# Patient Record
Sex: FEMALE | Race: WHITE | NOT HISPANIC OR LATINO | URBAN - METROPOLITAN AREA
[De-identification: names, ages, dates, MRNs, and addresses within clinical notes are randomized per-mention and may not be internally consistent; named-entity substitution may affect disease eponyms.]

---

## 2018-06-27 VITALS
RESPIRATION RATE: 16 BRPM | HEART RATE: 74 BPM | DIASTOLIC BLOOD PRESSURE: 68 MMHG | SYSTOLIC BLOOD PRESSURE: 102 MMHG | TEMPERATURE: 98 F | OXYGEN SATURATION: 96 % | WEIGHT: 100.09 LBS

## 2018-06-27 LAB
ALBUMIN SERPL ELPH-MCNC: 2.2 G/DL — LOW (ref 3.4–5)
ALBUMIN SERPL ELPH-MCNC: 2.8 G/DL — LOW (ref 3.4–5)
ALP SERPL-CCNC: 38 U/L — LOW (ref 40–120)
ALP SERPL-CCNC: 45 U/L — SIGNIFICANT CHANGE UP (ref 40–120)
ALT FLD-CCNC: 41 U/L — SIGNIFICANT CHANGE UP (ref 12–42)
ALT FLD-CCNC: 46 U/L — HIGH (ref 12–42)
ANION GAP SERPL CALC-SCNC: 5 MMOL/L — LOW (ref 9–16)
ANION GAP SERPL CALC-SCNC: 7 MMOL/L — LOW (ref 9–16)
APPEARANCE UR: CLEAR — SIGNIFICANT CHANGE UP
AST SERPL-CCNC: 102 U/L — HIGH (ref 15–37)
AST SERPL-CCNC: 104 U/L — HIGH (ref 15–37)
BASOPHILS NFR BLD AUTO: 0.2 % — SIGNIFICANT CHANGE UP (ref 0–2)
BILIRUB DIRECT SERPL-MCNC: 0.3 MG/DL — HIGH
BILIRUB SERPL-MCNC: 1 MG/DL — SIGNIFICANT CHANGE UP (ref 0.2–1.2)
BILIRUB SERPL-MCNC: 1.4 MG/DL — HIGH (ref 0.2–1.2)
BILIRUB UR-MCNC: NEGATIVE — SIGNIFICANT CHANGE UP
BUN SERPL-MCNC: 37 MG/DL — HIGH (ref 7–23)
BUN SERPL-MCNC: 46 MG/DL — HIGH (ref 7–23)
CALCIUM SERPL-MCNC: 7.8 MG/DL — LOW (ref 8.5–10.5)
CALCIUM SERPL-MCNC: 9.3 MG/DL — SIGNIFICANT CHANGE UP (ref 8.5–10.5)
CHLORIDE SERPL-SCNC: 101 MMOL/L — SIGNIFICANT CHANGE UP (ref 96–108)
CHLORIDE SERPL-SCNC: 95 MMOL/L — LOW (ref 96–108)
CK SERPL-CCNC: 1936 U/L — HIGH (ref 26–192)
CK SERPL-CCNC: 2427 U/L — HIGH (ref 26–192)
CO2 SERPL-SCNC: 33 MMOL/L — HIGH (ref 22–31)
CO2 SERPL-SCNC: 39 MMOL/L — HIGH (ref 22–31)
COLOR SPEC: YELLOW — SIGNIFICANT CHANGE UP
CREAT SERPL-MCNC: 1.26 MG/DL — SIGNIFICANT CHANGE UP (ref 0.5–1.3)
CREAT SERPL-MCNC: 1.27 MG/DL — SIGNIFICANT CHANGE UP (ref 0.5–1.3)
DIFF PNL FLD: ABNORMAL
EOSINOPHIL NFR BLD AUTO: 0 % — SIGNIFICANT CHANGE UP (ref 0–6)
ETHANOL SERPL-MCNC: <3 MG/DL — SIGNIFICANT CHANGE UP
GLUCOSE SERPL-MCNC: 142 MG/DL — HIGH (ref 70–99)
GLUCOSE SERPL-MCNC: 156 MG/DL — HIGH (ref 70–99)
GLUCOSE UR QL: NEGATIVE — SIGNIFICANT CHANGE UP
HCT VFR BLD CALC: 39.1 % — SIGNIFICANT CHANGE UP (ref 34.5–45)
HGB BLD-MCNC: 13.7 G/DL — SIGNIFICANT CHANGE UP (ref 11.5–15.5)
IMM GRANULOCYTES NFR BLD AUTO: 0.6 % — SIGNIFICANT CHANGE UP (ref 0–1.5)
KETONES UR-MCNC: NEGATIVE — SIGNIFICANT CHANGE UP
LEUKOCYTE ESTERASE UR-ACNC: NEGATIVE — SIGNIFICANT CHANGE UP
LYMPHOCYTES # BLD AUTO: 10.7 % — LOW (ref 13–44)
MAGNESIUM SERPL-MCNC: 2.4 MG/DL — SIGNIFICANT CHANGE UP (ref 1.6–2.6)
MCHC RBC-ENTMCNC: 31.9 PG — SIGNIFICANT CHANGE UP (ref 27–34)
MCHC RBC-ENTMCNC: 35 G/DL — SIGNIFICANT CHANGE UP (ref 32–36)
MCV RBC AUTO: 91.1 FL — SIGNIFICANT CHANGE UP (ref 80–100)
MONOCYTES NFR BLD AUTO: 7.7 % — SIGNIFICANT CHANGE UP (ref 2–14)
NEUTROPHILS NFR BLD AUTO: 80.8 % — HIGH (ref 43–77)
NITRITE UR-MCNC: NEGATIVE — SIGNIFICANT CHANGE UP
PCP SPEC-MCNC: SIGNIFICANT CHANGE UP
PH UR: 5.5 — SIGNIFICANT CHANGE UP (ref 5–8)
PLATELET # BLD AUTO: 192 K/UL — SIGNIFICANT CHANGE UP (ref 150–400)
POTASSIUM SERPL-MCNC: 2.9 MMOL/L — CRITICAL LOW (ref 3.5–5.3)
POTASSIUM SERPL-MCNC: 2.9 MMOL/L — CRITICAL LOW (ref 3.5–5.3)
POTASSIUM SERPL-SCNC: 2.9 MMOL/L — CRITICAL LOW (ref 3.5–5.3)
POTASSIUM SERPL-SCNC: 2.9 MMOL/L — CRITICAL LOW (ref 3.5–5.3)
PROT SERPL-MCNC: 6 G/DL — LOW (ref 6.4–8.2)
PROT SERPL-MCNC: 7.4 G/DL — SIGNIFICANT CHANGE UP (ref 6.4–8.2)
PROT UR-MCNC: NEGATIVE MG/DL — SIGNIFICANT CHANGE UP
RBC # BLD: 4.29 M/UL — SIGNIFICANT CHANGE UP (ref 3.8–5.2)
RBC # FLD: 13 % — SIGNIFICANT CHANGE UP (ref 10.3–16.9)
SODIUM SERPL-SCNC: 139 MMOL/L — SIGNIFICANT CHANGE UP (ref 132–145)
SODIUM SERPL-SCNC: 141 MMOL/L — SIGNIFICANT CHANGE UP (ref 132–145)
SP GR SPEC: 1.01 — SIGNIFICANT CHANGE UP (ref 1–1.03)
TROPONIN I SERPL-MCNC: 0.09 NG/ML — HIGH (ref 0.02–0.06)
TROPONIN I SERPL-MCNC: 0.09 NG/ML — HIGH (ref 0.02–0.06)
UROBILINOGEN FLD QL: 0.2 E.U./DL — SIGNIFICANT CHANGE UP
WBC # BLD: 12.6 K/UL — HIGH (ref 3.8–10.5)
WBC # FLD AUTO: 12.6 K/UL — HIGH (ref 3.8–10.5)

## 2018-06-27 PROCEDURE — 70450 CT HEAD/BRAIN W/O DYE: CPT | Mod: 26

## 2018-06-27 PROCEDURE — 72125 CT NECK SPINE W/O DYE: CPT | Mod: 26

## 2018-06-27 PROCEDURE — 71045 X-RAY EXAM CHEST 1 VIEW: CPT | Mod: 26

## 2018-06-27 PROCEDURE — 99285 EMERGENCY DEPT VISIT HI MDM: CPT | Mod: 25

## 2018-06-27 PROCEDURE — 76700 US EXAM ABDOM COMPLETE: CPT | Mod: 26

## 2018-06-27 PROCEDURE — 93010 ELECTROCARDIOGRAM REPORT: CPT

## 2018-06-27 RX ORDER — SODIUM CHLORIDE 9 MG/ML
1000 INJECTION INTRAMUSCULAR; INTRAVENOUS; SUBCUTANEOUS
Qty: 0 | Refills: 0 | Status: DISCONTINUED | OUTPATIENT
Start: 2018-06-27 | End: 2018-06-27

## 2018-06-27 RX ORDER — POTASSIUM CHLORIDE 20 MEQ
40 PACKET (EA) ORAL ONCE
Qty: 0 | Refills: 0 | Status: COMPLETED | OUTPATIENT
Start: 2018-06-27 | End: 2018-06-27

## 2018-06-27 RX ORDER — POTASSIUM CHLORIDE 20 MEQ
10 PACKET (EA) ORAL ONCE
Qty: 0 | Refills: 0 | Status: COMPLETED | OUTPATIENT
Start: 2018-06-27 | End: 2018-06-27

## 2018-06-27 RX ORDER — SODIUM CHLORIDE 9 MG/ML
1000 INJECTION INTRAMUSCULAR; INTRAVENOUS; SUBCUTANEOUS
Qty: 0 | Refills: 0 | Status: DISCONTINUED | OUTPATIENT
Start: 2018-06-27 | End: 2018-06-28

## 2018-06-27 RX ADMIN — Medication 100 MILLIEQUIVALENT(S): at 13:29

## 2018-06-27 RX ADMIN — SODIUM CHLORIDE 1000 MILLILITER(S): 9 INJECTION INTRAMUSCULAR; INTRAVENOUS; SUBCUTANEOUS at 19:50

## 2018-06-27 RX ADMIN — SODIUM CHLORIDE 150 MILLILITER(S): 9 INJECTION INTRAMUSCULAR; INTRAVENOUS; SUBCUTANEOUS at 13:09

## 2018-06-27 RX ADMIN — Medication 40 MILLIEQUIVALENT(S): at 13:09

## 2018-06-27 RX ADMIN — SODIUM CHLORIDE 100 MILLILITER(S): 9 INJECTION INTRAMUSCULAR; INTRAVENOUS; SUBCUTANEOUS at 20:42

## 2018-06-27 RX ADMIN — SODIUM CHLORIDE 150 MILLILITER(S): 9 INJECTION INTRAMUSCULAR; INTRAVENOUS; SUBCUTANEOUS at 13:29

## 2018-06-27 RX ADMIN — Medication 100 MILLIEQUIVALENT(S): at 20:41

## 2018-06-27 RX ADMIN — Medication 100 MILLIEQUIVALENT(S): at 22:21

## 2018-06-27 RX ADMIN — Medication 40 MILLIEQUIVALENT(S): at 20:41

## 2018-06-27 NOTE — ED PROVIDER NOTE - ATTENDING CONTRIBUTION TO CARE
Patient presenting with fall/found down/frequent fall. More clouded than usual per HHA. VSS. Scabs to forehead. No focal c/o. Will send gomez for falls with urine and CPK. Anticipate admit. Per HHA she is off baseline and dn seem safe for d/c.

## 2018-06-27 NOTE — ED PROVIDER NOTE - PMH
Anxiety    ASHD (arteriosclerotic heart disease)    Hypertension    Syncope and collapse    UTI (urinary tract infection)

## 2018-06-27 NOTE — ED ADULT NURSE NOTE - CHIEF COMPLAINT QUOTE
here for AMS, as per HHA pt was found on the floor and is " not herself" weakness noted to left side of body- last known normal unknown here for AMS, as per HHA (Tatum)  pt was found on the floor and is " not herself" weakness noted to left side of body- last known imelda unknown

## 2018-06-27 NOTE — ED ADULT TRIAGE NOTE - CHIEF COMPLAINT QUOTE
here for AMS, as per HHA pt was found on the floor and is " not herself" weakness noted to left side of body- last known normal unknown

## 2018-06-27 NOTE — ED PROVIDER NOTE - OBJECTIVE STATEMENT
89 y/o Female with a PMHx of ASHD, and HTN presents to the ED with hha after bein found on the couch, as per hha she usually answers the door to when the haa arrives. but has been able to talk to to the rafa since monday. 89 y/o Female with a PMHx of ASHD, and HTN presents to the ED with her HHA after being found on floor. As per HHA, she usually answers the door when the HHA arrives but she not has been able to walk to the door since Monday (2 days). The HHA was able to let her self in with a key and found the pt on the floor next to the couch. She was alert and oriented to person and place. Pt was conversant and denied any complaints. She initially was amnestic to falling but later states she recalls falling off the couch. Denies headache, fever, chills, visual changes, weakness, numbness, tingling, CP, SOB, palpitations, abdominal pain, N/V/D/C, pain to extremities, urinary incontinence, sick contacts, and recent travel. Follows with Dr. Akbar Hicks, Cardiology [phone]

## 2018-06-27 NOTE — ED PROVIDER NOTE - PROGRESS NOTE DETAILS
Pt discussed with Dr. Gandhi and YISEL via [facility] transfer center. Pt discussed with Dr. Gandhi and YISEL via [facility] transfer center.  He requests repeat cmp, trop and mag Repeat labs resulted.  Potassium unchanged.  Dr. Gandhi called via [facility] transfer center.  Pt cannot go to regional medicine location with potassium below 3.  Will repeat cmp and trop once 2nd and 3rd potassium infusions completed. K 3.4. discussed with transfer center. patient accepted under Reji CHANG

## 2018-06-27 NOTE — ED PROVIDER NOTE - RAPID ASSESSMENT
89 yo female BIBEMS + HHA for fall.  Found on the floor.  AAO x 2.  Progressive decline as per HHA.  And has been more sleepy.  No VS dernagements on arrival. Orders placed: labs, imaging, urine.

## 2018-06-28 ENCOUNTER — INPATIENT (INPATIENT)
Facility: HOSPITAL | Age: 83
LOS: 2 days | Discharge: NURSING, REHAB OR LONG-TERM CARE | DRG: 557 | End: 2018-07-01
Attending: INTERNAL MEDICINE | Admitting: INTERNAL MEDICINE
Payer: MEDICARE

## 2018-06-28 DIAGNOSIS — R63.8 OTHER SYMPTOMS AND SIGNS CONCERNING FOOD AND FLUID INTAKE: ICD-10-CM

## 2018-06-28 DIAGNOSIS — E46 UNSPECIFIED PROTEIN-CALORIE MALNUTRITION: ICD-10-CM
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
## 2018-06-28 DIAGNOSIS — R94.31 ABNORMAL ELECTROCARDIOGRAM [ECG] [EKG]: ICD-10-CM

## 2018-06-28 DIAGNOSIS — R26.81 UNSTEADINESS ON FEET: ICD-10-CM

## 2018-06-28 DIAGNOSIS — I10 ESSENTIAL (PRIMARY) HYPERTENSION: ICD-10-CM

## 2018-06-28 DIAGNOSIS — Z29.9 ENCOUNTER FOR PROPHYLACTIC MEASURES, UNSPECIFIED: ICD-10-CM

## 2018-06-28 DIAGNOSIS — R74.8 ABNORMAL LEVELS OF OTHER SERUM ENZYMES: ICD-10-CM

## 2018-06-28 DIAGNOSIS — R74.0 NONSPECIFIC ELEVATION OF LEVELS OF TRANSAMINASE AND LACTIC ACID DEHYDROGENASE [LDH]: ICD-10-CM

## 2018-06-28 DIAGNOSIS — Z96.643 PRESENCE OF ARTIFICIAL HIP JOINT, BILATERAL: Chronic | ICD-10-CM

## 2018-06-28 DIAGNOSIS — M62.82 RHABDOMYOLYSIS: ICD-10-CM

## 2018-06-28 DIAGNOSIS — E87.6 HYPOKALEMIA: ICD-10-CM

## 2018-06-28 DIAGNOSIS — E87.3 ALKALOSIS: ICD-10-CM

## 2018-06-28 LAB
ALBUMIN SERPL ELPH-MCNC: 2.3 G/DL — LOW (ref 3.4–5)
ALBUMIN SERPL ELPH-MCNC: 2.8 G/DL — LOW (ref 3.3–5)
ALP SERPL-CCNC: 32 U/L — LOW (ref 40–120)
ALP SERPL-CCNC: 40 U/L — SIGNIFICANT CHANGE UP (ref 40–120)
ALT FLD-CCNC: 33 U/L — SIGNIFICANT CHANGE UP (ref 10–45)
ALT FLD-CCNC: 45 U/L — HIGH (ref 12–42)
ANION GAP SERPL CALC-SCNC: 5 MMOL/L — LOW (ref 9–16)
ANION GAP SERPL CALC-SCNC: 7 MMOL/L — SIGNIFICANT CHANGE UP (ref 5–17)
AST SERPL-CCNC: 110 U/L — HIGH (ref 15–37)
AST SERPL-CCNC: 66 U/L — HIGH (ref 10–40)
BILIRUB SERPL-MCNC: 1 MG/DL — SIGNIFICANT CHANGE UP (ref 0.2–1.2)
BILIRUB SERPL-MCNC: 1.2 MG/DL — SIGNIFICANT CHANGE UP (ref 0.2–1.2)
BUN SERPL-MCNC: 27 MG/DL — HIGH (ref 7–23)
BUN SERPL-MCNC: 33 MG/DL — HIGH (ref 7–23)
CALCIUM SERPL-MCNC: 8.4 MG/DL — SIGNIFICANT CHANGE UP (ref 8.4–10.5)
CALCIUM SERPL-MCNC: 8.6 MG/DL — SIGNIFICANT CHANGE UP (ref 8.5–10.5)
CHLORIDE SERPL-SCNC: 100 MMOL/L — SIGNIFICANT CHANGE UP (ref 96–108)
CHLORIDE SERPL-SCNC: 105 MMOL/L — SIGNIFICANT CHANGE UP (ref 96–108)
CO2 SERPL-SCNC: 31 MMOL/L — SIGNIFICANT CHANGE UP (ref 22–31)
CO2 SERPL-SCNC: 33 MMOL/L — HIGH (ref 22–31)
CREAT SERPL-MCNC: 0.95 MG/DL — SIGNIFICANT CHANGE UP (ref 0.5–1.3)
CREAT SERPL-MCNC: 1.11 MG/DL — SIGNIFICANT CHANGE UP (ref 0.5–1.3)
CULTURE RESULTS: SIGNIFICANT CHANGE UP
GLUCOSE SERPL-MCNC: 101 MG/DL — HIGH (ref 70–99)
GLUCOSE SERPL-MCNC: 98 MG/DL — SIGNIFICANT CHANGE UP (ref 70–99)
HCT VFR BLD CALC: 36.3 % — SIGNIFICANT CHANGE UP (ref 34.5–45)
HGB BLD-MCNC: 11.9 G/DL — SIGNIFICANT CHANGE UP (ref 11.5–15.5)
MCHC RBC-ENTMCNC: 30.5 PG — SIGNIFICANT CHANGE UP (ref 27–34)
MCHC RBC-ENTMCNC: 32.8 G/DL — SIGNIFICANT CHANGE UP (ref 32–36)
MCV RBC AUTO: 93.1 FL — SIGNIFICANT CHANGE UP (ref 80–100)
PLATELET # BLD AUTO: 176 K/UL — SIGNIFICANT CHANGE UP (ref 150–400)
POTASSIUM SERPL-MCNC: 3.4 MMOL/L — LOW (ref 3.5–5.3)
POTASSIUM SERPL-MCNC: 4.4 MMOL/L — SIGNIFICANT CHANGE UP (ref 3.5–5.3)
POTASSIUM SERPL-SCNC: 3.4 MMOL/L — LOW (ref 3.5–5.3)
POTASSIUM SERPL-SCNC: 4.4 MMOL/L — SIGNIFICANT CHANGE UP (ref 3.5–5.3)
PROT SERPL-MCNC: 5.8 G/DL — LOW (ref 6–8.3)
PROT SERPL-MCNC: 6.3 G/DL — LOW (ref 6.4–8.2)
RBC # BLD: 3.9 M/UL — SIGNIFICANT CHANGE UP (ref 3.8–5.2)
RBC # FLD: 13.9 % — SIGNIFICANT CHANGE UP (ref 10.3–16.9)
SODIUM SERPL-SCNC: 138 MMOL/L — SIGNIFICANT CHANGE UP (ref 132–145)
SODIUM SERPL-SCNC: 143 MMOL/L — SIGNIFICANT CHANGE UP (ref 135–145)
SPECIMEN SOURCE: SIGNIFICANT CHANGE UP
TROPONIN I SERPL-MCNC: 0.09 NG/ML — HIGH (ref 0.02–0.06)
TROPONIN T SERPL-MCNC: 0.02 NG/ML — HIGH (ref 0–0.01)
WBC # BLD: 10 K/UL — SIGNIFICANT CHANGE UP (ref 3.8–10.5)
WBC # FLD AUTO: 10 K/UL — SIGNIFICANT CHANGE UP (ref 3.8–10.5)

## 2018-06-28 PROCEDURE — 99223 1ST HOSP IP/OBS HIGH 75: CPT | Mod: GC

## 2018-06-28 PROCEDURE — 12345: CPT | Mod: NC,GC

## 2018-06-28 RX ORDER — POTASSIUM CHLORIDE 20 MEQ
40 PACKET (EA) ORAL EVERY 4 HOURS
Qty: 0 | Refills: 0 | Status: DISCONTINUED | OUTPATIENT
Start: 2018-06-28 | End: 2018-06-28

## 2018-06-28 RX ORDER — FUROSEMIDE 40 MG
20 TABLET ORAL DAILY
Qty: 0 | Refills: 0 | Status: DISCONTINUED | OUTPATIENT
Start: 2018-06-28 | End: 2018-06-28

## 2018-06-28 RX ORDER — KETOCONAZOLE 20 MG/G
1 AEROSOL, FOAM TOPICAL
Qty: 0 | Refills: 0 | Status: DISCONTINUED | OUTPATIENT
Start: 2018-06-28 | End: 2018-07-01

## 2018-06-28 RX ORDER — HEPARIN SODIUM 5000 [USP'U]/ML
5000 INJECTION INTRAVENOUS; SUBCUTANEOUS EVERY 12 HOURS
Qty: 0 | Refills: 0 | Status: DISCONTINUED | OUTPATIENT
Start: 2018-06-28 | End: 2018-07-01

## 2018-06-28 RX ORDER — RALOXIFENE HYDROCHLORIDE 60 MG/1
60 TABLET, COATED ORAL DAILY
Qty: 0 | Refills: 0 | Status: DISCONTINUED | OUTPATIENT
Start: 2018-06-28 | End: 2018-07-01

## 2018-06-28 RX ADMIN — Medication 40 MILLIEQUIVALENT(S): at 10:24

## 2018-06-28 RX ADMIN — HEPARIN SODIUM 5000 UNIT(S): 5000 INJECTION INTRAVENOUS; SUBCUTANEOUS at 17:34

## 2018-06-28 RX ADMIN — Medication 40 MILLIEQUIVALENT(S): at 06:51

## 2018-06-28 RX ADMIN — KETOCONAZOLE 1 APPLICATION(S): 20 AEROSOL, FOAM TOPICAL at 23:56

## 2018-06-28 RX ADMIN — HEPARIN SODIUM 5000 UNIT(S): 5000 INJECTION INTRAVENOUS; SUBCUTANEOUS at 06:51

## 2018-06-28 RX ADMIN — KETOCONAZOLE 1 APPLICATION(S): 20 AEROSOL, FOAM TOPICAL at 10:24

## 2018-06-28 RX ADMIN — RALOXIFENE HYDROCHLORIDE 60 MILLIGRAM(S): 60 TABLET, COATED ORAL at 18:37

## 2018-06-28 RX ADMIN — SODIUM CHLORIDE 100 MILLILITER(S): 9 INJECTION INTRAMUSCULAR; INTRAVENOUS; SUBCUTANEOUS at 03:28

## 2018-06-28 RX ADMIN — Medication 20 MILLIGRAM(S): at 06:51

## 2018-06-28 NOTE — ED ADULT NURSE REASSESSMENT NOTE - NS ED NURSE REASSESS COMMENT FT1
patient turned on right side for comfort; unable to find mepliex dressing for pressure ulcer to apply fo rprotection; will turn from side to side to relieve pressure as per patient needs and comfort

## 2018-06-28 NOTE — DIETITIAN INITIAL EVALUATION ADULT. - PROBLEM SELECTOR PLAN 8
She appears dry and has difficulty making food for herself.  Her albumin is 2.2.  Replete electrolytes and feed regular diet.

## 2018-06-28 NOTE — H&P ADULT - PROBLEM SELECTOR PLAN 7
Uptrending troponin s(CK peaked) with EKG showing NSR with prolonged QTc 508, no active chest pain or history of chest pain today.  Trend to peak and repeat EKG in am. Uptrending troponin s(CK peaked) with EKG showing NSR with prolonged QTc 508, no active chest pain or history of chest pain today.  Trend to peak and repeat EKG in am.    #Tinea pedis  -Ketoconazole BID to toes    #Venous stasis   -Consider vascular consult vs. wound care as it needs to be better dressed and cleaned. DOes not appear infected. She appears dry and has difficulty making food for herself.  Her albumin is 2.2.  Replete electrolytes and feed regular diet. Hx of HTN but not hypertensive here.  Check orthostatics  Hold antihypertensives; clarify home medications Hx of HTN but not hypertensive here.  Check orthostatics  Hold antihypertensives; clarify home medications; clarify why pt is on lasix     HPL: clarify statin dose, hold for now in setting of rhabdo

## 2018-06-28 NOTE — PHYSICAL THERAPY INITIAL EVALUATION ADULT - PLANNED THERAPY INTERVENTIONS, PT EVAL
balance training/postural re-education/strengthening/transfer training/gait training/bed mobility training

## 2018-06-28 NOTE — PHYSICAL THERAPY INITIAL EVALUATION ADULT - GAIT DEVIATIONS NOTED, PT EVAL
decreased step length/decreased weight-shifting ability/decreased nerissa/increased time in double stance

## 2018-06-28 NOTE — H&P ADULT - NSHPLABSRESULTS_GEN_ALL_CORE
138  |  100  |  33<H>  ----------------------------<  98  3.4<L>   |  33<H>  |  1.11    Ca    8.6      2018 00:12  Mg     2.4         TPro  6.3<L>  /  Alb  2.3<L>  /  TBili  1.2  /  DBili  x   /  AST  110<H>  /  ALT  45<H>  /  AlkPhos  40                              13.7   12.6  )-----------( 192      ( 2018 11:30 )             39.1       CARDIAC MARKERS ( 2018 00:12 )  0.091 ng/mL / x     / x     / x     / x      CARDIAC MARKERS ( 2018 19:30 )  0.090 ng/mL / x     / 1936 U/L / x     / x      CARDIAC MARKERS ( 2018 13:06 )  0.086 ng/mL / x     / 2427 U/L / x     / x            LIVER FUNCTIONS - ( 2018 00:12 )  Alb: 2.3 g/dL / Pro: 6.3 g/dL / ALK PHOS: 40 U/L / ALT: 45 U/L / AST: 110 U/L / GGT: x                 Urinalysis Basic - ( 2018 12:48 )    Color: Yellow / Appearance: Clear / S.015 / pH: x  Gluc: x / Ketone: NEGATIVE  / Bili: NEGATIVE / Urobili: 0.2 E.U./dL   Blood: x / Protein: NEGATIVE mg/dL / Nitrite: NEGATIVE   Leuk Esterase: NEGATIVE / RBC: < 5 /HPF / WBC x   Sq Epi: x / Non Sq Epi: Few /HPF / Bacteria: x      EKG: NSR w/ LAD, prolonged QTc 511    US Abd: IMPRESSION:  Limited study.    Right renal cyst. Small/atrophic kidneys bilaterally.    Sludge in the gallbladder.      CT head   IMPRESSION: No intracranial hemorrhage or acute transcortical infarct.   Generalized volume loss with small vessel ischemic disease.     CT cervical spine: Impression:   Limited evaluation.     Minimal anterolisthesis of C3 on C4, C4 on C5 and C7 on T1 . Mild   retrolisthesis of C5 on C6 .     Marked degenerative changes of the cervical spine with no evidence of   acute fractures.

## 2018-06-28 NOTE — DIETITIAN INITIAL EVALUATION ADULT. - ENERGY NEEDS
Admission weight used as pt is within % ideal body weight  Needs calculated for maintenance in older adults

## 2018-06-28 NOTE — H&P ADULT - NSHPPHYSICALEXAM_GEN_ALL_CORE
ICU Vital Signs Last 24 Hrs  T(C): 37 (28 Jun 2018 03:22), Max: 37 (27 Jun 2018 13:22)  T(F): 98.6 (28 Jun 2018 03:22), Max: 98.6 (27 Jun 2018 13:22)  HR: 78 (28 Jun 2018 03:22) (67 - 78)  BP: 103/58 (28 Jun 2018 03:22) (102/68 - 138/74)  BP(mean): --  ABP: --  ABP(mean): --  RR: 18 (28 Jun 2018 03:22) (16 - 18)  SpO2: 94% (28 Jun 2018 03:22) (94% - 99%)    General:  HEENT:  Cardio:  Lung:  Abd:  Ext: ICU Vital Signs Last 24 Hrs  T(C): 37 (28 Jun 2018 03:22), Max: 37 (27 Jun 2018 13:22)  T(F): 98.6 (28 Jun 2018 03:22), Max: 98.6 (27 Jun 2018 13:22)  HR: 78 (28 Jun 2018 03:22) (67 - 78)  BP: 103/58 (28 Jun 2018 03:22) (102/68 - 138/74)  BP(mean): --  ABP: --  ABP(mean): --  RR: 18 (28 Jun 2018 03:22) (16 - 18)  SpO2: 94% (28 Jun 2018 03:22) (94% - 99%)    General: NAD, very small, frail woman with many bruises in various stages of healing  HEENT: Dry mm, no jvd, no lymphadenopathy  Cardio: RRR no 2/6 systolic murmur heard  Lung: clear lungs  Abd: Soft ntnd normal bowel sounds  Ext: WWP.  B/L venous stasis changes with compression stockings overlying them. Poor hygene on the legs and fungal infection of the toes

## 2018-06-28 NOTE — DIETITIAN INITIAL EVALUATION ADULT. - PROBLEM SELECTOR PLAN 7
Hx of HTN but not hypertensive here.  Check orthostatics  Hold antihypertensives; clarify home medications; clarify why pt is on lasix     HPL: clarify statin dose, hold for now in setting of rhabdo

## 2018-06-28 NOTE — DIETITIAN INITIAL EVALUATION ADULT. - PROBLEM SELECTOR PLAN 2
RUQ us showed no acute cholestatic picture, however was not done with doppler.  Would trend CMP and send hepatitis panel to start.

## 2018-06-28 NOTE — PROGRESS NOTE ADULT - PROBLEM SELECTOR PLAN 4
see above    #diastolic HF  continue home meds lasix 40 and metolazone 2.5 see above    #diastolic HF  continue home meds lasix 40. Hold metaolazone 2.5 for now as pt appears dry on exam.

## 2018-06-28 NOTE — PROGRESS NOTE ADULT - PROBLEM SELECTOR PLAN 9
Uptrending troponin (CK peaked) with EKG showing NSR with prolonged QTc 508, no active chest pain or history of chest pain today.  Trend to peak and repeat EKG     #Tinea pedis  -Ketoconazole BID to toes    #Venous stasis   -Consider vascular consult vs. wound care as it needs to be better dressed and cleaned. DOes not appear infected.

## 2018-06-28 NOTE — PHYSICAL THERAPY INITIAL EVALUATION ADULT - CRITERIA FOR SKILLED THERAPEUTIC INTERVENTIONS
rehab potential/anticipated discharge recommendation/impairments found/functional limitations in following categories/risk reduction/prevention/therapy frequency

## 2018-06-28 NOTE — H&P ADULT - HISTORY OF PRESENT ILLNESS
89F pmhx of HTN admitted after being found down at her apartment after apparent fall.  She says that she was sitting on her couch and went to reach for a magazine on the floor and tipped over.  She thinks she was on the floor for less than 20 minutes but isn't sure.  She denies hitting her head (has scab on her head, she says they have been there for a while).  She has multiple bruises over her body and she says its because her apartment is small and she runs into things a lot.  She denies any LOC, chest pain, palpitations, dizziness or predromal symptoms.  She has no other complaints of cough, congestion, dysuria, frequency, abdominal pain, chest pain, nausea or vomiting.   She lives alone in elevator building and has HHA 3 days per week MWF for 3 hours at a time. She also has a VNS nurse who comes to care for her chronic venous stasis on her b/l LE.     IN ED:  T 97.6 HR 69 /57 RR 16 O2 97%

## 2018-06-28 NOTE — H&P ADULT - PROBLEM SELECTOR PLAN 6
She appears dry and has difficulty making food for herself.  Her albumin is 2.2.  Replete electrolytes and feed regular diet. Hx of HTN but not hypertensive here.  Check orthostatics  Hold antihypertensives Suspect very dehydrated in accordance with protein calorie malnutrition. Hydrated in [facility]

## 2018-06-28 NOTE — H&P ADULT - PROBLEM SELECTOR PLAN 3
RUQ us showed no acute cholestatic picture, however was not done with doppler.  Would trend CMP and send hepatitis panel to start. PT eval for mechanical fall. No signs of infection or syncope.

## 2018-06-28 NOTE — H&P ADULT - PROBLEM SELECTOR PLAN 10
HSQ avoid QTc prolonging medications; repeat EKG , monitor lytes    Regular diet, keep mg > 2 K >4    PPX: HSQ

## 2018-06-28 NOTE — DIETITIAN INITIAL EVALUATION ADULT. - NUTRITION INTERVENTION
Meals and Snack/Vitamin/Nutrition - Related Medication Management/Medical Food Supplements/Feeding Assistance

## 2018-06-28 NOTE — CONSULT NOTE ADULT - ASSESSMENT
89 pmhx of HTN, HLD, CKD, diastolic HF admitted after an unwitnessed fall at home.    Problem/Plan - 1:  ·  Problem: Non-traumatic rhabdomyolysis.  Plan: in setting of fall, monitor CK.     Problem/Plan - 2:  ·  Problem: Transaminitis.  Plan: RUQ us showed no acute cholestatic picture, however was not done with doppler.  Trend CMP  LFTs downtrending today.

## 2018-06-28 NOTE — PROGRESS NOTE ADULT - PROBLEM SELECTOR PLAN 2
RUQ us showed no acute cholestatic picture, however was not done with doppler.  Trend CMP  LFTs downtrending today

## 2018-06-28 NOTE — H&P ADULT - PROBLEM SELECTOR PLAN 9
HSQ Regular diet, keep mg > 2 K >4 Uptrending troponin (CK peaked) with EKG showing NSR with prolonged QTc 508, no active chest pain or history of chest pain today.  Trend to peak and repeat EKG in am.    #Tinea pedis  -Ketoconazole BID to toes    #Venous stasis   -Consider vascular consult vs. wound care as it needs to be better dressed and cleaned. DOes not appear infected.

## 2018-06-28 NOTE — DIETITIAN INITIAL EVALUATION ADULT. - OTHER INFO
Pt admitted s/p fall.  Pt lives alone and HHA x3/week.  Pt prepares her own food.  Pt endorses UBW ~102 lbs; currently 44.1kg - pt is aware it is desirable for her to maintain her weight.  Pt remains NPO at this time and endorses feeling hungry.  Spoke with MD and diet is to be advanced to Regular.  Spoke with  host who will visit pt to obtain food preferences.  Pt denies GI distress or pain.  NKFA.  Skin: stage II coccyx pressure ulcer, b/l LE chronic venous stasis ulcers.

## 2018-06-28 NOTE — DIETITIAN INITIAL EVALUATION ADULT. - PROBLEM SELECTOR PLAN 6
Suspect very dehydrated in accordance with protein calorie malnutrition. Hydrated in [facility]

## 2018-06-28 NOTE — CONSULT NOTE ADULT - SUBJECTIVE AND OBJECTIVE BOX
Patient is a 89y old  Female who presents with a chief complaint of Fall (2018 04:24)       HPI:  89F pmhx of HTN admitted after being found down at her apartment after apparent fall.  She says that she was sitting on her couch and went to reach for a magazine on the floor and tipped over.  She thinks she was on the floor for less than 20 minutes but isn't sure.  She denies hitting her head (has scab on her head, she says they have been there for a while).  She has multiple bruises over her body and she says its because her apartment is small and she runs into things a lot.  She denies any LOC, chest pain, palpitations, dizziness or predromal symptoms.  She has no other complaints of cough, congestion, dysuria, frequency, abdominal pain, chest pain, nausea or vomiting.   She lives alone in elevator building and has [facility]HA 3 days per week MWF for 3 hours at a time. She also has a VNS nurse who comes to care for her chronic venous stasis on her b/l LE.     IN ED:  T 97.6 HR 69 /57 RR 16 O2 97% (2018 04:24)      PAST MEDICAL & SURGICAL HISTORY:  UTI (urinary tract infection)  Syncope and collapse  Hypertension  ASHD (arteriosclerotic heart disease)  Anxiety  H/O bilateral hip replacements      MEDICATIONS  (STANDING):  heparin  Injectable 5000 Unit(s) SubCutaneous every 12 hours  ketoconazole 2% Cream 1 Application(s) Topical two times a day  raloxifene 60 milliGRAM(s) Oral daily    MEDICATIONS  (PRN):      Social History: lives alone in an elevator accessible apartment building has home attendant Mon/Wed/ Fri x 3-4 hrs    Functional Level Prior to Admission: states she is ADL independent, walks with a walker    FAMILY HISTORY:  No pertinent family history in first degree relatives      CBC Full  -  ( 2018 11:00 )  WBC Count : 10.0 K/uL  Hemoglobin : 11.9 g/dL  Hematocrit : 36.3 %  Platelet Count - Automated : 176 K/uL  Mean Cell Volume : 93.1 fL  Mean Cell Hemoglobin : 30.5 pg  Mean Cell Hemoglobin Concentration : 32.8 g/dL  Auto Neutrophil # : x  Auto Lymphocyte # : x  Auto Monocyte # : x  Auto Eosinophil # : x  Auto Basophil # : x  Auto Neutrophil % : x  Auto Lymphocyte % : x  Auto Monocyte % : x  Auto Eosinophil % : x  Auto Basophil % : x          143  |  105  |  27<H>  ----------------------------<  101<H>  4.4   |  31  |  0.95    Ca    8.4      2018 11:00  Mg     2.4         TPro  5.8<L>  /  Alb  2.8<L>  /  TBili  1.0  /  DBili  x   /  AST  66<H>  /  ALT  33  /  AlkPhos  32<L>        Urinalysis Basic - ( 2018 12:48 )    Color: Yellow / Appearance: Clear / S.015 / pH: x  Gluc: x / Ketone: NEGATIVE  / Bili: NEGATIVE / Urobili: 0.2 E.U./dL   Blood: x / Protein: NEGATIVE mg/dL / Nitrite: NEGATIVE   Leuk Esterase: NEGATIVE / RBC: < 5 /HPF / WBC x   Sq Epi: x / Non Sq Epi: Few /HPF / Bacteria: x          Radiology:    < from: Xray Chest 1 View AP/PA (18 @ 12:19) >  EXAM:  XR CHEST AP OR PA 1V                           PROCEDURE DATE:  2018          INTERPRETATION:  CLINICAL INFORMATION:  ams.    TECHNIQUE: A frontal view of the chest is dated 2018 12:19 PM.    COMPARISON: None available    FINDINGS: The lungs are clear.  There is no focal consolidation,   pneumothorax, or pleural effusion.  Although evaluation is limited on AP   view, the cardiac silhouette appears normal in size.      IMPRESSION: No focal infiltrates.        < from: CT Head No Cont (18 @ 12:56) >  EXAM:  CT BRAIN                           PROCEDURE DATE:  2018          INTERPRETATION:  PROCEDURE: CT brain without contrast.    INDICATION: Altered mental status    TECHNIQUE: Multiple axial sections were obtained at 5 mm intervals. The   images were reviewed in brain and bone windows.    COMPARISON: None    FINDINGS: The CT examination demonstrates generalized volume loss as   manifested by the enlargement of the ventricles, cisternal spaces, and   cortical sulci throughout. There is no midline shift or extra axial   collections. The gray white differentiation appears within normal limits.   There is no intracranial hemorrhage or acute transcortical infarct. There   is extensive patchy areas of hypodensity within the periventricular white   matter which may represent the sequela of small vessel ischemic disease.   The bony windows demonstrates no fractures. There is right scleral   calcification. The visualized paranasal sinuses are within normal limits.   The mastoid air cells are well aerated.    IMPRESSION: No intracranial hemorrhage or acute transcortical infarct.   Generalized volume loss with small vessel ischemic disease.         < from: CT Cervical Spine No Cont (18 @ 12:56) >  EXAM:  CT CERVICAL SPINE                           PROCEDURE DATE:  2018          INTERPRETATION:  Status post fall.      CT of the cervical spine was performed utilizing helically obtained axial   images from the occiput through T1. Images were reformatted into coronal   and sagittal orientation.    Findings: This study is limited due to motion artifact. There is minimal   anterolisthesis of C3 on C4, C4 on C5 and C7 on T1 (approximately 2 mm).    There is mild retrolisthesis of C5 on C6(approximately 2 to 3 mm).   Otherwise there is straightening of the cervical spine. The vertebral   bodies are of normal height and configuration. There is marked narrowing   of the disc spaces from C3 through C7 with anterior and posterior   osteophytes consistent with degenerative changes. There is vacuum   phenomenon at C3/C4. There is pannus surrounding the dens and effacing   the anterior epidural space  There is no evidence of prevertebral soft   tissue swelling.    There is limited evaluation of the spinal canal and its contents due to   artifact.    Evaluation of the individual levels demonstrates at the C3/C4 level there   is a disc osteophyte complex flattening the ventral thecal sac. There is   bilateral uncovertebral and facet hypertrophy. There is moderate   bilateral foraminal narrowing.    At the C4/C5 level there is a disc osteophyte complex mildly flattening   the ventral thecal sac. There is mild uncovertebral and facet   hypertrophy. There is mild bilateral foraminal narrowing.    At the C5/C6 level there is a disc osteophyte complex flattening the   ventral spinal cord. There is bilateral uncovertebral and facet   hypertrophy. There is moderate to severe bilateral foraminal narrowing,   right greater than left. There is minimal to mild spinal cord compression.    At the C6/C7 level there is a disc osteophyte complex mildly flattening   the ventral thecal sac. There is right uncovertebral and facet   hypertrophy. There is moderate to severe right foraminal narrowing.    At the C7/T1 level there is no evidence of a focal disc herniation or   spinal cord compression. There is no evidence of foraminal narrowing.   There is no evidence of acute fractures.    Impression:   Limited evaluation.     Minimal anterolisthesis of C3 on C4, C4 on C5 and C7 on T1 . Mild   retrolisthesis of C5 on C6 .     Marked degenerative changes of the cervical spine with no evidence of   acute fractures.                Vital Signs Last 24 Hrs  T(C): 37.2 (2018 15:54), Max: 37.2 (2018 10:25)  T(F): 98.9 (2018 15:54), Max: 98.9 (2018 10:25)  HR: 69 (2018 15:54) (67 - 78)  BP: 107/67 (2018 15:54) (103/55 - 119/55)  BP(mean): --  RR: 18 (2018 15:54) (16 - 18)  SpO2: 96% (2018 15:54) (94% - 97%)    REVIEW OF SYSTEMS:    CONSTITUTIONAL:  fatigue  EYES: No eye pain, visual disturbances, or discharge  ENMT:  No difficulty hearing, tinnitus, vertigo; No sinus or throat pain  NECK: No pain or stiffness  BREASTS: No pain, masses, or nipple discharge  RESPIRATORY: No cough, wheezing, chills or hemoptysis; No shortness of breath  CARDIOVASCULAR: No chest pain, palpitations, dizziness, or leg swelling  GASTROINTESTINAL: No abdominal or epigastric pain. No nausea, vomiting, or hematemesis; No diarrhea or constipation. No melena or hematochezia.  GENITOURINARY: No dysuria, frequency, hematuria, or incontinence  NEUROLOGICAL: No headaches, memory loss, loss of strength, numbness, or tremors  SKIN: No itching, burning, rashes, or lesions   LYMPH NODES: No enlarged glands  ENDOCRINE: No heat or cold intolerance; No hair loss  MUSCULOSKELETAL: No joint pain or swelling; No muscle, back, or extremity pain  PSYCHIATRIC: No depression, anxiety, mood swings, or difficulty sleeping  HEME/LYMPH: No easy bruising, or bleeding gums  ALLERGY AND IMMUNOLOGIC: No hives or eczema  VASCULAR: no swelling, erythema    Physical Exam: elderly frail  woman lying in semi Livingston's position, c/o feeling tired    Head: normocephalic, atraumatic    Eyes: PERRLA, EOMI, no nystagmus, sclera anicteric    ENT: nasal discharge, uvula midline, no oropharyngeal erythema/exudate    Neck: supple, negative JVD, negative carotid bruits, no thyromegaly    Chest: CTA bilaterally, neg wheeze, rhonchi, rales, crackles, egophany    Cardiovascular: regular rate and rhythm, neg murmurs/rubs/gallops    Abdomen: soft, non distended, non tender, negative rebound/guarding, normal bowel sounds, neg hepatosplenomegaly    Extremities: venous stasis changes with compression stockings negative calf tenderness to palpation, negative Ariana's sign, diffuse bruises UE/LE varying stages    :     Neurologic Exam:    Alert and oriented to person, place, date/year, speech fluent w/o dysarthria, repetition intact, comprehension intact,     Cranial Nerves:     II:                       pupils equal, round and reactive to light, visual fields intact   III/ IV/VI:             extraocular movements intact, neg nystagmus, ptosis  V:                       facial sensation intact, V1-3 normal  VII:                     face symmetric, no droop, normal eye closure and smile  VIII:                    hearing intact to finger rub bilaterally  IX/ X:                 soft palate rise symmetrical  XI:                      head turning, shoulder shrug normal  XII:                     tongue midline    Motor Exam:    Upper Extremities:        Right:    4/5                                                 negative pronator drift                                        Left:      4/5                                                 negative pronator drift      Lower Extremities:         Right     4-/5                                        Left:      4-/5    Sensory:              intact to LT/PP in all UE/LE dermatomes    DTR:                   = biceps/     triceps/     brachioradialis                            = patella/   medial hamstring/    ankle                            neg clonus                            neg Babinski                            neg Hoffmans    Finger to Nose:  wnl    Heel to Shin:       wnl    Rapid Alternating movements:   wnl    Joint Position Sense:  intact    Romberg:  not tested    Tandem Walking:  not tested    Gait:  not tested        PM&R Impression:    1) deconditioned  2) no focal weakness  3) s/p mechanical fall      Recommendations:    1) Physical therapy focusing on therapeutic exercises, bed mobility/transfer out of bed evaluation, progressive ambulation with assistive devices.    2) Anticipated Disposition Plan/Recommendations: pending functional progress

## 2018-06-28 NOTE — H&P ADULT - PROBLEM SELECTOR PLAN 4
Suspect very dehydrated in accordance with protein calorie malnutrition. Hydrated in [facility] RUQ us showed no acute cholestatic picture, however was not done with doppler.  Would trend CMP and send hepatitis panel to start. see above

## 2018-06-28 NOTE — H&P ADULT - PROBLEM SELECTOR PLAN 5
Hx of HTN but not hypertensive here.  Check orthostatics  Hold antihypertensives Suspect very dehydrated in accordance with protein calorie malnutrition. Hydrated in [facility] Replete K to goal of 4, pt prescribed K last admission so likely runs low frequently.

## 2018-06-28 NOTE — PROGRESS NOTE ADULT - PROBLEM SELECTOR PLAN 7
Hx of HTN but not hypertensive here.  Hold antihypertensives for now    #HLD  resume home med pravastatin 40 mg Hx of HTN but not hypertensive here.  Hold antihypertensives for now    #HLD  hold home med pravastatin 40 mg for now given transaminitis and rhabdo.

## 2018-06-28 NOTE — DIETITIAN INITIAL EVALUATION ADULT. - PROBLEM SELECTOR PLAN 10
avoid QTc prolonging medications; repeat EKG , monitor lytes    Regular diet, keep mg > 2 K >4    PPX: HSQ

## 2018-06-28 NOTE — PHYSICAL THERAPY INITIAL EVALUATION ADULT - GENERAL OBSERVATIONS, REHAB EVAL
No pertinent family history in first degree relatives Patient received in semi-cain position, no apparent distress, +hep lock.

## 2018-06-28 NOTE — H&P ADULT - PROBLEM SELECTOR PLAN 1
PT eval for mechanical fall. No signs of infection or syncope. in setting of fall, on IVFs, monitor CK

## 2018-06-28 NOTE — H&P ADULT - PROBLEM SELECTOR PROBLEM 9
Need for prophylactic measure Nutrition, metabolism, and development symptoms Troponin level elevated

## 2018-06-28 NOTE — H&P ADULT - PROBLEM SELECTOR PLAN 8
Regular diet, keep mg > 2 K >4 Uptrending troponin s(CK peaked) with EKG showing NSR with prolonged QTc 508, no active chest pain or history of chest pain today.  Trend to peak and repeat EKG in am.    #Tinea pedis  -Ketoconazole BID to toes    #Venous stasis   -Consider vascular consult vs. wound care as it needs to be better dressed and cleaned. DOes not appear infected. She appears dry and has difficulty making food for herself.  Her albumin is 2.2.  Replete electrolytes and feed regular diet.

## 2018-06-28 NOTE — H&P ADULT - ATTENDING COMMENTS
patient seen and examined    reviewed vs, labs, available radiological reports/ studies, ekg     agree w/ PE findings as above w/ additions/ exceptions/ pertinent findings:     1. fall  2. hypokalemia  3. rhabdo / transaminitis       rest of plan as above patient seen and examined    reviewed vs, labs, available radiological reports/ studies, ekg     agree w/ PE findings as above w/ additions/ exceptions/ pertinent findings: thin elderly, frail female, hard of hearing, bruising noted at head and arms , dry MM, no JVD, s1s2, lungs CTA b/l ; nt/nd     1. rhabdo / transaminitis in setting of fall from gait instability: on IVFs overnight, monitor CK, CMP ; PT evaluation  2. monitor lytes, replete as needed; trend troponins, repeat EKG   3. calrify pt's medications w/ pharmacy, PCP       rest of plan as above patient seen and examined    reviewed vs, labs, available radiological reports/ studies, ekg     agree w/ PE findings as above w/ additions/ exceptions/ pertinent findings: thin elderly, frail female, hard of hearing, bruising noted at head and arms , dry MM, no JVD, s1s2, lungs CTA b/l ; nt/nd     1. rhabdo / transaminitis in setting of fall from gait instability: on IVFs overnight, monitor CK, CMP ; PT evaluation; hold statin for now   2. monitor lytes, replete as needed; trend troponins, repeat EKG   3. calrify pt's medications w/ pharmacy; clarify pt's medical issues w/ pcp      rest of plan as above

## 2018-06-28 NOTE — PROGRESS NOTE ADULT - SUBJECTIVE AND OBJECTIVE BOX
Hospital Course:   89F pmhx of HTN, HLD, CKD, diastolic HF, brought in by [facility] after being found down at her apartment after apparent fall.  She says that she was sitting on her couch and went to reach for a magazine on the floor and tipped over.  She thinks she was on the floor for less than 20 minutes but isn't sure.  She denies hitting her head (has scab on her head, she says they have been there for a while).  She has multiple bruises over her body and she says its because her apartment is small and she runs into things a lot.  She denies any LOC, chest pain, palpitations, dizziness or predromal symptoms.  She has no other complaints of cough, congestion, dysuria, frequency, abdominal pain, chest pain, nausea or vomiting.   She lives alone in elevator building and has HHA 3 days per week MWF for 3 hours at a time. She also has a VNS nurse who comes to care for her chronic venous stasis on her b/l LE.     Subjective: No acute events overnight. Pt resting comfortably in bed. No pain or new complaints.    Objective:    Physical:   General: NAD, very small, frail woman with many bruises in various stages of healing  	HEENT: Dry mm, no jvd, no lymphadenopathy  	Cardio: RRR, no M/R/G  	Lung:: CTAB, no wheezes/crackles  	Abd: Soft ntnd normal bowel sounds  Ext: WWP.  B/L venous stasis changes with compression stockings overlying them. Poor hygene on the legs and fungal infection of the toes    Vital Signs Last 24 Hrs  T(C): 37.2 (2018 10:25), Max: 37.2 (2018 10:25)  T(F): 98.9 (2018 10:25), Max: 98.9 (2018 10:25)  HR: 68 (2018 10:25) (67 - 78)  BP: 110/73 (2018 10:25) (103/55 - 138/74)  BP(mean): --  RR: 18 (2018 10:25) (16 - 18)  SpO2: 95% (2018 10:25) (94% - 99%)    .  LABS:                         11.9   10.0  )-----------( 176      ( 2018 11:00 )             36.3         143  |  105  |  27<H>  ----------------------------<  101<H>  4.4   |  31  |  0.95    Ca    8.4      2018 11:00  Mg     2.4         TPro  5.8<L>  /  Alb  2.8<L>  /  TBili  1.0  /  DBili  x   /  AST  66<H>  /  ALT  33  /  AlkPhos  32<L>        Urinalysis Basic - ( 2018 12:48 )    Color: Yellow / Appearance: Clear / S.015 / pH: x  Gluc: x / Ketone: NEGATIVE  / Bili: NEGATIVE / Urobili: 0.2 E.U./dL   Blood: x / Protein: NEGATIVE mg/dL / Nitrite: NEGATIVE   Leuk Esterase: NEGATIVE / RBC: < 5 /HPF / WBC x   Sq Epi: x / Non Sq Epi: Few /HPF / Bacteria: x      CARDIAC MARKERS ( 2018 11:00 )  x     / 0.02 ng/mL / x     / x     / x      CARDIAC MARKERS ( 2018 00:12 )  0.091 ng/mL / x     / x     / x     / x      CARDIAC MARKERS ( 2018 19:30 )  0.090 ng/mL / x     / 1936 U/L / x     / x      CARDIAC MARKERS ( 2018 13:06 )  0.086 ng/mL / x     / 2427 U/L / x     / x                RADIOLOGY, EKG & ADDITIONAL TESTS: Reviewed.

## 2018-06-28 NOTE — DIETITIAN INITIAL EVALUATION ADULT. - PROBLEM SELECTOR PLAN 9
Uptrending troponin (CK peaked) with EKG showing NSR with prolonged QTc 508, no active chest pain or history of chest pain today.  Trend to peak and repeat EKG in am.    #Tinea pedis  -Ketoconazole BID to toes    #Venous stasis   -Consider vascular consult vs. wound care as it needs to be better dressed and cleaned. DOes not appear infected.

## 2018-06-28 NOTE — H&P ADULT - ASSESSMENT
89 pmhx of HTN, CAD admitted after an unwitnessed fall at home. 89 pmhx of HTN admitted after an unwitnessed fall at home.

## 2018-06-28 NOTE — PHYSICAL THERAPY INITIAL EVALUATION ADULT - SHORT TERM MEMORY, REHAB EVAL
pt asked if she did okay (referring to her functional performance) multiple times, unsure if she is being insistent about making sure or if she remembers whether she asked or not./impaired

## 2018-06-28 NOTE — H&P ADULT - PROBLEM SELECTOR PROBLEM 8
Nutrition, metabolism, and development symptoms Troponin level elevated Protein calorie malnutrition

## 2018-06-28 NOTE — H&P ADULT - PROBLEM SELECTOR PLAN 2
Replete K to goal of 4, pt prescribed K last admission so likely runs low frequently. RUQ us showed no acute cholestatic picture, however was not done with doppler.  Would trend CMP and send hepatitis panel to start.

## 2018-06-29 DIAGNOSIS — Z91.89 OTHER SPECIFIED PERSONAL RISK FACTORS, NOT ELSEWHERE CLASSIFIED: ICD-10-CM

## 2018-06-29 LAB
ANION GAP SERPL CALC-SCNC: 11 MMOL/L — SIGNIFICANT CHANGE UP (ref 5–17)
BUN SERPL-MCNC: 23 MG/DL — SIGNIFICANT CHANGE UP (ref 7–23)
CALCIUM SERPL-MCNC: 8.3 MG/DL — LOW (ref 8.4–10.5)
CHLORIDE SERPL-SCNC: 105 MMOL/L — SIGNIFICANT CHANGE UP (ref 96–108)
CO2 SERPL-SCNC: 27 MMOL/L — SIGNIFICANT CHANGE UP (ref 22–31)
CREAT SERPL-MCNC: 1 MG/DL — SIGNIFICANT CHANGE UP (ref 0.5–1.3)
GLUCOSE SERPL-MCNC: 79 MG/DL — SIGNIFICANT CHANGE UP (ref 70–99)
MAGNESIUM SERPL-MCNC: 2.4 MG/DL — SIGNIFICANT CHANGE UP (ref 1.6–2.6)
POTASSIUM SERPL-MCNC: 4 MMOL/L — SIGNIFICANT CHANGE UP (ref 3.5–5.3)
POTASSIUM SERPL-SCNC: 4 MMOL/L — SIGNIFICANT CHANGE UP (ref 3.5–5.3)
SODIUM SERPL-SCNC: 143 MMOL/L — SIGNIFICANT CHANGE UP (ref 135–145)

## 2018-06-29 PROCEDURE — 99233 SBSQ HOSP IP/OBS HIGH 50: CPT | Mod: GC

## 2018-06-29 RX ORDER — ACETAMINOPHEN 500 MG
650 TABLET ORAL EVERY 6 HOURS
Qty: 0 | Refills: 0 | Status: DISCONTINUED | OUTPATIENT
Start: 2018-06-29 | End: 2018-07-01

## 2018-06-29 RX ADMIN — RALOXIFENE HYDROCHLORIDE 60 MILLIGRAM(S): 60 TABLET, COATED ORAL at 12:52

## 2018-06-29 RX ADMIN — HEPARIN SODIUM 5000 UNIT(S): 5000 INJECTION INTRAVENOUS; SUBCUTANEOUS at 06:41

## 2018-06-29 RX ADMIN — HEPARIN SODIUM 5000 UNIT(S): 5000 INJECTION INTRAVENOUS; SUBCUTANEOUS at 17:22

## 2018-06-29 RX ADMIN — Medication 650 MILLIGRAM(S): at 22:00

## 2018-06-29 RX ADMIN — KETOCONAZOLE 1 APPLICATION(S): 20 AEROSOL, FOAM TOPICAL at 09:41

## 2018-06-29 RX ADMIN — KETOCONAZOLE 1 APPLICATION(S): 20 AEROSOL, FOAM TOPICAL at 22:01

## 2018-06-29 NOTE — PROGRESS NOTE ADULT - PROBLEM SELECTOR PLAN 10
avoid QTc prolonging medications; repeat EKG , monitor lytes    Regular diet, keep mg > 2 K >4    PPX: HSQ avoid QTc prolonging medications; repeat EKG tomorrow, monitor lytes    Regular diet, keep mg > 2 K >4    PPX: HSQ PMD: Dr. Akbar Hicks, [phone]  PMD contacted: 6/28/18  PMD endorsed seeing patient regularly and adjusting her medications regularly. PMD aware of current admission and issues

## 2018-06-29 NOTE — PROGRESS NOTE ADULT - ASSESSMENT
89 pmhx of HTN, HLD, CKD, diastolic HF admitted after an unwitnessed fall at home.    Problem/Plan - 1:  ·  Problem: Non-traumatic rhabdomyolysis.  Plan: in setting of fall, monitor CK.     Problem/Plan - 2:  ·  Problem: Transaminitis.  Plan: RUQ us showed no acute cholestatic picture, however was not done with doppler.  Trend CMP  LFTs downtrending.     Problem/Plan - 3:  ·  Problem: Fall, initial encounter.  [facility]   No signs of infection or syncope.  Seen by rehab, no focal weakness identified

## 2018-06-29 NOTE — DISCHARGE NOTE ADULT - PROVIDER TOKENS
FREE:[LAST:[Hicks],FIRST:[Akbar],PHONE:[phone]:[(   [phone]    -],ADDRESS:[[address] [place] [address]]]

## 2018-06-29 NOTE — ADVANCED PRACTICE NURSE CONSULT - ASSESSMENT
Patient presented with daily scaly skin to bilateral LEs. Cleansed LEs and applied moisture barrier cream.

## 2018-06-29 NOTE — ADVANCED PRACTICE NURSE CONSULT - RECOMMEDATIONS
Use Pressure Ulcer Treatment Guidelines as reference.
Bilateral LE dry, scaly skin - apply moisture barrier cream once daily.  Discussed assessment and recommendations with Shannon ESQUIVEL.

## 2018-06-29 NOTE — PROGRESS NOTE ADULT - SUBJECTIVE AND OBJECTIVE BOX
Physical Medicine and Rehabilitation Progress Note:    Patient is a 89y old  Female who presents with a chief complaint of Fall (29 Jun 2018 09:17)      HPI:  89F pmhx of HTN admitted after being found down at her apartment after apparent fall.  She says that she was sitting on her couch and went to reach for a magazine on the floor and tipped over.  She thinks she was on the floor for less than 20 minutes but isn't sure.  She denies hitting her head (has scab on her head, she says they have been there for a while).  She has multiple bruises over her body and she says its because her apartment is small and she runs into things a lot.  She denies any LOC, chest pain, palpitations, dizziness or predromal symptoms.  She has no other complaints of cough, congestion, dysuria, frequency, abdominal pain, chest pain, nausea or vomiting.   She lives alone in elevator building and has HHA 3 days per week MWF for 3 hours at a time. She also has a VNS nurse who comes to care for her chronic venous stasis on her b/l LE.     IN ED:  T 97.6 HR 69 /57 RR 16 O2 97% (28 Jun 2018 04:24)                            11.9   10.0  )-----------( 176      ( 28 Jun 2018 11:00 )             36.3       06-29    143  |  105  |  23  ----------------------------<  79  4.0   |  27  |  1.00    Ca    8.3<L>      29 Jun 2018 08:31  Mg     2.4     06-29    TPro  5.8<L>  /  Alb  2.8<L>  /  TBili  1.0  /  DBili  x   /  AST  66<H>  /  ALT  33  /  AlkPhos  32<L>  06-28    Vital Signs Last 24 Hrs  T(C): 37 (29 Jun 2018 08:54), Max: 37.2 (28 Jun 2018 15:54)  T(F): 98.6 (29 Jun 2018 08:54), Max: 98.9 (28 Jun 2018 15:54)  HR: 64 (29 Jun 2018 08:54) (64 - 69)  BP: 92/62 (29 Jun 2018 08:54) (92/62 - 107/67)  BP(mean): --  RR: 16 (29 Jun 2018 08:54) (16 - 18)  SpO2: 95% (29 Jun 2018 08:54) (95% - 98%)    MEDICATIONS  (STANDING):  heparin  Injectable 5000 Unit(s) SubCutaneous every 12 hours  ketoconazole 2% Cream 1 Application(s) Topical two times a day  raloxifene 60 milliGRAM(s) Oral daily    MEDICATIONS  (PRN):    Currently Undergoing Physical Therapy at bedside.    Functional Status Assessment:      Previous Level of Function:     · Ambulation Skills	independent; needs device	  · Transfer Skills	independent; needs device	  · ADL Skills	independent; needs device	  · Additional Comments	Pt lives in elevator building with a HHA that comes 3 days/week for 3 hours at a time. Pt states all of her cooking is prepared for her. Pt reports no other recent fall and uses a cane or walker at baseline.	    Cognitive Status Examination:   · Orientation	oriented to person, place, time and situation	  · Level of Consciousness	lethargic/somnolent	  · Follows Commands and Answers Questions	100% of the time	  · Personal Safety and Judgment	intact	  · Short Term Memory	impaired  pt asked if she did okay (referring to her functional performance) multiple times, unsure if she is being insistent about making sure or if she remembers whether she asked or not.	  · Long Term Memory	intact	    Peripheral Neurovascular:     Neurovascular Assessment:   · LLE	warm; no discoloration; no tingling; no numbness	  · RLE	warm; no discoloration; no tingling; no numbness	    Range of Motion Exam:   · Active Range of Motion Examination	bilateral  lower extremity Active ROM was WFL (within functional limits); bilateral upper extremity Active ROM was WFL (within functional limits)	    Manual Muscle Testing:   · Manual Muscle Testing Results	pt grossly assessed at 4/5 systemically	    Bed Mobility: Rolling/Turning:     · Level of [place]	supervision	    Bed Mobility: Scooting/Bridging:     · Level of [place]	moderate assist (50% patients effort); maximum assist (25% patients effort)	  · Physical Assist/Nonphysical Assist	1 person assist	    Bed Mobility: Sit to Supine:     · Level of [place]	moderate assist (50% patients effort); maximum assist (25% patients effort)	  · Physical Assist/Nonphysical Assist	1 person assist	    Bed Mobility: Supine to Sit:     · Level of [place]	moderate assist (50% patients effort)	  · Physical Assist/Nonphysical Assist	1 person assist	    Bed Mobility Analysis:     · Bed Mobility Limitations	decreased ability to use arms for pushing/pulling; decreased ability to use legs for bridging/pushing; impaired ability to control trunk for mobility	  · Impairments Contributing to Impaired Bed Mobility	pain; impaired postural control; decreased strength; impaired balance	    Transfer: Sit to Stand:     · Level of [place]	moderate assist (50% patients effort)	  · Physical Assist/Nonphysical Assist	1 person assist	  · Weight-Bearing Restrictions	full weight-bearing	  · Assistive Device	b/l HHA	    Transfer: Stand to Sit:     · Level of [place]	moderate assist (50% patients effort)	  · Physical Assist/Nonphysical Assist	1 person assist	  · Weight-Bearing Restrictions	full weight-bearing	  · Assistive Device	b/l HHA	    Sit/Stand Transfer Safety Analysis:     · Transfer Safety Concerns Noted	losing balance; inability to maintain weight-bearing restrictions w/o assist	  · Impairments Contributing to Impaired Transfers	impaired balance; impaired postural control; decreased strength	    Gait Skills:     · Level of [place]	moderate assist (50% patients effort)	  · Physical Assist/Nonphysical Assist	1 person assist	  · Weight-Bearing Restrictions	full weight-bearing	  · Assistive Device	b/l HHA	  · Gait Distance	3 side steps along bedside	    Gait Analysis:     · Gait Pattern Used	swing-to gait	  · Gait Deviations Noted	decreased nerissa; increased time in double stance; decreased step length; decreased weight-shifting ability	  · Impairments Contributing to Gait Deviations	impaired balance; pain; decreased strength	    Balance Skills Assessment:     · Sitting Balance: Static	good balance	  · Sitting Balance: Dynamic	fair balance	  · Sit-to-Stand Balance	poor balance	  · Standing Balance: Static	poor balance	  · Standing Balance: Dynamic	poor balance	  · Systems Impairment Contributing to Balance Disturbance	musculoskeletal	    Sensory Examination:   Sensory Examination:    Grossly Intact:   · Gross Sensory Examination	Grossly Intact	      Clinical Impressions:   · Criteria for Skilled Therapeutic Interventions	impairments found; functional limitations in following categories; risk reduction/prevention; rehab potential; therapy frequency; anticipated discharge recommendation	  · Impairments Found (describe specific impairments)	gait, locomotion, and balance	  · Functional Limitations in Following Categories (describe specific limitations)	self-care; home management	  · Risk Reduction/Prevention (Describe Specific Areas of risk reduction/prevention)	risk factors	  · Risk Areas	fall	  · Rehab Potential	fair, will monitor progress closely	  · Therapy Frequency	2-3x/week	        PM&R Impression: as above    Disposition Plan Recommendations:  subacute rehab placement

## 2018-06-29 NOTE — DISCHARGE NOTE ADULT - PATIENT PORTAL LINK FT
You can access the [facility], offered by [facility], by registering with the following website: [email or web address]

## 2018-06-29 NOTE — DISCHARGE NOTE ADULT - PLAN OF CARE
Avoid future falls With regular physical therapy to work on gait, strength, and balance, you can avoid future falls Steady gait Regular physical therapy will help with steady gait and balance Maintain blood pressure within normal range Continue home medications and follow up with your primary doctor if symptoms worsen Prevent progression of renal disease Prevent worsening of heart failure and prevent exacerbations Prevent progression of hyperlipidemia Prevent worsening of osteoporosis and future falls

## 2018-06-29 NOTE — PROGRESS NOTE ADULT - PROBLEM SELECTOR PLAN 8
She appears dry and has difficulty making food for herself.  Her albumin is 2.2.  Replete electrolytes and feed regular diet. She appears dry and has difficulty making food for herself.  Her albumin is 2.2.  Replete electrolytes and feed regular diet.    #osteoporosis  continue home avista Severe protein caloric malnutrition: She appears dry and has difficulty making food for herself.  Her albumin is 2.2.  Replete electrolytes and feed regular diet.    #osteoporosis  continue home avista

## 2018-06-29 NOTE — DISCHARGE NOTE ADULT - MEDICATION SUMMARY - MEDICATIONS TO TAKE
I will START or STAY ON the medications listed below when I get home from the hospital:    enalapril 5 mg oral tablet  -- 1 tab(s) by mouth once a day  -- Indication: For Htn    LORazepam 0.5 mg oral tablet  -- Indication: For Pain    pravastatin 40 mg oral tablet  -- 1 tab(s) by mouth once a day  -- Indication: For HLD (hyperlipidemia)    raloxifene 60 mg oral tablet  -- 1 tab(s) by mouth once a day  -- Indication: For Osteoporosis    atenolol 25 mg oral tablet  -- 1 tab(s) by mouth once a day  -- Indication: For Htn    ketoconazole 2% topical cream  -- 1 application on skin 2 times a day  -- Indication: For Skin care    metOLazone 2.5 mg oral tablet  -- 1 tab(s) by mouth once a day  -- Indication: For CKD (chronic kidney disease)    Lasix 20 mg oral tablet  -- 1 tab(s) by mouth once a day  -- Indication: For CKD (chronic kidney disease)    potassium chloride 10 mEq oral capsule, extended release  -- 1 cap(s) by mouth 2 times a day  -- Indication: For low K+    polymyxin B-trimethoprim 10,000 units-1 mg/mL ophthalmic solution  -- 1 drop(s) to each affected eye every 4 hours  -- Indication: For Conjunctivitis

## 2018-06-29 NOTE — PROGRESS NOTE ADULT - PROBLEM SELECTOR PLAN 2
RUQ us showed no acute cholestatic picture, however was not done with doppler.  Trend CMP  LFTs downtrending today RUQ us showed no acute cholestatic picture, however was not done with doppler.  Trend CMP  LFTs downtrending

## 2018-06-29 NOTE — PROGRESS NOTE ADULT - PROBLEM SELECTOR PLAN 9
Uptrending troponin (CK peaked) with EKG showing NSR with prolonged QTc 508, no active chest pain or history of chest pain today.  Trend to peak and repeat EKG     #Tinea pedis  -Ketoconazole BID to toes    #Venous stasis   -Consider vascular consult vs. wound care as it needs to be better dressed and cleaned. DOes not appear infected. Uptrending troponin (CK peaked) with EKG showing NSR with prolonged QTc 508, no active chest pain or history of chest pain.    #Tinea pedis  -Ketoconazole BID to toes    #Venous stasis   -Consider vascular consult vs. wound care as it needs to be better dressed and cleaned. DOes not appear infected. Uptrending troponin (CK peaked) with EKG showing NSR with prolonged QTc 508, no active chest pain or history of chest pain.    #prolonged QTc  avoid QTc prolonging medications; repeat EKG tomorrow, monitor lytes      #Tinea pedis  -Ketoconazole BID to toes    #Venous stasis   -Consider vascular consult vs. wound care as it needs to be better dressed and cleaned. DOes not appear infected.    #prophylaxis  Regular diet, keep mg > 2 K >4  PPX: HSQ

## 2018-06-29 NOTE — DISCHARGE NOTE ADULT - CARE PLAN
Principal Discharge DX:	Fall, initial encounter  Goal:	Avoid future falls  Assessment and plan of treatment:	With regular physical therapy to work on gait, strength, and balance, you can avoid future falls  Secondary Diagnosis:	Gait instability  Goal:	Steady gait  Assessment and plan of treatment:	Regular physical therapy will help with steady gait and balance  Secondary Diagnosis:	Hypertension  Goal:	Maintain blood pressure within normal range  Assessment and plan of treatment:	Continue home medications and follow up with your primary doctor if symptoms worsen  Secondary Diagnosis:	CKD (chronic kidney disease)  Goal:	Prevent progression of renal disease  Assessment and plan of treatment:	Continue home medications and follow up with your primary doctor if symptoms worsen  Secondary Diagnosis:	CHF (congestive heart failure)  Goal:	Prevent worsening of heart failure and prevent exacerbations  Assessment and plan of treatment:	Continue home medications and follow up with your primary doctor if symptoms worsen  Secondary Diagnosis:	HLD (hyperlipidemia)  Goal:	Prevent progression of hyperlipidemia  Assessment and plan of treatment:	Continue home medications and follow up with your primary doctor if symptoms worsen  Secondary Diagnosis:	Osteoporosis  Goal:	Prevent worsening of osteoporosis and future falls  Assessment and plan of treatment:	Continue home medications and follow up with your primary doctor if symptoms worsen

## 2018-06-29 NOTE — DISCHARGE NOTE ADULT - SECONDARY DIAGNOSIS.
Gait instability Hypertension CKD (chronic kidney disease) CHF (congestive heart failure) HLD (hyperlipidemia) Osteoporosis

## 2018-06-29 NOTE — DISCHARGE NOTE ADULT - HOSPITAL COURSE
89F pmhx of HTN, HLD, CKD, diastolic HF, brought in by HHA after being found down at her apartment after apparent fall.  She says that she was sitting on her couch and went to reach for a magazine on the floor and tipped over.  She thinks she was on the floor for less than 20 minutes but isn't sure.  She denies hitting her head (has scab on her head, she says they have been there for a while).  She has multiple bruises over her body and she says its because her apartment is small and she runs into things a lot.  She denies any LOC, chest pain, palpitations, dizziness or predromal symptoms.  She has no other complaints of cough, congestion, dysuria, frequency, abdominal pain, chest pain, nausea or vomiting.   She lives alone in elevator building and has HHA 3 days per week MWF for 3 hours at a time. She also has a VNS nurse who comes to care for her chronic venous stasis on her b/l LE.   Upon admission, was noted to be hypokalemic and potasssium was repleted. Was also found to have transaminitis, and US abdomen showed biliary sludge, with eventual improvement of LFTs. Also found to have rhabdomyalsis, thus home pravastatin was held. During admission vitals remained wnl, thus home BP meds were held. Home diuretics were also held as patient had pre-renal RETA and appeared dry on exam. Troponins were elevated on admission, and EKG showed prolonged QT. Repeat EKG was done. Pt was evaluated by rehab medicine, who found no focal weaknesses, as well as by physical therapy, who found patient would be fit for subacute rehabilitation for gait instability.     On the day of discharge, the patient was seen and examined. Symptoms improved. Vital signs are stable. Labs and imaging reviewed. Patient is medically optimized and hemodynamically stable. Return precautions discussed, medication teach back done, and importance of physician followup emphasized. The patient verbalized understanding. 89F pmhx of HTN, HLD, CKD, diastolic HF, brought in by HHA after being found down at her apartment after apparent fall.  She says that she was sitting on her couch and went to reach for a magazine on the floor and tipped over.  She thinks she was on the floor for less than 20 minutes, denies hitting her head (has scab on her head, she says they have been there for a while), and denies any LOC, chest pain, palpitations, dizziness or predromal symptoms.  She has multiple bruises over her body and she says its because her apartment is small and she runs into things a lot.  She has no other complaints of cough, congestion, dysuria, frequency, abdominal pain, chest pain, nausea or vomiting.   She lives alone in elevator building and has A 3 days per week MWF for 3 hours at a time. She also has a VNS nurse who comes to care for her chronic venous stasis on her b/l LE.   Upon admission, was noted to be hypokalemic and potassium was repleted. Was also found to have transaminitis, and US abdomen showed biliary sludge, with eventual improvement of LFTs. Also found to have rhabdomyolysis thus home pravastatin was held. During admission vitals remained wnl, thus home BP meds were held. Home diuretics were also held as patient had pre-renal RETA and appeared dry on exam. Troponins were elevated on admission, and EKG showed prolonged QT. Repeat EKG was done. Pt was evaluated by rehab medicine, who found no focal weaknesses, as well as by physical therapy, who found patient would be fit for subacute rehabilitation for gait instability.     On the day of discharge, the patient was seen and examined. Symptoms improved. Vital signs are stable. Labs and imaging reviewed. Patient is medically optimized and hemodynamically stable. Return precautions discussed, medication teach back done, and importance of physician followup emphasized. The patient verbalized understanding.

## 2018-06-29 NOTE — PROGRESS NOTE ADULT - SUBJECTIVE AND OBJECTIVE BOX
Hospital Course:   89F pmhx of HTN, HLD, CKD, diastolic HF, brought in by [facility]HA after being found down at her apartment after apparent fall.  She says that she was sitting on her couch and went to reach for a magazine on the floor and tipped over.  She thinks she was on the floor for less than 20 minutes but isn't sure.  She denies hitting her head (has scab on her head, she says they have been there for a while).  She has multiple bruises over her body and she says its because her apartment is small and she runs into things a lot.  She denies any LOC, chest pain, palpitations, dizziness or predromal symptoms.  She has no other complaints of cough, congestion, dysuria, frequency, abdominal pain, chest pain, nausea or vomiting.   She lives alone in elevator building and has [facility]HA 3 days per week MWF for 3 hours at a time. She also has a VNS nurse who comes to care for her chronic venous stasis on her b/l LE.     Subjective: No acute events overnight. Pt resting comfortably in bed. No pain or new complaints.    Objective:  Vital Signs Last 24 Hrs  T(C): 37.2 (2018 04:59), Max: 37.2 (2018 10:25)  T(F): 98.9 (2018 04:59), Max: 98.9 (2018 10:25)  HR: 69 (2018 04:59) (68 - 69)  BP: 101/55 (2018 04:59) (96/64 - 110/73)  BP(mean): --  RR: 18 (2018 04:59) (17 - 18)  SpO2: 95% (2018 04:59) (95% - 98%)    PHYSICAL EXAM:    Constitutional:  resting comfortably in bed; NAD; very small, frail woman  Head: NC/AT  ENT: MMM  Respiratory: CTA B/L; no W/R/R, no retractions  Cardiac: +S1/S2; RRR; no M/R/G  Gastrointestinal: soft, NT/ND; no rebound or guarding; +BSx4  Extremities: WWP, many bruises in various stages of healing  Lymphatic: no submandibular or cervical LAD  Neurologic: AAOx3; CNII-XII grossly intact; no focal deficits  Psychiatric: affect and characteristics of appearance, verbalizations, behaviors are appropriate       	HEENT: Dry mm, no jvd, no lymphadenopathy  	Cardio: RRR, no M/R/G  	Lung:: CTAB, no wheezes/crackles  	Abd: Soft ntnd normal bowel sounds  Ext: WWP.  B/L venous stasis changes with compression stockings overlying them. Poor hygene on the legs and fungal infection of the toes    .  LABS:                         11.9   10.0  )-----------( 176      ( 2018 11:00 )             36.3         143  |  105  |  27<H>  ----------------------------<  101<H>  4.4   |  31  |  0.95    Ca    8.4      2018 11:00  Mg     2.4         TPro  5.8<L>  /  Alb  2.8<L>  /  TBili  1.0  /  DBili  x   /  AST  66<H>  /  ALT  33  /  AlkPhos  32<L>        Urinalysis Basic - ( 2018 12:48 )    Color: Yellow / Appearance: Clear / S.015 / pH: x  Gluc: x / Ketone: NEGATIVE  / Bili: NEGATIVE / Urobili: 0.2 E.U./dL   Blood: x / Protein: NEGATIVE mg/dL / Nitrite: NEGATIVE   Leuk Esterase: NEGATIVE / RBC: < 5 /HPF / WBC x   Sq Epi: x / Non Sq Epi: Few /HPF / Bacteria: x      CARDIAC MARKERS ( 2018 11:00 )  x     / 0.02 ng/mL / x     / x     / x      CARDIAC MARKERS ( 2018 00:12 )  0.091 ng/mL / x     / x     / x     / x      CARDIAC MARKERS ( 2018 19:30 )  0.090 ng/mL / x     / 1936 U/L / x     / x      CARDIAC MARKERS ( 2018 13:06 )  0.086 ng/mL / x     / 2427 U/L / x     / x                RADIOLOGY, EKG & ADDITIONAL TESTS: Reviewed. Hospital Course:   89F pmhx of HTN, HLD, CKD, diastolic HF, brought in by HHA after being found down at her apartment after apparent fall.  She says that she was sitting on her couch and went to reach for a magazine on the floor and tipped over.  She thinks she was on the floor for less than 20 minutes but isn't sure.  She denies hitting her head (has scab on her head, she says they have been there for a while).  She has multiple bruises over her body and she says its because her apartment is small and she runs into things a lot.  She denies any LOC, chest pain, palpitations, dizziness or predromal symptoms.  She has no other complaints of cough, congestion, dysuria, frequency, abdominal pain, chest pain, nausea or vomiting.   She lives alone in elevator building and has [facility]HA 3 days per week MWF for 3 hours at a time. She also has a VNS nurse who comes to care for her chronic venous stasis on her b/l LE.     Subjective: No acute events overnight. Pt resting comfortably in bed. No pain or new complaints.    Objective:  Vital Signs Last 24 Hrs  T(C): 37.2 (2018 04:59), Max: 37.2 (2018 10:25)  T(F): 98.9 (2018 04:59), Max: 98.9 (2018 10:25)  HR: 69 (2018 04:59) (68 - 69)  BP: 101/55 (2018 04:59) (96/64 - 110/73)  BP(mean): --  RR: 18 (2018 04:59) (17 - 18)  SpO2: 95% (2018 04:59) (95% - 98%)    PHYSICAL EXAM:    Constitutional:  resting comfortably in bed; NAD; very small, frail woman  Head: NC/AT  ENT: MMM  Respiratory: CTA B/L; no W/R/R, no retractions  Cardiac: +S1/S2; RRR; no M/R/G  Gastrointestinal: soft, NT/ND; no rebound or guarding; +BSx4  Extremities: WWP, many bruises in various stages of healing. B/L venous stasis changes with compression stockings overlying them. Poor hygene on the legs and fungal infection of the toes  Psychiatric: Alert. affect and characteristics of appearance, verbalizations, behaviors are appropriate.       .  LABS:                         11.9   10.0  )-----------( 176      ( 2018 11:00 )             36.3         143  |  105  |  27<H>  ----------------------------<  101<H>  4.4   |  31  |  0.95    Ca    8.4      2018 11:00  Mg     2.4         TPro  5.8<L>  /  Alb  2.8<L>  /  TBili  1.0  /  DBili  x   /  AST  66<H>  /  ALT  33  /  AlkPhos  32<L>        Urinalysis Basic - ( 2018 12:48 )    Color: Yellow / Appearance: Clear / S.015 / pH: x  Gluc: x / Ketone: NEGATIVE  / Bili: NEGATIVE / Urobili: 0.2 E.U./dL   Blood: x / Protein: NEGATIVE mg/dL / Nitrite: NEGATIVE   Leuk Esterase: NEGATIVE / RBC: < 5 /HPF / WBC x   Sq Epi: x / Non Sq Epi: Few /HPF / Bacteria: x      CARDIAC MARKERS ( 2018 11:00 )  x     / 0.02 ng/mL / x     / x     / x      CARDIAC MARKERS ( 2018 00:12 )  0.091 ng/mL / x     / x     / x     / x      CARDIAC MARKERS ( 2018 19:30 )  0.090 ng/mL / x     / 1936 U/L / x     / x      CARDIAC MARKERS ( 2018 13:06 )  0.086 ng/mL / x     / 2427 U/L / x     / x                RADIOLOGY, EKG & ADDITIONAL TESTS: Reviewed. Hospital Course:   89F pmhx of HTN, HLD, CKD, diastolic HF, brought in by [facility] after being found down at her apartment after apparent fall.  She says that she was sitting on her couch and went to reach for a magazine on the floor and tipped over.  She thinks she was on the floor for less than 20 minutes but isn't sure.  She denies hitting her head (has scab on her head, she says they have been there for a while).  She has multiple bruises over her body and she says its because her apartment is small and she runs into things a lot.  She denies any LOC, chest pain, palpitations, dizziness or predromal symptoms.  She has no other complaints of cough, congestion, dysuria, frequency, abdominal pain, chest pain, nausea or vomiting.   She lives alone in elevator building and has [facility] 3 days per week MWF for 3 hours at a time. She also has a VNS nurse who comes to care for her chronic venous stasis on her b/l LE.     Subjective: No acute events overnight. Pt resting comfortably in bed. No pain or new complaints.    Objective:  Vital Signs Last 24 Hrs  T(C): 37.2 (2018 04:59), Max: 37.2 (2018 10:25)  T(F): 98.9 (2018 04:59), Max: 98.9 (2018 10:25)  HR: 69 (2018 04:59) (68 - 69)  BP: 101/55 (2018 04:59) (96/64 - 110/73)  BP(mean): --  RR: 18 (2018 04:59) (17 - 18)  SpO2: 95% (2018 04:59) (95% - 98%)    PHYSICAL EXAM:    Constitutional:  resting comfortably in bed; NAD; very small, frail woman  Head: NC/AT  ENT: dry mucous membranes  Respiratory: CTA B/L; no W/R/R, no retractions  Cardiac: +S1/S2; RRR; no M/R/G  Gastrointestinal: soft, NT/ND; no rebound or guarding; +BSx4  Extremities: WWP, many bruises in various stages of healing. B/L venous stasis changes with compression stockings overlying them. Poor hygene on the legs and fungal infection of the toes  Psychiatric: Alert. affect and characteristics of appearance, verbalizations, behaviors are appropriate.       .  LABS:                         11.9   10.0  )-----------( 176      ( 2018 11:00 )             36.3         143  |  105  |  27<H>  ----------------------------<  101<H>  4.4   |  31  |  0.95    Ca    8.4      2018 11:00  Mg     2.4         TPro  5.8<L>  /  Alb  2.8<L>  /  TBili  1.0  /  DBili  x   /  AST  66<H>  /  ALT  33  /  AlkPhos  32<L>        Urinalysis Basic - ( 2018 12:48 )    Color: Yellow / Appearance: Clear / S.015 / pH: x  Gluc: x / Ketone: NEGATIVE  / Bili: NEGATIVE / Urobili: 0.2 E.U./dL   Blood: x / Protein: NEGATIVE mg/dL / Nitrite: NEGATIVE   Leuk Esterase: NEGATIVE / RBC: < 5 /HPF / WBC x   Sq Epi: x / Non Sq Epi: Few /HPF / Bacteria: x      CARDIAC MARKERS ( 2018 11:00 )  x     / 0.02 ng/mL / x     / x     / x      CARDIAC MARKERS ( 2018 00:12 )  0.091 ng/mL / x     / x     / x     / x      CARDIAC MARKERS ( 2018 19:30 )  0.090 ng/mL / x     / 1936 U/L / x     / x      CARDIAC MARKERS ( 2018 13:06 )  0.086 ng/mL / x     / 2427 U/L / x     / x                RADIOLOGY, EKG & ADDITIONAL TESTS: Reviewed. Hospital Course:   89F pmhx of HTN, HLD, CKD, diastolic HF, brought in by HHA after being found down at her apartment after apparent fall.  She says that she was sitting on her couch and went to reach for a magazine on the floor and tipped over.  She thinks she was on the floor for less than 20 minutes but isn't sure.  She denies hitting her head (has scab on her head, she says they have been there for a while).  She has multiple bruises over her body and she says its because her apartment is small and she runs into things a lot.  She denies any LOC, chest pain, palpitations, dizziness or predromal symptoms.  She has no other complaints of cough, congestion, dysuria, frequency, abdominal pain, chest pain, nausea or vomiting.   She lives alone in elevator building and has [facility]HA 3 days per week MWF for 3 hours at a time. She also has a VNS nurse who comes to care for her chronic venous stasis on her b/l LE.     Subjective: No acute events overnight. Pt resting comfortably in bed. No pain or new complaints.    Objective:  Vital Signs Last 24 Hrs  T(C): 37.2 (2018 04:59), Max: 37.2 (2018 10:25)  T(F): 98.9 (2018 04:59), Max: 98.9 (2018 10:25)  HR: 69 (2018 04:59) (68 - 69)  BP: 101/55 (2018 04:59) (96/64 - 110/73)  BP(mean): --  RR: 18 (2018 04:59) (17 - 18)  SpO2: 95% (2018 04:59) (95% - 98%)    PHYSICAL EXAM:    Constitutional:  resting comfortably in bed; NAD; very small, frail woman  Head: NC/AT  ENT: dry mucous membranes  Respiratory: CTA B/L; no W/R/R, no retractions  Cardiac: +S1/S2; RRR; no M/R/G  Gastrointestinal: soft, NT/ND; no rebound or guarding; +BSx4  Extremities: WWP, many bruises in various stages of healing. B/L venous stasis changes with compression stockings overlying them. Poor hygiene on the legs and fungal infection of the toes  Psychiatric: Alert. affect and characteristics of appearance, verbalizations, behaviors are appropriate.       .  LABS:                         11.9   10.0  )-----------( 176      ( 2018 11:00 )             36.3         143  |  105  |  27<H>  ----------------------------<  101<H>  4.4   |  31  |  0.95    Ca    8.4      2018 11:00  Mg     2.4         TPro  5.8<L>  /  Alb  2.8<L>  /  TBili  1.0  /  DBili  x   /  AST  66<H>  /  ALT  33  /  AlkPhos  32<L>        Urinalysis Basic - ( 2018 12:48 )    Color: Yellow / Appearance: Clear / S.015 / pH: x  Gluc: x / Ketone: NEGATIVE  / Bili: NEGATIVE / Urobili: 0.2 E.U./dL   Blood: x / Protein: NEGATIVE mg/dL / Nitrite: NEGATIVE   Leuk Esterase: NEGATIVE / RBC: < 5 /HPF / WBC x   Sq Epi: x / Non Sq Epi: Few /HPF / Bacteria: x      CARDIAC MARKERS ( 2018 11:00 )  x     / 0.02 ng/mL / x     / x     / x      CARDIAC MARKERS ( 2018 00:12 )  0.091 ng/mL / x     / x     / x     / x      CARDIAC MARKERS ( 2018 19:30 )  0.090 ng/mL / x     / 1936 U/L / x     / x      CARDIAC MARKERS ( 2018 13:06 )  0.086 ng/mL / x     / 2427 U/L / x     / x                RADIOLOGY, EKG & ADDITIONAL TESTS: Reviewed.

## 2018-06-29 NOTE — ADVANCED PRACTICE NURSE CONSULT - REASON FOR CONSULT
[facility] nurse consult to assess stage 2 pressure injury present on admission. Discussed with RNShannon use of Pressure Ulcer Guidelines as reference for stage 1 and 2 pressure injuries that are present on admission. Shannon aware of Guidelines and had not requested consult.

## 2018-06-29 NOTE — PROGRESS NOTE ADULT - PROBLEM SELECTOR PLAN 4
see above    #diastolic HF  continue home meds lasix 40. Hold metaolazone 2.5 for now as pt appears dry on exam. see above    #diastolic HF  Hold home meds lasix 40 and metaolazone 2.5 for now as pt appears dry on exam.

## 2018-06-29 NOTE — PROGRESS NOTE ADULT - PROBLEM SELECTOR PLAN 7
Hx of HTN but not hypertensive here.  Hold antihypertensives for now    #HLD  hold home med pravastatin 40 mg for now given transaminitis and rhabdo.

## 2018-06-29 NOTE — DISCHARGE NOTE ADULT - CARE PROVIDER_API CALL
Akbar Hicks  [address] [place] [address], [place] [address]  Phone: [phone]  Fax: (       -

## 2018-06-29 NOTE — ADVANCED PRACTICE NURSE CONSULT - REASON FOR CONSULT
[facility] nurse consult to assess bilateral LE skin. 89F pmhx of HTN admitted after being found down at her apartment after apparent fall.  She stated that she was sitting on her couch and went to reach for a magazine on the floor and tipped over.  Patient denied any LOC, chest pain, palpitations, dizziness or predromal symptoms.  She lives alone in elevator building and has [facility] 3 days per week MWF for 3 hours a day. She also has a VNS nurse who comes to care for her chronic venous stasis on her b/l LE.

## 2018-06-29 NOTE — PROGRESS NOTE ADULT - PROBLEM SELECTOR PLAN 3
PT eval for mechanical fall. No signs of infection or syncope. Mechcanical fall.   No signs of infection or syncope.  Seen by rehab, no focal weakness identified  Seen by PT, suggested for KRIS

## 2018-06-30 LAB — RAPID RVP RESULT: SIGNIFICANT CHANGE UP

## 2018-06-30 RX ORDER — POLYMYXIN B SULF/TRIMETHOPRIM 10000-1/ML
1 DROPS OPHTHALMIC (EYE) EVERY 4 HOURS
Qty: 0 | Refills: 0 | Status: DISCONTINUED | OUTPATIENT
Start: 2018-06-30 | End: 2018-07-01

## 2018-06-30 RX ADMIN — HEPARIN SODIUM 5000 UNIT(S): 5000 INJECTION INTRAVENOUS; SUBCUTANEOUS at 17:22

## 2018-06-30 RX ADMIN — Medication 1 DROP(S): at 17:23

## 2018-06-30 RX ADMIN — KETOCONAZOLE 1 APPLICATION(S): 20 AEROSOL, FOAM TOPICAL at 11:14

## 2018-06-30 RX ADMIN — RALOXIFENE HYDROCHLORIDE 60 MILLIGRAM(S): 60 TABLET, COATED ORAL at 13:16

## 2018-06-30 RX ADMIN — Medication 1 DROP(S): at 13:26

## 2018-06-30 RX ADMIN — KETOCONAZOLE 1 APPLICATION(S): 20 AEROSOL, FOAM TOPICAL at 21:26

## 2018-06-30 RX ADMIN — HEPARIN SODIUM 5000 UNIT(S): 5000 INJECTION INTRAVENOUS; SUBCUTANEOUS at 06:42

## 2018-06-30 RX ADMIN — Medication 1 DROP(S): at 21:26

## 2018-06-30 NOTE — PROGRESS NOTE ADULT - PROBLEM SELECTOR PLAN 10
PMD: Dr. Akbar Hicks, [phone]  PMD contacted: 6/28/18  PMD endorsed seeing patient regularly and adjusting her medications regularly. PMD aware of current admission and issues

## 2018-06-30 NOTE — PROGRESS NOTE ADULT - SUBJECTIVE AND OBJECTIVE BOX
PGY-1 PROGRESS NOTE    Hospital Course:   89F pmhx of HTN, HLD, CKD, diastolic HF, brought in by HHA after being found down at her apartment after apparent fall.  She says that she was sitting on her couch and went to reach for a magazine on the floor and tipped over.  She thinks she was on the floor for less than 20 minutes but isn't sure.  She denies hitting her head (has scab on her head, she says they have been there for a while).  She has multiple bruises over her body and she says its because her apartment is small and she runs into things a lot.  She denies any LOC, chest pain, palpitations, dizziness or predromal symptoms.  She has no other complaints of cough, congestion, dysuria, frequency, abdominal pain, chest pain, nausea or vomiting.   She lives alone in elevator building and has [facility]HA 3 days per week MWF for 3 hours at a time. She also has a VNS nurse who comes to care for her chronic venous stasis on her b/l LE.     Subjective: Patient had a 100.8 fever at 21:26 yesterday, received tylenol and improved to 98.4. Patient has no active complaints, slept well, drinking Ensure at bedside. Patient eyes noted to have yellow crusting and conjunctivitis, was swabbed for viral conjunctivitis.     Objective:  Vital Signs Last 24 Hrs  T(C): 37 (30 Jun 2018 15:41), Max: 38.2 (29 Jun 2018 21:26)  T(F): 98.6 (30 Jun 2018 15:41), Max: 100.8 (29 Jun 2018 21:26)  HR: 76 (30 Jun 2018 15:41) (69 - 99)  BP: 98/63 (30 Jun 2018 15:41) (97/62 - 110/71)  BP(mean): --  RR: 18 (30 Jun 2018 15:41) (17 - 20)  SpO2: 98% (30 Jun 2018 15:41) (95% - 98%)    PHYSICAL EXAM:  Constitutional: pleasant, cooperative, frail  Head: NC/AT  ENT: b/l conjunctivitis, with crusting, L>R. dry mucous membranes  Respiratory: CTA b/l, no W/R/R  Cardiac: +S1/S2; RRR; no M/R/G  Gastrointestinal: soft, NT/ND; no rebound or guarding; +BSx4  Extremities: WWP, many bruises in various stages of healing. B/L venous stasis changes with compression stockings overlying them. Poor hygiene on the legs and fungal infection of the toes. strength 4/5 b/l upper and lower extremities.  Psychiatric: Alert. affect and characteristics of appearance, verbalizations, behaviors are appropriate.       .  LABS:     06-29    143  |  105  |  23  ----------------------------<  79  4.0   |  27  |  1.00    Ca    8.3<L>      29 Jun 2018 08:31  Mg     2.4     06-29        RADIOLOGY, EKG & ADDITIONAL TESTS: Reviewed.

## 2018-06-30 NOTE — PROGRESS NOTE ADULT - PROBLEM SELECTOR PLAN 8
Severe protein caloric malnutrition: She appears dry and has difficulty making food for herself.  Her albumin is 2.2.  Replete electrolytes and feed regular diet.    #osteoporosis  continue home avista

## 2018-06-30 NOTE — PROGRESS NOTE ADULT - PROBLEM SELECTOR PLAN 9
Uptrending troponin (CK peaked) with EKG showing NSR with prolonged QTc 508, no active chest pain or history of chest pain.    #prolonged QTc  avoid QTc prolonging medications; repeat EKG tomorrow, monitor lytes      #Tinea pedis  -Ketoconazole BID to toes    #Venous stasis   -Consider vascular consult vs. wound care as it needs to be better dressed and cleaned. DOes not appear infected.    #prophylaxis  Regular diet, keep mg > 2 K >4  PPX: HSQ

## 2018-06-30 NOTE — PROGRESS NOTE ADULT - PROBLEM SELECTOR PLAN 4
see above    #diastolic HF  Hold home meds lasix 40 and metaolazone 2.5 for now as pt appears dry on exam.

## 2018-06-30 NOTE — PROGRESS NOTE ADULT - PROBLEM SELECTOR PLAN 2
RUQ us showed no acute cholestatic picture, however was not done with doppler.  Trend CMP  LFTs downtrending

## 2018-06-30 NOTE — PROGRESS NOTE ADULT - PROBLEM SELECTOR PLAN 3
Mechcanical fall.   No signs of infection or syncope.  Seen by rehab, no focal weakness identified  Seen by PT, suggested for KRIS  - will consult neurosurgery for mild cord compression Mechcanical fall.   No signs of infection or syncope.  Seen by rehab, no focal weakness identified  Seen by PT, suggested for KRIS

## 2018-06-30 NOTE — PROGRESS NOTE ADULT - ATTENDING COMMENTS
DX  FEVER- source unclear. ? conjunctivitis , but RVP (-). pt w/o upper resp or lower resp sxs. no abd complaints or urinary complaints. will monitor off abx for now.   TRANSAMINITIS-  RUQ u/s unremarkable. LFTs down trending. pt w/o abd tenderness/n/v. will trend LFTs for now  ?CONJUNCTIVITIS - polytrim eye gtts  HTN - off ace inh as BPs at goal currently  LONG QTC - keep k>4. mag >2  CHRONIC dCHF- lasix prn  CKD III- stable
Patient appears euvolemic. We will hold diuretics till tomorrow give pre-renal RETA on admission and reassess tomorrow. F/U PT.
Hold diuretics till discharge. Currently Euvolemic.

## 2018-06-30 NOTE — PROGRESS NOTE ADULT - ASSESSMENT
89 pmhx of HTN, HLD, CKD, diastolic HF admitted after an unwitnessed fall at home. 6/27 CT cervical spine revealed mild cervical cord compression. Patient denies numbness or changes in sensation, but still feels weak. Will consult neurosurgery for recs. 89 pmhx of HTN, HLD, CKD, diastolic HF admitted after an unwitnessed fall at home. Patient denies numbness or changes in sensation today, but still has generalized weakness.

## 2018-07-01 VITALS
OXYGEN SATURATION: 95 % | DIASTOLIC BLOOD PRESSURE: 69 MMHG | TEMPERATURE: 98 F | SYSTOLIC BLOOD PRESSURE: 113 MMHG | HEART RATE: 78 BPM | RESPIRATION RATE: 20 BRPM

## 2018-07-01 DIAGNOSIS — H10.89 OTHER CONJUNCTIVITIS: ICD-10-CM

## 2018-07-01 DIAGNOSIS — R50.9 FEVER, UNSPECIFIED: ICD-10-CM

## 2018-07-01 LAB
HAV IGM SER-ACNC: SIGNIFICANT CHANGE UP
HBV CORE IGM SER-ACNC: SIGNIFICANT CHANGE UP
HBV SURFACE AG SER-ACNC: SIGNIFICANT CHANGE UP
HCT VFR BLD CALC: 38.4 % — SIGNIFICANT CHANGE UP (ref 34.5–45)
HCV AB S/CO SERPL IA: 0.09 S/CO — SIGNIFICANT CHANGE UP
HCV AB SERPL-IMP: SIGNIFICANT CHANGE UP
HGB BLD-MCNC: 12.5 G/DL — SIGNIFICANT CHANGE UP (ref 11.5–15.5)
MCHC RBC-ENTMCNC: 30.9 PG — SIGNIFICANT CHANGE UP (ref 27–34)
MCHC RBC-ENTMCNC: 32.6 G/DL — SIGNIFICANT CHANGE UP (ref 32–36)
MCV RBC AUTO: 95 FL — SIGNIFICANT CHANGE UP (ref 80–100)
PLATELET # BLD AUTO: 223 K/UL — SIGNIFICANT CHANGE UP (ref 150–400)
RBC # BLD: 4.04 M/UL — SIGNIFICANT CHANGE UP (ref 3.8–5.2)
RBC # FLD: 13.8 % — SIGNIFICANT CHANGE UP (ref 10.3–16.9)
WBC # BLD: 10.7 K/UL — HIGH (ref 3.8–10.5)
WBC # FLD AUTO: 10.7 K/UL — HIGH (ref 3.8–10.5)

## 2018-07-01 PROCEDURE — 87086 URINE CULTURE/COLONY COUNT: CPT

## 2018-07-01 PROCEDURE — 76700 US EXAM ABDOM COMPLETE: CPT

## 2018-07-01 PROCEDURE — 96360 HYDRATION IV INFUSION INIT: CPT

## 2018-07-01 PROCEDURE — 80048 BASIC METABOLIC PNL TOTAL CA: CPT

## 2018-07-01 PROCEDURE — 82550 ASSAY OF CK (CPK): CPT

## 2018-07-01 PROCEDURE — 85025 COMPLETE CBC W/AUTO DIFF WBC: CPT

## 2018-07-01 PROCEDURE — 80307 DRUG TEST PRSMV CHEM ANLYZR: CPT

## 2018-07-01 PROCEDURE — 70450 CT HEAD/BRAIN W/O DYE: CPT

## 2018-07-01 PROCEDURE — 87581 M.PNEUMON DNA AMP PROBE: CPT

## 2018-07-01 PROCEDURE — 99285 EMERGENCY DEPT VISIT HI MDM: CPT | Mod: 25

## 2018-07-01 PROCEDURE — 84484 ASSAY OF TROPONIN QUANT: CPT

## 2018-07-01 PROCEDURE — 85027 COMPLETE CBC AUTOMATED: CPT

## 2018-07-01 PROCEDURE — 80053 COMPREHEN METABOLIC PANEL: CPT

## 2018-07-01 PROCEDURE — 96361 HYDRATE IV INFUSION ADD-ON: CPT

## 2018-07-01 PROCEDURE — 93005 ELECTROCARDIOGRAM TRACING: CPT

## 2018-07-01 PROCEDURE — 97161 PT EVAL LOW COMPLEX 20 MIN: CPT

## 2018-07-01 PROCEDURE — 36415 COLL VENOUS BLD VENIPUNCTURE: CPT

## 2018-07-01 PROCEDURE — 72125 CT NECK SPINE W/O DYE: CPT

## 2018-07-01 PROCEDURE — 80074 ACUTE HEPATITIS PANEL: CPT

## 2018-07-01 PROCEDURE — 87486 CHLMYD PNEUM DNA AMP PROBE: CPT

## 2018-07-01 PROCEDURE — 87798 DETECT AGENT NOS DNA AMP: CPT

## 2018-07-01 PROCEDURE — 83735 ASSAY OF MAGNESIUM: CPT

## 2018-07-01 PROCEDURE — 81001 URINALYSIS AUTO W/SCOPE: CPT

## 2018-07-01 PROCEDURE — 87633 RESP VIRUS 12-25 TARGETS: CPT

## 2018-07-01 PROCEDURE — 82248 BILIRUBIN DIRECT: CPT

## 2018-07-01 PROCEDURE — 71045 X-RAY EXAM CHEST 1 VIEW: CPT

## 2018-07-01 PROCEDURE — 82962 GLUCOSE BLOOD TEST: CPT

## 2018-07-01 RX ORDER — POLYMYXIN B SULF/TRIMETHOPRIM 10000-1/ML
1 DROPS OPHTHALMIC (EYE)
Qty: 0 | Refills: 0 | DISCHARGE
Start: 2018-07-01

## 2018-07-01 RX ORDER — KETOCONAZOLE 20 MG/G
1 AEROSOL, FOAM TOPICAL
Qty: 0 | Refills: 0 | DISCHARGE
Start: 2018-07-01

## 2018-07-01 RX ADMIN — RALOXIFENE HYDROCHLORIDE 60 MILLIGRAM(S): 60 TABLET, COATED ORAL at 11:43

## 2018-07-01 RX ADMIN — KETOCONAZOLE 1 APPLICATION(S): 20 AEROSOL, FOAM TOPICAL at 10:40

## 2018-07-01 RX ADMIN — HEPARIN SODIUM 5000 UNIT(S): 5000 INJECTION INTRAVENOUS; SUBCUTANEOUS at 06:24

## 2018-07-01 RX ADMIN — Medication 1 DROP(S): at 10:40

## 2018-07-01 RX ADMIN — Medication 1 DROP(S): at 06:23

## 2018-07-01 NOTE — PROGRESS NOTE ADULT - PROBLEM SELECTOR PLAN 10
PMD: Dr. Akbar Hicks, [phone]  PMD contacted: 6/28/18  PMD endorsed seeing patient regularly and adjusting her medications regularly. PMD aware of current admission and issues

## 2018-07-01 NOTE — PROGRESS NOTE ADULT - PROBLEM SELECTOR PLAN 6
Suspect very dehydrated in accordance with protein calorie malnutrition. Hydrated in [facility] [place]
Suspect very dehydrated in accordance with protein calorie malnutrition. Hydrated in [facility]
Suspect very dehydrated in accordance with protein calorie malnutrition. Hydrated in [facility]
replete prn

## 2018-07-01 NOTE — PROGRESS NOTE ADULT - PROBLEM SELECTOR PROBLEM 1
Fever, unspecified fever cause
Non-traumatic rhabdomyolysis

## 2018-07-01 NOTE — PROGRESS NOTE ADULT - SUBJECTIVE AND OBJECTIVE BOX
Patient is a 89y old  Female who presents with a chief complaint of Fall (29 Jun 2018 09:17)      INTERVAL HPI/OVERNIGHT EVENTS:    feels well  no complaints   denies neck pain, back pain, motor weakness or sensory changes.       ROS  (- ) headache  ( -  )fevers/chills  ( - ) dyspnea  (  - ) cough  (  - ) chest pain  (  - ) palpatations  ( - ) dizziness/lightheadedness  (  - ) nausea/vomiting  (  - ) abd pain  (  - ) diarrhea  (  - ) melena  (  - ) hematochezia  (  - ) dysuria   ( - ) hematuria  (  - ) leg swelling    ( - ) calf tenderness  ( + ) tolerating POs  ( + ) BM  ROS: 12 point review of systems otherwise negative              MEDICATIONS  (STANDING):  heparin  Injectable 5000 Unit(s) SubCutaneous every 12 hours  ketoconazole 2% Cream 1 Application(s) Topical two times a day  raloxifene 60 milliGRAM(s) Oral daily  trimethoprim/polymyxin Solution 1 Drop(s) Both EYES every 4 hours    MEDICATIONS  (PRN):  acetaminophen   Tablet 650 milliGRAM(s) Oral every 6 hours PRN For Temp greater than 38 C (100.4 F)      Allergies    No Known Allergies    Intolerances          Vital Signs Last 24 Hrs  T(C): 36.6 (01 Jul 2018 08:37), Max: 37.6 (30 Jun 2018 20:36)  T(F): 97.8 (01 Jul 2018 08:37), Max: 99.6 (30 Jun 2018 20:36)  HR: 78 (01 Jul 2018 08:37) (65 - 82)  BP: 113/69 (01 Jul 2018 08:37) (98/63 - 125/73)  BP(mean): --  RR: 20 (01 Jul 2018 08:37) (18 - 20)  SpO2: 95% (01 Jul 2018 08:37) (94% - 98%)  CAPILLARY BLOOD GLUCOSE            Physical Exam:    Daily     Daily   General:  Well appearing   HEENT:  yellowish discharge bl eyes, mild injection,  Nonicteric, PERRLA, oral pharynx w/o erythema/exudate,  neck supple  CV:  [id number] , RRR,  no JVD  Lungs:  CTA B/L, no wheezes, rales, rhonchi  Abdomen:  positive BS, Soft, non-tender, non distended, no hepatosplenomegaly  Extremities:  2+ DP/radial pulses b/l, no cyanosis, trace LE edema bl,   Back: no cva or midline tenderness  Skin:  Warm and dry , venous stasis changes in legs bl  :  No oliver  Neuro:  AAOxperson ,year, place,   CN II-XII grossly intact, 5/5 str all 4 ext, sensation intact, (-) dysmetria b/l (-) dysdiadochokinesia bl  No Restraints    LABS:                        12.5   10.7  )-----------( 223      ( 01 Jul 2018 08:12 )             38.4                   RADIOLOGY & ADDITIONAL TESTS:

## 2018-07-01 NOTE — PROGRESS NOTE ADULT - PROBLEM SELECTOR PLAN 4
etiology unclear ---possibly due to statin use  RUQ U/S - unremarkable  Hep panel (-)  LFTs downtrending

## 2018-07-01 NOTE — PROGRESS NOTE ADULT - PROBLEM SELECTOR PLAN 9
suspect due to demand ischemia from fall/trauma     prolonged QTc  avoid QTc prolonging medications;   keep k>4, mag>2      #Tinea pedis  -Ketoconazole BID to toes    #Venous stasis   -Consider vascular consult vs. wound care as it needs to be better dressed and cleaned. DOes not appear infected.    #prophylaxis  Regular diet, keep mg > 2 K >4  PPX: HSQ

## 2018-07-01 NOTE — PROGRESS NOTE ADULT - PROBLEM SELECTOR PLAN 5
Replete K to goal of 4  Pt takes 10 mEq at home as K runs low frequently.
Replete K to goal of 4  Pt takes 10 mEq at home as K runs low frequently.
Mechanical fall.   doubt syncope.  will need KRIS
Replete K to goal of 4  Pt takes 10 mEq at home as K runs low frequently.

## 2018-07-01 NOTE — PROGRESS NOTE ADULT - ASSESSMENT
89 pmhx of HTN, HLD, CKD, diastolic HF admitted after an unwitnessed fall at home. Patient denies numbness or changes in sensation today, but still has generalized weakness.

## 2018-07-01 NOTE — PROGRESS NOTE ADULT - PROBLEM SELECTOR PLAN 1
in setting of fall, monitor CK
resolved  etiology unk  cxr (-), rvp (-), ua - bland. no gi sxs.   monitor
in setting of fall, monitor CK
in setting of fall, monitor CK

## 2018-07-01 NOTE — PROGRESS NOTE ADULT - PROBLEM SELECTOR PROBLEM 3
Fall, initial encounter
Non-traumatic rhabdomyolysis
Fall, initial encounter
Fall, initial encounter

## 2018-07-01 NOTE — PROGRESS NOTE ADULT - PROBLEM SELECTOR PROBLEM 8
Protein calorie malnutrition

## 2018-07-05 DIAGNOSIS — R53.1 WEAKNESS: ICD-10-CM

## 2018-07-05 DIAGNOSIS — R74.0 NONSPECIFIC ELEVATION OF LEVELS OF TRANSAMINASE AND LACTIC ACID DEHYDROGENASE [LDH]: ICD-10-CM

## 2018-07-05 DIAGNOSIS — E87.3 ALKALOSIS: ICD-10-CM

## 2018-07-05 DIAGNOSIS — N18.3 CHRONIC KIDNEY DISEASE, STAGE 3 (MODERATE): ICD-10-CM

## 2018-07-05 DIAGNOSIS — F41.9 ANXIETY DISORDER, UNSPECIFIED: ICD-10-CM

## 2018-07-05 DIAGNOSIS — E78.5 HYPERLIPIDEMIA, UNSPECIFIED: ICD-10-CM

## 2018-07-05 DIAGNOSIS — E87.6 HYPOKALEMIA: ICD-10-CM

## 2018-07-05 DIAGNOSIS — B35.3 TINEA PEDIS: ICD-10-CM

## 2018-07-05 DIAGNOSIS — E43 UNSPECIFIED SEVERE PROTEIN-CALORIE MALNUTRITION: ICD-10-CM

## 2018-07-05 DIAGNOSIS — I24.8 OTHER FORMS OF ACUTE ISCHEMIC HEART DISEASE: ICD-10-CM

## 2018-07-05 DIAGNOSIS — M81.0 AGE-RELATED OSTEOPOROSIS WITHOUT CURRENT PATHOLOGICAL FRACTURE: ICD-10-CM

## 2018-07-05 DIAGNOSIS — Z91.81 HISTORY OF FALLING: ICD-10-CM

## 2018-07-05 DIAGNOSIS — I45.81 LONG QT SYNDROME: ICD-10-CM

## 2018-07-05 DIAGNOSIS — M62.82 RHABDOMYOLYSIS: ICD-10-CM

## 2018-07-05 DIAGNOSIS — H10.9 UNSPECIFIED CONJUNCTIVITIS: ICD-10-CM

## 2018-07-05 DIAGNOSIS — I13.0 HYPERTENSIVE HEART AND CHRONIC KIDNEY DISEASE WITH HEART FAILURE AND STAGE 1 THROUGH STAGE 4 CHRONIC KIDNEY DISEASE, OR UNSPECIFIED CHRONIC KIDNEY DISEASE: ICD-10-CM

## 2018-07-05 DIAGNOSIS — I25.10 ATHEROSCLEROTIC HEART DISEASE OF NATIVE CORONARY ARTERY WITHOUT ANGINA PECTORIS: ICD-10-CM

## 2018-07-05 DIAGNOSIS — Z96.643 PRESENCE OF ARTIFICIAL HIP JOINT, BILATERAL: ICD-10-CM

## 2018-07-05 DIAGNOSIS — I50.32 CHRONIC DIASTOLIC (CONGESTIVE) HEART FAILURE: ICD-10-CM

## 2018-07-05 DIAGNOSIS — I87.8 OTHER SPECIFIED DISORDERS OF VEINS: ICD-10-CM

## 2020-02-26 NOTE — PATIENT PROFILE ADULT. - DIETITIAN.
Implemented All Universal Safety Interventions:  [place] to call system. Call bell, personal items and telephone within reach. Instruct patient to call for assistance. Room bathroom lighting operational. Non-slip footwear when patient is off stretcher. Physically safe environment: no spills, clutter or unnecessary equipment. Stretcher in lowest position, wheels locked, appropriate side rails in place.
dietitian/nutrition services

## 2020-04-16 ENCOUNTER — INPATIENT (INPATIENT)
Facility: HOSPITAL | Age: 85
LOS: 7 days | Discharge: NURSING, REHAB OR LONG-TERM CARE | DRG: 871 | End: 2020-04-24
Attending: INTERNAL MEDICINE | Admitting: INTERNAL MEDICINE
Payer: MEDICARE

## 2020-04-16 VITALS
DIASTOLIC BLOOD PRESSURE: 63 MMHG | HEART RATE: 84 BPM | SYSTOLIC BLOOD PRESSURE: 101 MMHG | WEIGHT: 99.21 LBS | OXYGEN SATURATION: 99 % | RESPIRATION RATE: 14 BRPM | TEMPERATURE: 97 F

## 2020-04-16 DIAGNOSIS — U07.1 COVID-19: ICD-10-CM

## 2020-04-16 DIAGNOSIS — E87.2 ACIDOSIS: ICD-10-CM

## 2020-04-16 DIAGNOSIS — G93.40 ENCEPHALOPATHY, UNSPECIFIED: ICD-10-CM

## 2020-04-16 DIAGNOSIS — N17.9 ACUTE KIDNEY FAILURE, UNSPECIFIED: ICD-10-CM

## 2020-04-16 DIAGNOSIS — I10 ESSENTIAL (PRIMARY) HYPERTENSION: ICD-10-CM

## 2020-04-16 DIAGNOSIS — F41.9 ANXIETY DISORDER, UNSPECIFIED: ICD-10-CM

## 2020-04-16 DIAGNOSIS — A41.9 SEPSIS, UNSPECIFIED ORGANISM: ICD-10-CM

## 2020-04-16 DIAGNOSIS — Z96.643 PRESENCE OF ARTIFICIAL HIP JOINT, BILATERAL: Chronic | ICD-10-CM

## 2020-04-16 DIAGNOSIS — E87.0 HYPEROSMOLALITY AND HYPERNATREMIA: ICD-10-CM

## 2020-04-16 DIAGNOSIS — D69.6 THROMBOCYTOPENIA, UNSPECIFIED: ICD-10-CM

## 2020-04-16 DIAGNOSIS — R79.89 OTHER SPECIFIED ABNORMAL FINDINGS OF BLOOD CHEMISTRY: ICD-10-CM

## 2020-04-16 DIAGNOSIS — Z29.9 ENCOUNTER FOR PROPHYLACTIC MEASURES, UNSPECIFIED: ICD-10-CM

## 2020-04-16 PROBLEM — N39.0 URINARY TRACT INFECTION, SITE NOT SPECIFIED: Chronic | Status: ACTIVE | Noted: 2018-06-27

## 2020-04-16 PROBLEM — I25.10 ATHEROSCLEROTIC HEART DISEASE OF NATIVE CORONARY ARTERY WITHOUT ANGINA PECTORIS: Chronic | Status: ACTIVE | Noted: 2018-06-27

## 2020-04-16 PROBLEM — R55 SYNCOPE AND COLLAPSE: Chronic | Status: ACTIVE | Noted: 2018-06-27

## 2020-04-16 LAB
ALBUMIN SERPL ELPH-MCNC: 2.3 G/DL — LOW (ref 3.3–5)
ALBUMIN SERPL ELPH-MCNC: 2.4 G/DL — LOW (ref 3.3–5)
ALP SERPL-CCNC: 60 U/L — SIGNIFICANT CHANGE UP (ref 40–120)
ALP SERPL-CCNC: 62 U/L — SIGNIFICANT CHANGE UP (ref 40–120)
ALT FLD-CCNC: 90 U/L — HIGH (ref 10–45)
ALT FLD-CCNC: SIGNIFICANT CHANGE UP U/L (ref 10–45)
ANION GAP SERPL CALC-SCNC: 10 MMOL/L — SIGNIFICANT CHANGE UP (ref 5–17)
ANION GAP SERPL CALC-SCNC: 12 MMOL/L — SIGNIFICANT CHANGE UP (ref 5–17)
ANION GAP SERPL CALC-SCNC: 12 MMOL/L — SIGNIFICANT CHANGE UP (ref 5–17)
ANION GAP SERPL CALC-SCNC: 13 MMOL/L — SIGNIFICANT CHANGE UP (ref 5–17)
APPEARANCE UR: CLEAR — SIGNIFICANT CHANGE UP
APTT BLD: 20.8 SEC — LOW (ref 27.5–36.3)
AST SERPL-CCNC: 87 U/L — HIGH (ref 10–40)
AST SERPL-CCNC: SIGNIFICANT CHANGE UP U/L (ref 10–40)
BACTERIA # UR AUTO: ABNORMAL /HPF
BASOPHILS # BLD AUTO: 0.02 K/UL — SIGNIFICANT CHANGE UP (ref 0–0.2)
BASOPHILS # BLD AUTO: 0.03 K/UL — SIGNIFICANT CHANGE UP (ref 0–0.2)
BASOPHILS NFR BLD AUTO: 0.1 % — SIGNIFICANT CHANGE UP (ref 0–2)
BASOPHILS NFR BLD AUTO: 0.2 % — SIGNIFICANT CHANGE UP (ref 0–2)
BILIRUB SERPL-MCNC: 0.6 MG/DL — SIGNIFICANT CHANGE UP (ref 0.2–1.2)
BILIRUB SERPL-MCNC: 0.6 MG/DL — SIGNIFICANT CHANGE UP (ref 0.2–1.2)
BILIRUB UR-MCNC: NEGATIVE — SIGNIFICANT CHANGE UP
BUN SERPL-MCNC: 69 MG/DL — HIGH (ref 7–23)
BUN SERPL-MCNC: 71 MG/DL — HIGH (ref 7–23)
BUN SERPL-MCNC: 86 MG/DL — HIGH (ref 7–23)
BUN SERPL-MCNC: 87 MG/DL — HIGH (ref 7–23)
CALCIUM SERPL-MCNC: 7.8 MG/DL — LOW (ref 8.4–10.5)
CALCIUM SERPL-MCNC: 7.8 MG/DL — LOW (ref 8.4–10.5)
CALCIUM SERPL-MCNC: 8.4 MG/DL — SIGNIFICANT CHANGE UP (ref 8.4–10.5)
CALCIUM SERPL-MCNC: 8.6 MG/DL — SIGNIFICANT CHANGE UP (ref 8.4–10.5)
CHLORIDE SERPL-SCNC: 120 MMOL/L — HIGH (ref 96–108)
CHLORIDE SERPL-SCNC: 128 MMOL/L — HIGH (ref 96–108)
CHOLEST SERPL-MCNC: 91 MG/DL — SIGNIFICANT CHANGE UP (ref 10–199)
CK MB CFR SERPL CALC: 1.7 NG/ML — SIGNIFICANT CHANGE UP (ref 0–6.7)
CK SERPL-CCNC: 365 U/L — HIGH (ref 25–170)
CO2 SERPL-SCNC: 24 MMOL/L — SIGNIFICANT CHANGE UP (ref 22–31)
CO2 SERPL-SCNC: 25 MMOL/L — SIGNIFICANT CHANGE UP (ref 22–31)
CO2 SERPL-SCNC: 26 MMOL/L — SIGNIFICANT CHANGE UP (ref 22–31)
CO2 SERPL-SCNC: 26 MMOL/L — SIGNIFICANT CHANGE UP (ref 22–31)
COLOR SPEC: YELLOW — SIGNIFICANT CHANGE UP
CREAT SERPL-MCNC: 1.12 MG/DL — SIGNIFICANT CHANGE UP (ref 0.5–1.3)
CREAT SERPL-MCNC: 1.13 MG/DL — SIGNIFICANT CHANGE UP (ref 0.5–1.3)
CREAT SERPL-MCNC: 1.45 MG/DL — HIGH (ref 0.5–1.3)
CREAT SERPL-MCNC: 1.61 MG/DL — HIGH (ref 0.5–1.3)
CRP SERPL-MCNC: 24.61 MG/DL — HIGH (ref 0–0.4)
CRP SERPL-MCNC: 25.72 MG/DL — HIGH (ref 0–0.4)
D DIMER BLD IA.RAPID-MCNC: 3667 NG/ML DDU — HIGH
D DIMER BLD IA.RAPID-MCNC: 5643 NG/ML DDU — HIGH
DIFF PNL FLD: ABNORMAL
EOSINOPHIL # BLD AUTO: 0 K/UL — SIGNIFICANT CHANGE UP (ref 0–0.5)
EOSINOPHIL # BLD AUTO: 0 K/UL — SIGNIFICANT CHANGE UP (ref 0–0.5)
EOSINOPHIL NFR BLD AUTO: 0 % — SIGNIFICANT CHANGE UP (ref 0–6)
EOSINOPHIL NFR BLD AUTO: 0 % — SIGNIFICANT CHANGE UP (ref 0–6)
EPI CELLS # UR: SIGNIFICANT CHANGE UP /HPF (ref 0–5)
ERYTHROCYTE [SEDIMENTATION RATE] IN BLOOD: 110 MM/HR — HIGH
FERRITIN SERPL-MCNC: 2744 NG/ML — HIGH (ref 15–150)
FERRITIN SERPL-MCNC: 3048 NG/ML — HIGH (ref 15–150)
GAS PNL BLDV: SIGNIFICANT CHANGE UP
GLUCOSE BLDC GLUCOMTR-MCNC: 179 MG/DL — HIGH (ref 70–99)
GLUCOSE BLDC GLUCOMTR-MCNC: 185 MG/DL — HIGH (ref 70–99)
GLUCOSE BLDC GLUCOMTR-MCNC: 207 MG/DL — HIGH (ref 70–99)
GLUCOSE SERPL-MCNC: 131 MG/DL — HIGH (ref 70–99)
GLUCOSE SERPL-MCNC: 131 MG/DL — HIGH (ref 70–99)
GLUCOSE SERPL-MCNC: 163 MG/DL — HIGH (ref 70–99)
GLUCOSE SERPL-MCNC: 209 MG/DL — HIGH (ref 70–99)
GLUCOSE UR QL: NEGATIVE — SIGNIFICANT CHANGE UP
HCT VFR BLD CALC: 43.6 % — SIGNIFICANT CHANGE UP (ref 34.5–45)
HCT VFR BLD CALC: 46.4 % — HIGH (ref 34.5–45)
HDLC SERPL-MCNC: 17 MG/DL — LOW
HGB BLD-MCNC: 13.9 G/DL — SIGNIFICANT CHANGE UP (ref 11.5–15.5)
HGB BLD-MCNC: 14 G/DL — SIGNIFICANT CHANGE UP (ref 11.5–15.5)
IMM GRANULOCYTES NFR BLD AUTO: 1 % — SIGNIFICANT CHANGE UP (ref 0–1.5)
IMM GRANULOCYTES NFR BLD AUTO: 1.5 % — SIGNIFICANT CHANGE UP (ref 0–1.5)
INR BLD: 1.21 — HIGH (ref 0.88–1.16)
KETONES UR-MCNC: ABNORMAL MG/DL
LACTATE SERPL-SCNC: 1.2 MMOL/L — SIGNIFICANT CHANGE UP (ref 0.5–2)
LACTATE SERPL-SCNC: 2.9 MMOL/L — HIGH (ref 0.5–2)
LEUKOCYTE ESTERASE UR-ACNC: ABNORMAL
LIPID PNL WITH DIRECT LDL SERPL: 42 MG/DL — SIGNIFICANT CHANGE UP
LYMPHOCYTES # BLD AUTO: 11.8 % — LOW (ref 13–44)
LYMPHOCYTES # BLD AUTO: 14.7 % — SIGNIFICANT CHANGE UP (ref 13–44)
LYMPHOCYTES # BLD AUTO: 2.33 K/UL — SIGNIFICANT CHANGE UP (ref 1–3.3)
LYMPHOCYTES # BLD AUTO: 3.1 K/UL — SIGNIFICANT CHANGE UP (ref 1–3.3)
MAGNESIUM SERPL-MCNC: 2.6 MG/DL — SIGNIFICANT CHANGE UP (ref 1.6–2.6)
MCHC RBC-ENTMCNC: 29.6 PG — SIGNIFICANT CHANGE UP (ref 27–34)
MCHC RBC-ENTMCNC: 30 GM/DL — LOW (ref 32–36)
MCHC RBC-ENTMCNC: 31.5 PG — SIGNIFICANT CHANGE UP (ref 27–34)
MCHC RBC-ENTMCNC: 32.1 GM/DL — SIGNIFICANT CHANGE UP (ref 32–36)
MCV RBC AUTO: 98 FL — SIGNIFICANT CHANGE UP (ref 80–100)
MCV RBC AUTO: 98.9 FL — SIGNIFICANT CHANGE UP (ref 80–100)
MONOCYTES # BLD AUTO: 0.75 K/UL — SIGNIFICANT CHANGE UP (ref 0–0.9)
MONOCYTES # BLD AUTO: 1.1 K/UL — HIGH (ref 0–0.9)
MONOCYTES NFR BLD AUTO: 3.8 % — SIGNIFICANT CHANGE UP (ref 2–14)
MONOCYTES NFR BLD AUTO: 5.2 % — SIGNIFICANT CHANGE UP (ref 2–14)
NEUTROPHILS # BLD AUTO: 16.49 K/UL — HIGH (ref 1.8–7.4)
NEUTROPHILS # BLD AUTO: 16.58 K/UL — HIGH (ref 1.8–7.4)
NEUTROPHILS NFR BLD AUTO: 78.5 % — HIGH (ref 43–77)
NEUTROPHILS NFR BLD AUTO: 83.2 % — HIGH (ref 43–77)
NITRITE UR-MCNC: POSITIVE
NRBC # BLD: 0 /100 WBCS — SIGNIFICANT CHANGE UP (ref 0–0)
NRBC # BLD: 0 /100 WBCS — SIGNIFICANT CHANGE UP (ref 0–0)
PH UR: 6 — SIGNIFICANT CHANGE UP (ref 5–8)
PHOSPHATE SERPL-MCNC: 3.8 MG/DL — SIGNIFICANT CHANGE UP (ref 2.5–4.5)
PLATELET # BLD AUTO: 39 K/UL — LOW (ref 150–400)
PLATELET # BLD AUTO: 71 K/UL — LOW (ref 150–400)
POTASSIUM SERPL-MCNC: 3.7 MMOL/L — SIGNIFICANT CHANGE UP (ref 3.5–5.3)
POTASSIUM SERPL-MCNC: 3.8 MMOL/L — SIGNIFICANT CHANGE UP (ref 3.5–5.3)
POTASSIUM SERPL-MCNC: 3.8 MMOL/L — SIGNIFICANT CHANGE UP (ref 3.5–5.3)
POTASSIUM SERPL-MCNC: SIGNIFICANT CHANGE UP MMOL/L (ref 3.5–5.3)
POTASSIUM SERPL-MCNC: SIGNIFICANT CHANGE UP MMOL/L (ref 3.5–5.3)
POTASSIUM SERPL-SCNC: 3.7 MMOL/L — SIGNIFICANT CHANGE UP (ref 3.5–5.3)
POTASSIUM SERPL-SCNC: 3.8 MMOL/L — SIGNIFICANT CHANGE UP (ref 3.5–5.3)
POTASSIUM SERPL-SCNC: 3.8 MMOL/L — SIGNIFICANT CHANGE UP (ref 3.5–5.3)
POTASSIUM SERPL-SCNC: SIGNIFICANT CHANGE UP MMOL/L (ref 3.5–5.3)
POTASSIUM SERPL-SCNC: SIGNIFICANT CHANGE UP MMOL/L (ref 3.5–5.3)
PROCALCITONIN SERPL-MCNC: 0.79 NG/ML — HIGH (ref 0.02–0.1)
PROT SERPL-MCNC: 6.9 G/DL — SIGNIFICANT CHANGE UP (ref 6–8.3)
PROT SERPL-MCNC: 7.3 G/DL — SIGNIFICANT CHANGE UP (ref 6–8.3)
PROT UR-MCNC: 30 MG/DL
PROTHROM AB SERPL-ACNC: 13.9 SEC — HIGH (ref 10–12.9)
RBC # BLD: 4.45 M/UL — SIGNIFICANT CHANGE UP (ref 3.8–5.2)
RBC # BLD: 4.69 M/UL — SIGNIFICANT CHANGE UP (ref 3.8–5.2)
RBC # FLD: 14.5 % — SIGNIFICANT CHANGE UP (ref 10.3–14.5)
RBC # FLD: 14.5 % — SIGNIFICANT CHANGE UP (ref 10.3–14.5)
RBC CASTS # UR COMP ASSIST: < 5 /HPF — SIGNIFICANT CHANGE UP
SARS-COV-2 RNA SPEC QL NAA+PROBE: DETECTED
SODIUM SERPL-SCNC: 157 MMOL/L — HIGH (ref 135–145)
SODIUM SERPL-SCNC: 158 MMOL/L — HIGH (ref 135–145)
SODIUM SERPL-SCNC: 159 MMOL/L — HIGH (ref 135–145)
SODIUM SERPL-SCNC: 162 MMOL/L — CRITICAL HIGH (ref 135–145)
SP GR SPEC: 1.02 — SIGNIFICANT CHANGE UP (ref 1–1.03)
TOTAL CHOLESTEROL/HDL RATIO MEASUREMENT: 5.4 RATIO — SIGNIFICANT CHANGE UP (ref 3.3–7.1)
TRIGL SERPL-MCNC: 159 MG/DL — HIGH (ref 10–149)
TROPONIN T SERPL-MCNC: 0.03 NG/ML — HIGH (ref 0–0.01)
TROPONIN T SERPL-MCNC: 0.04 NG/ML — CRITICAL HIGH (ref 0–0.01)
UROBILINOGEN FLD QL: 0.2 E.U./DL — SIGNIFICANT CHANGE UP
WBC # BLD: 19.8 K/UL — HIGH (ref 3.8–10.5)
WBC # BLD: 21.11 K/UL — HIGH (ref 3.8–10.5)
WBC # FLD AUTO: 19.8 K/UL — HIGH (ref 3.8–10.5)
WBC # FLD AUTO: 21.11 K/UL — HIGH (ref 3.8–10.5)
WBC UR QL: ABNORMAL /HPF

## 2020-04-16 PROCEDURE — 99285 EMERGENCY DEPT VISIT HI MDM: CPT

## 2020-04-16 PROCEDURE — 99223 1ST HOSP IP/OBS HIGH 75: CPT | Mod: CS,GC

## 2020-04-16 PROCEDURE — 70450 CT HEAD/BRAIN W/O DYE: CPT | Mod: 26

## 2020-04-16 PROCEDURE — 99232 SBSQ HOSP IP/OBS MODERATE 35: CPT | Mod: CS

## 2020-04-16 PROCEDURE — 71045 X-RAY EXAM CHEST 1 VIEW: CPT | Mod: 26

## 2020-04-16 PROCEDURE — 93010 ELECTROCARDIOGRAM REPORT: CPT

## 2020-04-16 RX ORDER — MIRTAZAPINE 45 MG/1
1 TABLET, ORALLY DISINTEGRATING ORAL
Qty: 0 | Refills: 0 | DISCHARGE

## 2020-04-16 RX ORDER — SOD,AMMONIUM,POTASSIUM LACTATE
1 CREAM (GRAM) TOPICAL
Qty: 0 | Refills: 0 | DISCHARGE

## 2020-04-16 RX ORDER — SOD,AMMONIUM,POTASSIUM LACTATE
1 CREAM (GRAM) TOPICAL
Refills: 0 | Status: DISCONTINUED | OUTPATIENT
Start: 2020-04-16 | End: 2020-04-24

## 2020-04-16 RX ORDER — DEXTROSE 50 % IN WATER 50 %
25 SYRINGE (ML) INTRAVENOUS ONCE
Refills: 0 | Status: DISCONTINUED | OUTPATIENT
Start: 2020-04-16 | End: 2020-04-24

## 2020-04-16 RX ORDER — GLUCAGON INJECTION, SOLUTION 0.5 MG/.1ML
1 INJECTION, SOLUTION SUBCUTANEOUS ONCE
Refills: 0 | Status: DISCONTINUED | OUTPATIENT
Start: 2020-04-16 | End: 2020-04-24

## 2020-04-16 RX ORDER — HYDROXYCHLOROQUINE SULFATE 200 MG
800 TABLET ORAL EVERY 24 HOURS
Refills: 0 | Status: DISCONTINUED | OUTPATIENT
Start: 2020-04-16 | End: 2020-04-16

## 2020-04-16 RX ORDER — SODIUM CHLORIDE 9 MG/ML
1000 INJECTION, SOLUTION INTRAVENOUS
Refills: 0 | Status: DISCONTINUED | OUTPATIENT
Start: 2020-04-16 | End: 2020-04-24

## 2020-04-16 RX ORDER — AZITHROMYCIN 500 MG/1
250 TABLET, FILM COATED ORAL EVERY 24 HOURS
Refills: 0 | Status: DISCONTINUED | OUTPATIENT
Start: 2020-04-16 | End: 2020-04-16

## 2020-04-16 RX ORDER — SODIUM CHLORIDE 9 MG/ML
1000 INJECTION, SOLUTION INTRAVENOUS
Refills: 0 | Status: DISCONTINUED | OUTPATIENT
Start: 2020-04-16 | End: 2020-04-16

## 2020-04-16 RX ORDER — FAMOTIDINE 10 MG/ML
20 INJECTION INTRAVENOUS
Refills: 0 | Status: DISCONTINUED | OUTPATIENT
Start: 2020-04-16 | End: 2020-04-16

## 2020-04-16 RX ORDER — ENOXAPARIN SODIUM 100 MG/ML
45 INJECTION SUBCUTANEOUS EVERY 24 HOURS
Refills: 0 | Status: DISCONTINUED | OUTPATIENT
Start: 2020-04-16 | End: 2020-04-16

## 2020-04-16 RX ORDER — SENNA PLUS 8.6 MG/1
2 TABLET ORAL AT BEDTIME
Refills: 0 | Status: DISCONTINUED | OUTPATIENT
Start: 2020-04-16 | End: 2020-04-16

## 2020-04-16 RX ORDER — DEXTROSE 50 % IN WATER 50 %
15 SYRINGE (ML) INTRAVENOUS ONCE
Refills: 0 | Status: DISCONTINUED | OUTPATIENT
Start: 2020-04-16 | End: 2020-04-24

## 2020-04-16 RX ORDER — POTASSIUM CHLORIDE 20 MEQ
40 PACKET (EA) ORAL ONCE
Refills: 0 | Status: COMPLETED | OUTPATIENT
Start: 2020-04-16 | End: 2020-04-16

## 2020-04-16 RX ORDER — FAMOTIDINE 10 MG/ML
20 INJECTION INTRAVENOUS
Refills: 0 | Status: DISCONTINUED | OUTPATIENT
Start: 2020-04-16 | End: 2020-04-17

## 2020-04-16 RX ORDER — SODIUM CHLORIDE 9 MG/ML
500 INJECTION INTRAMUSCULAR; INTRAVENOUS; SUBCUTANEOUS ONCE
Refills: 0 | Status: COMPLETED | OUTPATIENT
Start: 2020-04-16 | End: 2020-04-16

## 2020-04-16 RX ORDER — POTASSIUM CHLORIDE 20 MEQ
40 PACKET (EA) ORAL ONCE
Refills: 0 | Status: DISCONTINUED | OUTPATIENT
Start: 2020-04-16 | End: 2020-04-16

## 2020-04-16 RX ORDER — SODIUM CHLORIDE 9 MG/ML
1000 INJECTION, SOLUTION INTRAVENOUS
Refills: 0 | Status: DISCONTINUED | OUTPATIENT
Start: 2020-04-16 | End: 2020-04-17

## 2020-04-16 RX ORDER — CEFTRIAXONE 500 MG/1
1000 INJECTION, POWDER, FOR SOLUTION INTRAMUSCULAR; INTRAVENOUS EVERY 24 HOURS
Refills: 0 | Status: DISCONTINUED | OUTPATIENT
Start: 2020-04-16 | End: 2020-04-18

## 2020-04-16 RX ORDER — RALOXIFENE HYDROCHLORIDE 60 MG/1
1 TABLET, COATED ORAL
Qty: 0 | Refills: 0 | DISCHARGE

## 2020-04-16 RX ORDER — ENOXAPARIN SODIUM 100 MG/ML
30 INJECTION SUBCUTANEOUS EVERY 24 HOURS
Refills: 0 | Status: DISCONTINUED | OUTPATIENT
Start: 2020-04-16 | End: 2020-04-16

## 2020-04-16 RX ORDER — SODIUM CHLORIDE 9 MG/ML
1000 INJECTION INTRAMUSCULAR; INTRAVENOUS; SUBCUTANEOUS
Refills: 0 | Status: DISCONTINUED | OUTPATIENT
Start: 2020-04-16 | End: 2020-04-16

## 2020-04-16 RX ORDER — SODIUM CHLORIDE 9 MG/ML
1000 INJECTION, SOLUTION INTRAVENOUS
Refills: 0 | Status: COMPLETED | OUTPATIENT
Start: 2020-04-16 | End: 2020-04-16

## 2020-04-16 RX ORDER — DEXTROSE 50 % IN WATER 50 %
12.5 SYRINGE (ML) INTRAVENOUS ONCE
Refills: 0 | Status: DISCONTINUED | OUTPATIENT
Start: 2020-04-16 | End: 2020-04-24

## 2020-04-16 RX ORDER — HYDROXYCHLOROQUINE SULFATE 200 MG
TABLET ORAL
Refills: 0 | Status: DISCONTINUED | OUTPATIENT
Start: 2020-04-16 | End: 2020-04-16

## 2020-04-16 RX ORDER — MIRTAZAPINE 45 MG/1
15 TABLET, ORALLY DISINTEGRATING ORAL AT BEDTIME
Refills: 0 | Status: DISCONTINUED | OUTPATIENT
Start: 2020-04-16 | End: 2020-04-16

## 2020-04-16 RX ORDER — SENNA PLUS 8.6 MG/1
1 TABLET ORAL
Qty: 0 | Refills: 0 | DISCHARGE

## 2020-04-16 RX ORDER — HYDROCORTISONE 20 MG
40 TABLET ORAL
Refills: 0 | Status: DISCONTINUED | OUTPATIENT
Start: 2020-04-16 | End: 2020-04-16

## 2020-04-16 RX ORDER — INSULIN LISPRO 100/ML
VIAL (ML) SUBCUTANEOUS
Refills: 0 | Status: DISCONTINUED | OUTPATIENT
Start: 2020-04-16 | End: 2020-04-24

## 2020-04-16 RX ORDER — FUROSEMIDE 40 MG
1 TABLET ORAL
Qty: 0 | Refills: 0 | DISCHARGE

## 2020-04-16 RX ORDER — POTASSIUM CHLORIDE 20 MEQ
1 PACKET (EA) ORAL
Qty: 0 | Refills: 0 | DISCHARGE

## 2020-04-16 RX ORDER — AZITHROMYCIN 500 MG/1
250 TABLET, FILM COATED ORAL EVERY 24 HOURS
Refills: 0 | Status: DISCONTINUED | OUTPATIENT
Start: 2020-04-16 | End: 2020-04-17

## 2020-04-16 RX ADMIN — SODIUM CHLORIDE 500 MILLILITER(S): 9 INJECTION, SOLUTION INTRAVENOUS at 10:03

## 2020-04-16 RX ADMIN — Medication 1: at 21:42

## 2020-04-16 RX ADMIN — Medication 1 APPLICATION(S): at 17:39

## 2020-04-16 RX ADMIN — Medication 40 MILLIGRAM(S): at 11:23

## 2020-04-16 RX ADMIN — Medication 1: at 17:38

## 2020-04-16 RX ADMIN — SODIUM CHLORIDE 175 MILLILITER(S): 9 INJECTION INTRAMUSCULAR; INTRAVENOUS; SUBCUTANEOUS at 03:00

## 2020-04-16 RX ADMIN — SODIUM CHLORIDE 60 MILLILITER(S): 9 INJECTION, SOLUTION INTRAVENOUS at 04:20

## 2020-04-16 RX ADMIN — Medication 2: at 12:48

## 2020-04-16 RX ADMIN — Medication 1 DROP(S): at 17:39

## 2020-04-16 RX ADMIN — SODIUM CHLORIDE 100 MILLILITER(S): 9 INJECTION, SOLUTION INTRAVENOUS at 11:24

## 2020-04-16 RX ADMIN — SODIUM CHLORIDE 500 MILLILITER(S): 9 INJECTION INTRAMUSCULAR; INTRAVENOUS; SUBCUTANEOUS at 02:41

## 2020-04-16 RX ADMIN — AZITHROMYCIN 250 MILLIGRAM(S): 500 TABLET, FILM COATED ORAL at 12:23

## 2020-04-16 RX ADMIN — Medication 40 MILLIGRAM(S): at 22:59

## 2020-04-16 RX ADMIN — CEFTRIAXONE 100 MILLIGRAM(S): 500 INJECTION, POWDER, FOR SOLUTION INTRAMUSCULAR; INTRAVENOUS at 13:51

## 2020-04-16 RX ADMIN — FAMOTIDINE 100 MILLIGRAM(S): 10 INJECTION INTRAVENOUS at 11:22

## 2020-04-16 NOTE — ED PROVIDER NOTE - MUSCULOSKELETAL, MLM
Spine appears normal, range of motion is not limited, no muscle or joint tenderness, moving all extremities, pulses intact.

## 2020-04-16 NOTE — H&P ADULT - PROBLEM SELECTOR PLAN 8
F: D50 @ 60cc/hr  E: K >4, Mag >2  N:   GI:  DVT ppx F: D5 @ 60cc/hr  E: K >4, Mag >2  N: regular diet, pending speech/swallow consult  GI: Famotidine  DVT ppx: Lovenox  Code Status: DNR/DNI, discuss with HCP in AM for possible comfort care soft BPs, currently 108/65  -Hold home Atenolol 25mg

## 2020-04-16 NOTE — PROGRESS NOTE ADULT - PROBLEM SELECTOR PLAN 3
Na 159 on admission, s/p NS IVF 500cc bolus in ED  -repeat BMP revealed Na 162  -f/u urine lytes  -Start D5W at 60cc/hr AOx0, nonverbal, baseline per friend a week ago, lucid and eating/drinking. May be 2/2 COVID and hypernatremia, baseline mental function unclear, obtain collateral.  -s/p 500cc NS bolus   -f/u UA AOx0, nonverbal, baseline per friend and niece (Radha) a week ago, lucid and eating/drinking. May be 2/2 COVID and hypernatremia, baseline mental function unclear, obtain collateral.  -s/p 500cc NS bolus   -U/A positive; will treat with IV CTX 1g q24h  -f/u urine culture

## 2020-04-16 NOTE — CHART NOTE - NSCHARTNOTEFT_GEN_A_CORE
Admitting Diagnosis:   Patient is a 89y old  Female who presents with a chief complaint of COVID (16 Apr 2020 09:27)      Consult: Yes [ x  ]  No [   ]    Reason for Initial Nutrition Assessment:NPO      PAST MEDICAL & SURGICAL HISTORY:  Chronic kidney disease (CKD)  HLD (hyperlipidemia)  Diastolic CHF  UTI (urinary tract infection)  Syncope and collapse  Hypertension  ASHD (arteriosclerotic heart disease)  Anxiety  H/O bilateral hip replacements      Current Nutrition Order:    PO Intake: Excellent (%) [   ]  Good (50-75%) [   ]  Fair (25-50%) [   ]  Poor (<25%) [   ]NPO nonverbal/SLP unable to evaluate due to mental state    GI Issues: none noted    Pain: unable to assess per RN,No pain    Skin Integrity:intact.No PU.Ecchymosis Fitz 8    Labs:   04-16    162<HH>  |  128<H>  |  87<H>  ----------------------------<  131<H>  3.8   |  24  |  1.45<H>    Ca    8.4      16 Apr 2020 04:24  Phos  3.8     04-16  Mg     2.6     04-16    TPro  6.9  /  Alb  2.3<L>  /  TBili  0.6  /  DBili  x   /  AST  87<H>  /  ALT  90<H>  /  AlkPhos  62  04-16    CAPILLARY BLOOD GLUCOSE        Nutritionally Pertinent Lab Values:    Medications:  MEDICATIONS  (STANDING):  ammonium lactate 12% Lotion 1 Application(s) Topical two times a day  artificial  tears Solution 1 Drop(s) Both EYES two times a day  dextrose 5%. 1000 milliLiter(s) (100 mL/Hr) IV Continuous <Continuous>  dextrose 5%. 1000 milliLiter(s) (50 mL/Hr) IV Continuous <Continuous>  dextrose 50% Injectable 12.5 Gram(s) IV Push once  dextrose 50% Injectable 25 Gram(s) IV Push once  dextrose 50% Injectable 25 Gram(s) IV Push once  famotidine  IVPB 20 milliGRAM(s) IV Intermittent every 48 hours  insulin lispro (HumaLOG) corrective regimen sliding scale   SubCutaneous Before meals and at bedtime  methylPREDNISolone sodium succinate Injectable 40 milliGRAM(s) IV Push every 12 hours    MEDICATIONS  (PRN):  dextrose 40% Gel 15 Gram(s) Oral once PRN Blood Glucose LESS THAN 70 milliGRAM(s)/deciliter  glucagon  Injectable 1 milliGRAM(s) IntraMuscular once PRN Glucose LESS THAN 70 milligrams/deciliter      Admitted Anthropometrics: Height from June 2018 admission:4ft 9inches,Weight was 44.1kg Present weight:45kg.Patient is105%ofIBW,BMI:21.4    Weight:99lbs  Daily     Daily     Weight Change: 2lb weight gain since June2018 recorded weights    Nutrition Focused Physical Exam: Completed [   ]  Unable to complete [ x  ]Due to Isolation    Estimated energy needs: Based on ABW due to between % of IBW.ABW:45kg u80-80zotr:1125-1350kcal and 1.2-1.4gmprotein:54-63gmprotein.Fluids per team    Subjective: 89y/o nonverbal female with history of HTN,HLD,CHF, Anxiety D/O admitted from NH with Encephalopathy ,Hypernatremia / RETA and COVID-19+.Noted DNR/DNI with wishes for no TF. Per covering MD ,patient will not be placed on comfort care or feeds yet(per HCP wishes). SLP attempted to see patient but due to unresponsiveness unable to complete at this time .Plans to return tomorrow to re-attempt .As per SW, possible D/C back to NH tomorrow. Will need to establish comfort feeds decision if unable to advance via po.   Nutrition Diagnosis :Inadequate oral intake r/t inability to meet EER 2/2 mental status AEB: NPO    [  ] No active nutrition diagnosis at this time  [  ] Current medical condition precludes nutrition intervention    Goal:Align nutrition with goals of care at all times     Recommendations:1.SLP evaluation and align with Goals of care    Education: not indicated    Risk Level: High [ x  ] Moderate [   ] Low [   ]

## 2020-04-16 NOTE — H&P ADULT - PROBLEM SELECTOR PLAN 3
BUN/Cr 86/1.61 on admission  -f/u UA and urine lytes  -Continue to monitor and avoid nephrotoxic agents d-dimer 3667 on admission  - d-dimer 3667 on admission  -Start Lovenox 45mg SQ QD and monitor for any signs of bleeding as pts PLT 71 Na 159 on admission, s/p NS IVF 500cc bolus in ED  -repeat BMP revealed Na 162  -f/u urine lytes  -Start D5W at 60cc/hr

## 2020-04-16 NOTE — H&P ADULT - NSICDXPASTMEDICALHX_GEN_ALL_CORE_FT
PAST MEDICAL HISTORY:  Anxiety     ASHD (arteriosclerotic heart disease)     Chronic kidney disease (CKD)     Diastolic CHF     HLD (hyperlipidemia)     Hypertension     Syncope and collapse     UTI (urinary tract infection)

## 2020-04-16 NOTE — PROGRESS NOTE ADULT - SUBJECTIVE AND OBJECTIVE BOX
OVERNIGHT EVENTS: NAEO    SUBJECTIVE / INTERVAL HPI: Patient seen and examined at bedside. Unable to obtain history because pt nonverbal, not answering questions.     VITAL SIGNS:  Vital Signs Last 24 Hrs  T(C): 35.6 (16 Apr 2020 06:42), Max: 36.3 (16 Apr 2020 00:01)  T(F): 96 (16 Apr 2020 06:42), Max: 97.4 (16 Apr 2020 00:01)  HR: 75 (16 Apr 2020 06:42) (71 - 86)  BP: 92/66 (16 Apr 2020 06:42) (92/66 - 108/65)  BP(mean): --  RR: 18 (16 Apr 2020 06:42) (14 - 18)  SpO2: 96% (16 Apr 2020 06:42) (96% - 100%)    PHYSICAL EXAM:    General: chronically ill appearing elderly female lying in bed breathing comfortably, nonverbal  Respiratory: no increased work of breathing, breathing comfortably on RA  Gastrointestinal: soft, NT/ND; primafit  Extremities: WWP; no edema, chronic vascular changes bilaterally  Vascular: 2+ radial, DP/PT pulses B/L  Neurological: AAOx3; no focal deficits    MEDICATIONS:  MEDICATIONS  (STANDING):  ammonium lactate 12% Lotion 1 Application(s) Topical two times a day  artificial  tears Solution 1 Drop(s) Both EYES two times a day  azithromycin  IVPB 250 milliGRAM(s) IV Intermittent every 24 hours  dextrose 5%. 1000 milliLiter(s) (100 mL/Hr) IV Continuous <Continuous>  dextrose 5%. 1000 milliLiter(s) (50 mL/Hr) IV Continuous <Continuous>  dextrose 5%. 1000 milliLiter(s) (500 mL/Hr) IV Continuous <Continuous>  dextrose 50% Injectable 12.5 Gram(s) IV Push once  dextrose 50% Injectable 25 Gram(s) IV Push once  dextrose 50% Injectable 25 Gram(s) IV Push once  famotidine  IVPB 20 milliGRAM(s) IV Intermittent every 48 hours  insulin lispro (HumaLOG) corrective regimen sliding scale   SubCutaneous Before meals and at bedtime  methylPREDNISolone sodium succinate Injectable 40 milliGRAM(s) IV Push every 12 hours    MEDICATIONS  (PRN):  dextrose 40% Gel 15 Gram(s) Oral once PRN Blood Glucose LESS THAN 70 milliGRAM(s)/deciliter  glucagon  Injectable 1 milliGRAM(s) IntraMuscular once PRN Glucose LESS THAN 70 milligrams/deciliter      ALLERGIES:  Allergies    No Known Allergies    Intolerances        LABS:                        14.0   21.11 )-----------( 71       ( 16 Apr 2020 01:07 )             43.6     04-16    162<HH>  |  128<H>  |  87<H>  ----------------------------<  131<H>  3.8   |  24  |  1.45<H>    Ca    8.4      16 Apr 2020 04:24  Phos  3.8     04-16  Mg     2.6     04-16    TPro  6.9  /  Alb  2.3<L>  /  TBili  0.6  /  DBili  x   /  AST  87<H>  /  ALT  90<H>  /  AlkPhos  62  04-16    PT/INR - ( 16 Apr 2020 01:07 )   PT: 13.9 sec;   INR: 1.21          PTT - ( 16 Apr 2020 01:07 )  PTT:20.8 sec    CAPILLARY BLOOD GLUCOSE          RADIOLOGY & ADDITIONAL TESTS: Reviewed. OVERNIGHT EVENTS: NAEO    SUBJECTIVE / INTERVAL HPI: Patient seen and examined at bedside. Unable to obtain history because pt nonverbal, not answering questions.     VITAL SIGNS:  Vital Signs Last 24 Hrs  T(C): 35.6 (16 Apr 2020 06:42), Max: 36.3 (16 Apr 2020 00:01)  T(F): 96 (16 Apr 2020 06:42), Max: 97.4 (16 Apr 2020 00:01)  HR: 75 (16 Apr 2020 06:42) (71 - 86)  BP: 92/66 (16 Apr 2020 06:42) (92/66 - 108/65)  BP(mean): --  RR: 18 (16 Apr 2020 06:42) (14 - 18)  SpO2: 96% (16 Apr 2020 06:42) (96% - 100%)    PHYSICAL EXAM:    General: chronically ill appearing elderly female lying in bed breathing comfortably, nonverbal  Respiratory: no increased work of breathing, breathing comfortably on RA  Gastrointestinal: soft, NT/ND; primafit  Extremities: WWP; no edema, chronic vascular changes bilaterally with overlying scale  Vascular: 2+ radial, DP/PT pulses B/L  Neurological: AAOx3; no focal deficits    MEDICATIONS:  MEDICATIONS  (STANDING):  ammonium lactate 12% Lotion 1 Application(s) Topical two times a day  artificial  tears Solution 1 Drop(s) Both EYES two times a day  azithromycin  IVPB 250 milliGRAM(s) IV Intermittent every 24 hours  dextrose 5%. 1000 milliLiter(s) (100 mL/Hr) IV Continuous <Continuous>  dextrose 5%. 1000 milliLiter(s) (50 mL/Hr) IV Continuous <Continuous>  dextrose 5%. 1000 milliLiter(s) (500 mL/Hr) IV Continuous <Continuous>  dextrose 50% Injectable 12.5 Gram(s) IV Push once  dextrose 50% Injectable 25 Gram(s) IV Push once  dextrose 50% Injectable 25 Gram(s) IV Push once  famotidine  IVPB 20 milliGRAM(s) IV Intermittent every 48 hours  insulin lispro (HumaLOG) corrective regimen sliding scale   SubCutaneous Before meals and at bedtime  methylPREDNISolone sodium succinate Injectable 40 milliGRAM(s) IV Push every 12 hours    MEDICATIONS  (PRN):  dextrose 40% Gel 15 Gram(s) Oral once PRN Blood Glucose LESS THAN 70 milliGRAM(s)/deciliter  glucagon  Injectable 1 milliGRAM(s) IntraMuscular once PRN Glucose LESS THAN 70 milligrams/deciliter      ALLERGIES:  Allergies    No Known Allergies    Intolerances        LABS:                        14.0   21.11 )-----------( 71       ( 16 Apr 2020 01:07 )             43.6     04-16    162<HH>  |  128<H>  |  87<H>  ----------------------------<  131<H>  3.8   |  24  |  1.45<H>    Ca    8.4      16 Apr 2020 04:24  Phos  3.8     04-16  Mg     2.6     04-16    TPro  6.9  /  Alb  2.3<L>  /  TBili  0.6  /  DBili  x   /  AST  87<H>  /  ALT  90<H>  /  AlkPhos  62  04-16    PT/INR - ( 16 Apr 2020 01:07 )   PT: 13.9 sec;   INR: 1.21          PTT - ( 16 Apr 2020 01:07 )  PTT:20.8 sec    CAPILLARY BLOOD GLUCOSE          RADIOLOGY & ADDITIONAL TESTS: Reviewed.

## 2020-04-16 NOTE — CHART NOTE - NSCHARTNOTEFT_GEN_A_CORE
Goals of care conversation with HCP Radha Johns ([phone])   New MOLST in chart, but Radha Johns (HCP) would still like to keep DNR/DNI, but no limitations on medical intervention--ok with antiobiotics, labs, IVFs, etc.

## 2020-04-16 NOTE — H&P ADULT - PROBLEM SELECTOR PLAN 7
hx of LOLA  -Continue home Mirtazapine 5mg qHS hx of LOLA  -Hold home Mirtazapine 5mg qHS PLT 71 on admission  -Continue to monitor for now      ADDENDUM: thrombocytopenia likely in setting of COVID;  transfuse platelets if less than 10K or less than 50K w/ bleeding; hold lovenox if worsening platelets or signs of bleeding. check fibrinogen for possible DIC    #leukocytosis: may be concentrated from dehydration, monitor CBC, check UA PLT 71 on admission  -Continue to monitor for now      ADDENDUM: thrombocytopenia likely in setting of COVID;  transfuse platelets if less than 10K or less than 50K w/ bleeding; hold lovenox for now if worsening platelets in setting of dehydration; start lovenox if platelets are stable; check fibrinogen for possible DIC    #leukocytosis: may be concentrated from dehydration, monitor CBC, check UA PLT 71 on admission likely in setting of COVID  -Continue to monitor for now and transfuse platelets if less than 10K or less than 50K w/ bleeding  -hold lovenox for now if worsening platelets in setting of dehydration; start lovenox if platelets are stable  -f/u fibrinogen for possible DIC    #leukocytosis: may be concentrated from dehydration, monitor CBC, check UA PLT 71 on admission likely in setting of COVID  -Continue to monitor for now and transfuse platelets if less than 10K or less than 50K w/ bleeding  -hold lovenox for now if worsening platelets in setting of dehydration; start lovenox if platelets are stable  -f/u fibrinogen for possible DIC    #leukocytosis: may be concentrated from dehydration, monitor CBC, check UA    #elevated LFTs: likely in setting of covid, monitor LFTs, check hep panel

## 2020-04-16 NOTE — PROGRESS NOTE ADULT - PROBLEM SELECTOR PLAN 4
lactate 2.9, cleared to 1.2 s/p NS bolus, likely in setting of decreased PO intake, met 1/4 SIRS on arrival, (elevated WBC); will check UA as well Na 159 on admission, s/p NS IVF 500cc bolus in ED --> Na 162  -f/u urine lytes  -will give 500cc D5w bolus now and continue D5w @100cc/hr   -f/u recheck 4pm BMP

## 2020-04-16 NOTE — H&P ADULT - ATTENDING COMMENTS
patient seen and examined overnight    reviewed pertinent data, h&p; pt is nonverbal, unresponsive to verbal/ physical stimuli; unable to follow commands;  +b/l fine crackles on exam   a/f  COVID -19 along w/ encephalopathy and hypernatremia; started on IV azithromycin and IV solumderol for COVID19 (unable to tolerate PO, deferring plaquenil, no IV form); on D5W for hypernatremia, monitor BMP; check UA in setting of other underlying infection; followup repeat CXR; holding off on lovenox dosing in setting of thrombocytopenia, may be worse in setting of dehydration. monitor respiratory status, inflammatory markers, QTc, wean off o2 as tolerated. poor prognosis, pt is DNR/DNI, will need GOC w/ HCP for possible comfort care.       rest of plan as above.

## 2020-04-16 NOTE — H&P ADULT - PROBLEM SELECTOR PLAN 10
F: D5 @ 60cc/hr  E: K >4, Mag >2  N: NPO, pending speech/swallow consult  GI: Famotidine  DVT ppx: Lovenox  Code Status: DNR/DNI, discuss with HCP in AM for possible comfort care. F: D5 @ 60cc/hr  E: K >4, Mag >2  N: NPO, pending speech/swallow consult  GI: Famotidine  DVT ppx: Lovenox  Code Status: DNR/DNI, discuss with HCP in AM for possible comfort care.    ADDENDUM: holding lovenox in setting of thrombocytopenia F: D5 @ 60cc/hr  E: K >4, Mag >2  N: NPO, pending speech/swallow consult  GI: Famotidine  DVT ppx: None 2/2 thrombocytopenia  Code Status: DNR/DNI, discuss with HCP in AM for possible comfort care.

## 2020-04-16 NOTE — PROGRESS NOTE ADULT - PROBLEM SELECTOR PLAN 7
PLT 71 on admission likely in setting of COVID  -Continue to monitor for now and transfuse platelets if less than 10K or less than 50K w/ bleeding  -hold lovenox for now if worsening platelets in setting of dehydration; start lovenox if platelets are stable  -f/u fibrinogen for possible DIC    #leukocytosis: may be concentrated from dehydration, monitor CBC, check UA    #elevated LFTs: likely in setting of covid, monitor LFTs, check hep panel soft BPs, currently 108/65  -Hold home Atenolol 25mg

## 2020-04-16 NOTE — ED PROVIDER NOTE - OBJECTIVE STATEMENT
89 y/o F pt with PMHx of ASHD and HTN, and PSHx of bilateral hip replacements presents to ED c/o difficulty breathing and hypotension today. Pt was sent over by the attending at [facility] as she was found to be hypoxic to the 80s on room air, and hypotensive on systole. Pt is afebrile in ED and her VS are otherwise stable.

## 2020-04-16 NOTE — PROGRESS NOTE ADULT - PROBLEM SELECTOR PLAN 9
Plan: hx of LOLA  -Hold home Mirtazapine 5mg qHS. F: D5 @ 100cc/hr  E: K >4, Mag >2  N: NPO, pending speech/swallow consult  GI: Famotidine  DVT ppx: None 2/2 thrombocytopenia  Code Status: DNR/DNI, discuss with HCP in AM for possible comfort care.

## 2020-04-16 NOTE — PROGRESS NOTE ADULT - PROBLEM SELECTOR PLAN 10
F: D5 @ 60cc/hr  E: K >4, Mag >2  N: NPO, pending speech/swallow consult  GI: Famotidine  DVT ppx: None 2/2 thrombocytopenia  Code Status: DNR/DNI, discuss with HCP in AM for possible comfort care.

## 2020-04-16 NOTE — PROGRESS NOTE ADULT - ASSESSMENT
89yo Female, DNR/DNI (no abx or tube feeds), with PMHx of HTN, HLD, dCHF and generalized anxiety disorder who was brought to [facility] ED from [facility] home after pt was found to be hypoxic to the 80s on RA and hypotensive. Pt endorses generalized weakness x few weeks and SOB x few days per nursing facility note. Pt found to be COVID positive, RETA and dehydrated, pt now admitted to [facility] for further management.

## 2020-04-16 NOTE — H&P ADULT - NSHPLABSRESULTS_GEN_ALL_CORE
14.0   21.11 )-----------( 71       ( 16 Apr 2020 01:07 )             43.6     x   |  x   |  x   ----------------------------<  x   see note   |  x   |  x     Ca    8.6      16 Apr 2020 01:07    TPro  7.3  /  Alb  2.4<L>  /  TBili  0.6  /  DBili  x   /  AST  see note  /  ALT  see note  /  AlkPhos  60  04-16    PT/INR - ( 16 Apr 2020 01:07 )   PT: 13.9 sec;   INR: 1.21          PTT - ( 16 Apr 2020 01:07 )  PTT:20.8 sec      Procalcitonin: Procalcitonin, Serum: 0.79 ng/mL (04-16-20 @ 01:07)    D-dimer: D-Dimer Assay, Quantitative: 3667 ng/mL DDU (04-16-20 @ 01:07)     CRP: C-Reactive Protein, Serum: 25.72 mg/dL (04-16-20 @ 01:07)     Ferritin: Ferritin, Serum: 3048 ng/mL (04-16-20 @ 01:07)    Lactate: Lactate, Blood: 2.9 mmol/L (04-16-20 @ 02:41)    Trop I: Troponin T, Serum: 0.04 ng/mL (04-16-20 @ 01:07)    Ck: Creatine Kinase, Serum: 365 U/L (04-16-20 @ 01:07)

## 2020-04-16 NOTE — H&P ADULT - PROBLEM SELECTOR PLAN 5
soft BPs, currently 108/65  -Hold home Atenolol 25mg PLT 71 on admission  - PLT 71 on admission  -Continue to monitor for now d-dimer 3667 on admission  -Start Lovenox 45mg SQ QD and monitor for any signs of bleeding as pts PLT 71    ADDENDUM: holding lovenox in setting of thrombocytopenia, maybe lower in setting of dehydration d-dimer 3667 on admission  -holding lovenox 2/2 thrombocytopenia

## 2020-04-16 NOTE — ED PROVIDER NOTE - CARE PLAN
Principal Discharge DX:	Dehydration  Secondary Diagnosis:	RETA (acute kidney injury)  Secondary Diagnosis:	Hypernatremia

## 2020-04-16 NOTE — PROGRESS NOTE ADULT - PROBLEM SELECTOR PLAN 2
in setting of COVID and hypernatremia, baseline mental function unclear, obtain collateral. started on Covid treatment and D5W, monitor mental status for improvement; check CT head Presented with few weeks of generalized weakness and SOB; At [facility] pt found to be hypoxic to the 80s, now Satting 99% on RA  - COVID+, contact droplet isolation  - inflammatory markers: ddimer 3667, CRP 25.72, Ferritin 3048; procalcitonin 0.79, lactate 2.9, WBC 21.11 with left shift  -QTC on admission 475  -CXR revealed clear lungs  -will not treat with azithromycin/plaquenil as pt with MOLST stating no abx   -no plaquenil as pt saturating >94% and pt is NPO pending speech and swallow  -c/w solu-medrol 40mg IV BID x 5 days  -Continue to monitor daily CBC/CMP/Mg/PO4/CRP/Ferritin/D-Dimer  -Monitor O2 saturation, monitor for respiratory distress; NC as needed, avoid HFNC/BiPAP

## 2020-04-16 NOTE — PROGRESS NOTE ADULT - PROBLEM SELECTOR PLAN 6
BUN/Cr 86/1.61 on admission, most likely 2/2 dehydration s/p NS IVF 500cc bolus in ED  -f/u UA and urine lytes  -Continue to monitor and avoid nephrotoxic agents PLT 71 on admission likely in setting of COVID  -Continue to monitor for now and transfuse platelets if less than 10K or less than 50K w/ bleeding  -will continue with lovenox ppx  -f/u fibrinogen for possible DIC    #leukocytosis: may be concentrated from dehydration, monitor CBC, check UA    #elevated LFTs: likely in setting of covid, monitor LFTs, check hep panel

## 2020-04-16 NOTE — PROGRESS NOTE ADULT - PROBLEM SELECTOR PLAN 5
d-dimer 3667 on admission  -holding lovenox 2/2 thrombocytopenia BUN/Cr 86/1.61 on admission, most likely 2/2 dehydration s/p NS IVF 500cc bolus in ED Cr improved to 1.45  -f/u UA and urine lytes  -Continue to monitor and avoid nephrotoxic agents

## 2020-04-16 NOTE — PROGRESS NOTE ADULT - PROBLEM SELECTOR PLAN 1
Presented with few weeks of generalized weakness and SOB; At [facility] pt found to be hypoxic to the 80s, now Satting 98% on RA  -COVID +, contact droplet isolation  -inflammatory markers: ddimer 3667, CRP 25.72, Ferritin 3048; procalcitonin 0.79, lactate 2.9, WBC 21.11 with left shift  -QTC on admission 475  -CXR revealed clear lungs, f/u repeat in AM  -Start Azithromycin 250mg IV QD x 5 days, and solu-medrol 40mg IV BID x 5 days  -no Plaquenil as pt satting >94% on RA and unable to tolerate PO, as discussed with Dr. Gandhi  -Continue to monitor daily CBC/CMP/Mg/PO4/CRP/Ferritin/D-Dimer  -Monitor O2 saturation, monitor for respiratory distress; NC as needed, avoid HFNC/BiPAP Meeting 2/4 SIRS criteria (hypothermia and WBC 21). lactate 2.9, cleared to 1.2 s/p NS bolus, likely in setting of decreased PO intake  - f/u UA   - f/u bladder scan

## 2020-04-16 NOTE — H&P ADULT - PROBLEM SELECTOR PLAN 2
Na 159 on admission, s/p NS IVF 500cc bolus in ED  -f/u repeat BMP  -Start D5W at 60cc/hr  -Continue to monitor Na 159 on admission, s/p NS IVF 500cc bolus in ED  -f/u repeat BMP  -f/u urine lytes  -Start D5W at 60cc/hr Na 159 on admission, s/p NS IVF 500cc bolus in ED  -repeat BMP revealed Na 162  -f/u urine lytes  -Start D5W at 60cc/hr ADDENDUM : in setting of COVID and hypernatremia, baseline mental function unclear, obtain collateral. started on Covid treatment and D5W, monitor mental status for improvement ADDENDUM : in setting of COVID and hypernatremia, baseline mental function unclear, obtain collateral. started on Covid treatment and D5W, monitor mental status for improvement; check CT head in setting of COVID and hypernatremia, baseline mental function unclear, obtain collateral. started on Covid treatment and D5W, monitor mental status for improvement; check CT head

## 2020-04-16 NOTE — H&P ADULT - PROBLEM SELECTOR PLAN 1
Presented with few weeks of generalized weakness and SOB; At [facility] pt found to be hypoxic to the 80s, now Satting 98% on RA  -COVID +, contact droplet isolation  -inflammatory markers: ddimer 3667, CRP 25.72, Ferritin 3048; procalcitonin 0.79, lactate 2.9, WBC 21.11 with left shift  -QTC on admission 475  -CXR revealed clear lungs  -Continue Hydroxychloroquine 400mg BID x2 doses -> 200mg BID x 4 days (started 4/3/20)  -No azithromycin or abx per pts MOLST form  -Continue Tylenol 650mg Q6 PRN for fevers  -Continue to monitor daily CBC/CMP/Mg/PO4/CRP/Ferritin/D-Dimer  -Monitor O2 saturation, monitor for respiratory distress; NC as needed, avoid HFNC/BiPAP Presented with few weeks of generalized weakness and SOB; At [facility] pt found to be hypoxic to the 80s, now Satting 98% on RA  -COVID +, contact droplet isolation  -inflammatory markers: ddimer 3667, CRP 25.72, Ferritin 3048; procalcitonin 0.79, lactate 2.9, WBC 21.11 with left shift  -QTC on admission 475  -CXR revealed clear lungs  -No azithromycin or abx per pts MOLST form and no Plaquenil as pt is sating 97% on RA  -Continue to monitor daily CBC/CMP/Mg/PO4/CRP/Ferritin/D-Dimer  -Monitor O2 saturation, monitor for respiratory distress; NC as needed, avoid HFNC/BiPAP Presented with few weeks of generalized weakness and SOB; At [facility] pt found to be hypoxic to the 80s, now Satting 98% on RA  -COVID +, contact droplet isolation  -inflammatory markers: ddimer 3667, CRP 25.72, Ferritin 3048; procalcitonin 0.79, lactate 2.9, WBC 21.11 with left shift  -QTC on admission 475  -CXR revealed clear lungs, f/u repeat in AM  -Start Azithromycin 250mg PO QD x 5 days, and Hydroxychloroquine 800mg PO x 1 dose then 400mg QD x 4 days  -Continue to monitor daily CBC/CMP/Mg/PO4/CRP/Ferritin/D-Dimer  -Monitor O2 saturation, monitor for respiratory distress; NC as needed, avoid HFNC/BiPAP Presented with few weeks of generalized weakness and SOB; At [facility] pt found to be hypoxic to the 80s, now Satting 98% on RA  -COVID +, contact droplet isolation  -inflammatory markers: ddimer 3667, CRP 25.72, Ferritin 3048; procalcitonin 0.79, lactate 2.9, WBC 21.11 with left shift  -QTC on admission 475  -CXR revealed clear lungs, f/u repeat in AM  -Start Azithromycin 250mg PO QD x 5 days, and Hydroxychloroquine 800mg PO x 1 dose then 400mg QD x 4 days  -Continue to monitor daily CBC/CMP/Mg/PO4/CRP/Ferritin/D-Dimer  -Monitor O2 saturation, monitor for respiratory distress; NC as needed, avoid HFNC/BiPAP    ADDENDUM: unable to tolerate PO, aspiration risk, started on IV azithromycin, hold off on plaquenil; started on IV solumedrol w/ ICU approval , no tocilizumab in setting of possible other underlying infection Presented with few weeks of generalized weakness and SOB; At [facility] pt found to be hypoxic to the 80s, now Satting 98% on RA  -COVID +, contact droplet isolation  -inflammatory markers: ddimer 3667, CRP 25.72, Ferritin 3048; procalcitonin 0.79, lactate 2.9, WBC 21.11 with left shift  -QTC on admission 475  -CXR revealed clear lungs, f/u repeat in AM  -Start Azithromycin 250mg IV QD x 5 days, and solu-medrol 40mg IV BID x 5 days  -no Plaquenil as pt satting >94% on RA and unable to tolerate PO, as discussed with Dr. Gandhi  -Continue to monitor daily CBC/CMP/Mg/PO4/CRP/Ferritin/D-Dimer  -Monitor O2 saturation, monitor for respiratory distress; NC as needed, avoid HFNC/BiPAP

## 2020-04-16 NOTE — ED PROVIDER NOTE - CLINICAL SUMMARY MEDICAL DECISION MAKING FREE TEXT BOX
89 y/o F pt presents to ED with hypoxia and hypotension at nursing home. Pt's VS currently stable, will check CXR, labs, and reassess. 89 y/o F pt presents to ED with hypoxia and hypotension at nursing home. Pt's VS currently stable, will check CXR, labs, and reassess.  Pt with hypernatremia, RETA, dehydration. normal saline IV done. Pt with possible covid- swab done. Pt admitted.  MOLST form is DNR/DNI, No antibiotics

## 2020-04-16 NOTE — H&P ADULT - PROBLEM SELECTOR PLAN 4
PLT 71 on admission BUN/Cr 86/1.61 on admission, most likely 2/2 dehydration s/p NS IVF 500cc bolus in ED  -f/u UA and urine lytes  -Continue to monitor and avoid nephrotoxic agents ADDENDUM: lactate 2.9, cleared to 1.2 s/p NS bolus, likely in setting of decreased PO intake, met 1/4 SIRS on arrival, (elevated WBC); will check UA as well lactate 2.9, cleared to 1.2 s/p NS bolus, likely in setting of decreased PO intake, met 1/4 SIRS on arrival, (elevated WBC); will check UA as well

## 2020-04-16 NOTE — H&P ADULT - HISTORY OF PRESENT ILLNESS
89yo Female, DNR/DNI, with PMHx of HTN, HLD, dCHF and CKD who was brought to [facility] ED from [facility] after pt was found to be hypoxic to the 80s on RA and hypotensive. Pt endorses generalized weakness and SOB. Pt denies cough, congestion, fever/chills, myalgias, abd pain, N/V/D, CP, palpitations, LE edema, calf tenderness, recent travels or sick contact.  On arrival to ED, T 97.4, HR 84bpm, /62, RR 14, SpO2 99% RA; Labs significant for WBC 21.11 with left shift, PLT 71, Ddimer 3667, CRP 25.72, Ferritin 3048, Lactate 2.9, procal 0.79, Na 159, Cl 120, , Trop 0.04; CXR revealed clear lungs and COVID PCR positive. Pt received NS IVF 500cc bolus. Pt now admitted to [facility] for further management of COVID, RETA on CKD, and dehydration.    Date of onset of fever:  Date of onset of dyspnea:  Recent Travel:  Sick Contacts:  COVID Exposure:  Close Contacts:    ROS:  Fevers: Y/N  Malaise: Y/N  Myalgias: Y/N  SOB: Y/N          At rest: Y/N         With exertion: Y/N         At baseline: Y/ N  Cough: Y/N         Productive: Y/N  Nausea: Y/N  Vomiting: Y/N  Diarrhea: Y/N    PMHx:   HTN: Y/N  DM: Y/N  Lung Disease: Y/N       Specify:  Cardiovascular disease: Y/N  Malignancy: Y/N  Immunosuppression: Y/N  HIV: Y/N  CKD/ESRD: Y/N  Chronic Liver Disease: Y/N    SoHx:  Tobacco use: Y/N  Vape use: Y/N  Health care worker: Y/N 89yo Female, DNR/DNI (no abx or tube feeds), with PMHx of HTN, HLD, dCHF, CKD and generalized anxiety disorder who was brought to [facility] ED from [facility] nursing home after pt was found to be hypoxic to the 80s on RA and hypotensive. Pt endorses generalized weakness x few months and per facility nursing report pt has been dypsneic for a few days. Pt denies cough, congestion, fever/chills, myalgias, abd pain, N/V/D, CP, palpitations, LE edema or calf tenderness.  On arrival to ED, T 97.4, HR 84bpm, /62, RR 14, SpO2 99% RA; Labs significant for WBC 21.11 with left shift, PLT 71, Ddimer 3667, CRP 25.72, Ferritin 3048, Lactate 2.9, procal 0.79, Na 159, Cl 120, , Trop 0.04; CXR revealed clear lungs and COVID PCR positive. Pt received NS IVF 500cc bolus. Pt now admitted to [facility] for further management of COVID, RETA on CKD, and dehydration.    Date of onset of fever: NONE  Date of onset of dyspnea: few days  Recent Travel: NONE  Sick Contacts/COVID exposure: possible, lives in [facility]    ROS:  Fevers: N  Malaise: Y  Myalgias: N  SOB: Y          At rest: Y        With exertion: Y   Cough: N         Productive: N  Nausea: N  Vomiting: N  Diarrhea: Y/N    PMHx:   HTN: Y  DM: N  Lung Disease: N  Cardiovascular disease: N  Malignancy: N  Immunosuppression: N  HIV: N  CKD/ESRD: Y  Chronic Liver Disease: N    SoHx:  Tobacco use: N  Vape use: N  Health care worker: N 89yo Female, DNR/DNI (no abx, no tube feeds), with PMHx of HTN, HLD, dCHF and generalized anxiety disorder who was brought to [facility] ED from [facility] after pt was found to be hypoxic to the 80s on RA and hypotensive. Pt endorses generalized weakness x few weeks and per facility nursing report pt has been dyspneic for a few days. Denies cough, congestion, fever/chills, myalgias, abd pain, N/V/D, CP, palpitations, LE edema or calf tenderness.  On arrival to ED, T 97.4, HR 84bpm, /62, RR 14, SpO2 99% RA; Labs significant for WBC 21.11 with left shift, PLT 71, Ddimer 3667, CRP 25.72, Ferritin 3048, Lactate 2.9, procal 0.79, Na 159, Cl 120, BUN/Cr 86/1.61, , Trop 0.04; CXR revealed clear lungs and COVID PCR positive. Pt received NS IVF 500cc bolus. Pt now admitted to [facility] for further management of COVID, RETA, and dehydration.    Date of onset of fever: NONE  Date of onset of dyspnea: few days  Recent Travel: NONE  Sick Contacts/COVID exposure: possible, lives in [facility]    ROS:  Fevers: N  Malaise: Y  Myalgias: N  SOB: Y      Cough: N         Productive: N  Nausea: N  Vomiting: N  Diarrhea: N    PMHx:   HTN: Y  DM: N  Lung Disease: N  Cardiovascular disease: N  Malignancy: N  Immunosuppression: N  HIV: N  CKD/ESRD: N  Chronic Liver Disease: N

## 2020-04-16 NOTE — H&P ADULT - ASSESSMENT
89yo Female, DNR/DNI (no abx or tube feeds), with PMHx of HTN, HLD, dCHF, CKD and generalized anxiety disorder who was brought to [facility] ED from [facility] nursing home after pt was found to be hypoxic to the 80s on RA and hypotensive. Pt endorses generalized weakness x few months and SOB x few days per nursing facility note. Pt found to be COVID positive, RETA on CKD and dehydrated, pt now admitted to [facility] for further management. 89yo Female, DNR/DNI (no abx or tube feeds), with PMHx of HTN, HLD, dCHF, CKD and generalized anxiety disorder who was brought to [facility] ED from [facility] nursing home after pt was found to be hypoxic to the 80s on RA and hypotensive. Pt endorses generalized weakness x few weeks and SOB x few days per nursing facility note. Pt found to be COVID positive, RETA and dehydrated, pt now admitted to [facility] for further management. 89yo Female, DNR/DNI (no abx or tube feeds), with PMHx of HTN, HLD, dCHF and generalized anxiety disorder who was brought to [facility] ED from [facility] home after pt was found to be hypoxic to the 80s on RA and hypotensive. Pt endorses generalized weakness x few weeks and SOB x few days per nursing facility note. Pt found to be COVID positive, RETA and dehydrated, pt now admitted to [facility] for further management.

## 2020-04-16 NOTE — PROGRESS NOTE ADULT - PROBLEM SELECTOR PLAN 8
soft BPs, currently 108/65  -Hold home Atenolol 25mg Plan: hx of LOLA  -Hold home Mirtazapine 5mg qHS.

## 2020-04-16 NOTE — H&P ADULT - PROBLEM SELECTOR PLAN 6
hx of LOLA  -Continue home Mirtazapine 5mg qHS soft BPs, currently 108/65  -Hold home Atenolol 25mg BUN/Cr 86/1.61 on admission, most likely 2/2 dehydration s/p NS IVF 500cc bolus in ED  -f/u UA and urine lytes  -Continue to monitor and avoid nephrotoxic agents

## 2020-04-17 LAB
A1C WITH ESTIMATED AVERAGE GLUCOSE RESULT: 5.1 % — SIGNIFICANT CHANGE UP (ref 4–5.6)
ALBUMIN SERPL ELPH-MCNC: 1.9 G/DL — LOW (ref 3.3–5)
ALP SERPL-CCNC: 53 U/L — SIGNIFICANT CHANGE UP (ref 40–120)
ALT FLD-CCNC: 52 U/L — HIGH (ref 10–45)
ANION GAP SERPL CALC-SCNC: 11 MMOL/L — SIGNIFICANT CHANGE UP (ref 5–17)
ANION GAP SERPL CALC-SCNC: 14 MMOL/L — SIGNIFICANT CHANGE UP (ref 5–17)
APTT BLD: 31.5 SEC — SIGNIFICANT CHANGE UP (ref 27.5–36.3)
AST SERPL-CCNC: 30 U/L — SIGNIFICANT CHANGE UP (ref 10–40)
BASOPHILS # BLD AUTO: 0.03 K/UL — SIGNIFICANT CHANGE UP (ref 0–0.2)
BASOPHILS NFR BLD AUTO: 0.2 % — SIGNIFICANT CHANGE UP (ref 0–2)
BILIRUB SERPL-MCNC: 0.3 MG/DL — SIGNIFICANT CHANGE UP (ref 0.2–1.2)
BLD GP AB SCN SERPL QL: NEGATIVE — SIGNIFICANT CHANGE UP
BUN SERPL-MCNC: 52 MG/DL — HIGH (ref 7–23)
BUN SERPL-MCNC: 57 MG/DL — HIGH (ref 7–23)
CALCIUM SERPL-MCNC: 7.6 MG/DL — LOW (ref 8.4–10.5)
CALCIUM SERPL-MCNC: 8.4 MG/DL — SIGNIFICANT CHANGE UP (ref 8.4–10.5)
CHLORIDE SERPL-SCNC: 109 MMOL/L — HIGH (ref 96–108)
CHLORIDE SERPL-SCNC: 110 MMOL/L — HIGH (ref 96–108)
CO2 SERPL-SCNC: 22 MMOL/L — SIGNIFICANT CHANGE UP (ref 22–31)
CO2 SERPL-SCNC: 25 MMOL/L — SIGNIFICANT CHANGE UP (ref 22–31)
CREAT SERPL-MCNC: 0.87 MG/DL — SIGNIFICANT CHANGE UP (ref 0.5–1.3)
CREAT SERPL-MCNC: 0.94 MG/DL — SIGNIFICANT CHANGE UP (ref 0.5–1.3)
CRP SERPL-MCNC: 17.87 MG/DL — HIGH (ref 0–0.4)
D DIMER BLD IA.RAPID-MCNC: 6212 NG/ML DDU — HIGH
D DIMER BLD IA.RAPID-MCNC: 7919 NG/ML DDU — HIGH
EOSINOPHIL # BLD AUTO: 0 K/UL — SIGNIFICANT CHANGE UP (ref 0–0.5)
EOSINOPHIL NFR BLD AUTO: 0 % — SIGNIFICANT CHANGE UP (ref 0–6)
ESTIMATED AVERAGE GLUCOSE: 100 MG/DL — SIGNIFICANT CHANGE UP (ref 68–114)
FERRITIN SERPL-MCNC: 2679 NG/ML — HIGH (ref 15–150)
FIBRINOGEN PPP-MCNC: 565 MG/DL — HIGH (ref 258–438)
GAMMA INTERFERON BACKGROUND BLD IA-ACNC: 0.01 IU/ML — SIGNIFICANT CHANGE UP
GLUCOSE BLDC GLUCOMTR-MCNC: 124 MG/DL — HIGH (ref 70–99)
GLUCOSE BLDC GLUCOMTR-MCNC: 143 MG/DL — HIGH (ref 70–99)
GLUCOSE BLDC GLUCOMTR-MCNC: 162 MG/DL — HIGH (ref 70–99)
GLUCOSE BLDC GLUCOMTR-MCNC: 268 MG/DL — HIGH (ref 70–99)
GLUCOSE BLDC GLUCOMTR-MCNC: 57 MG/DL — LOW (ref 70–99)
GLUCOSE SERPL-MCNC: 218 MG/DL — HIGH (ref 70–99)
GLUCOSE SERPL-MCNC: 68 MG/DL — LOW (ref 70–99)
HCT VFR BLD CALC: 34.5 % — SIGNIFICANT CHANGE UP (ref 34.5–45)
HGB BLD-MCNC: 10.5 G/DL — LOW (ref 11.5–15.5)
IMM GRANULOCYTES NFR BLD AUTO: 1.1 % — SIGNIFICANT CHANGE UP (ref 0–1.5)
INR BLD: 1.2 — HIGH (ref 0.88–1.16)
LYMPHOCYTES # BLD AUTO: 1.69 K/UL — SIGNIFICANT CHANGE UP (ref 1–3.3)
LYMPHOCYTES # BLD AUTO: 8.7 % — LOW (ref 13–44)
M TB IFN-G BLD-IMP: NEGATIVE — SIGNIFICANT CHANGE UP
M TB IFN-G CD4+ BCKGRND COR BLD-ACNC: 0 IU/ML — SIGNIFICANT CHANGE UP
M TB IFN-G CD4+CD8+ BCKGRND COR BLD-ACNC: 0 IU/ML — SIGNIFICANT CHANGE UP
MAGNESIUM SERPL-MCNC: 2.2 MG/DL — SIGNIFICANT CHANGE UP (ref 1.6–2.6)
MCHC RBC-ENTMCNC: 29.9 PG — SIGNIFICANT CHANGE UP (ref 27–34)
MCHC RBC-ENTMCNC: 30.4 GM/DL — LOW (ref 32–36)
MCV RBC AUTO: 98.3 FL — SIGNIFICANT CHANGE UP (ref 80–100)
MONOCYTES # BLD AUTO: 0.31 K/UL — SIGNIFICANT CHANGE UP (ref 0–0.9)
MONOCYTES NFR BLD AUTO: 1.6 % — LOW (ref 2–14)
NEUTROPHILS # BLD AUTO: 17.21 K/UL — HIGH (ref 1.8–7.4)
NEUTROPHILS NFR BLD AUTO: 88.4 % — HIGH (ref 43–77)
NRBC # BLD: 0 /100 WBCS — SIGNIFICANT CHANGE UP (ref 0–0)
PHOSPHATE SERPL-MCNC: 2.7 MG/DL — SIGNIFICANT CHANGE UP (ref 2.5–4.5)
PLATELET # BLD AUTO: 31 K/UL — LOW (ref 150–400)
POTASSIUM SERPL-MCNC: 3.1 MMOL/L — LOW (ref 3.5–5.3)
POTASSIUM SERPL-MCNC: 4.2 MMOL/L — SIGNIFICANT CHANGE UP (ref 3.5–5.3)
POTASSIUM SERPL-SCNC: 3.1 MMOL/L — LOW (ref 3.5–5.3)
POTASSIUM SERPL-SCNC: 4.2 MMOL/L — SIGNIFICANT CHANGE UP (ref 3.5–5.3)
PROT SERPL-MCNC: 5.9 G/DL — LOW (ref 6–8.3)
PROTHROM AB SERPL-ACNC: 13.8 SEC — HIGH (ref 10–12.9)
QUANT TB PLUS MITOGEN MINUS NIL: 0.8 IU/ML — SIGNIFICANT CHANGE UP
RBC # BLD: 3.51 M/UL — LOW (ref 3.8–5.2)
RBC # FLD: 14.4 % — SIGNIFICANT CHANGE UP (ref 10.3–14.5)
RH IG SCN BLD-IMP: POSITIVE — SIGNIFICANT CHANGE UP
SODIUM SERPL-SCNC: 145 MMOL/L — SIGNIFICANT CHANGE UP (ref 135–145)
SODIUM SERPL-SCNC: 146 MMOL/L — HIGH (ref 135–145)
WBC # BLD: 19.45 K/UL — HIGH (ref 3.8–10.5)
WBC # FLD AUTO: 19.45 K/UL — HIGH (ref 3.8–10.5)

## 2020-04-17 PROCEDURE — 99232 SBSQ HOSP IP/OBS MODERATE 35: CPT | Mod: CS

## 2020-04-17 RX ORDER — SODIUM CHLORIDE 9 MG/ML
500 INJECTION, SOLUTION INTRAVENOUS
Refills: 0 | Status: DISCONTINUED | OUTPATIENT
Start: 2020-04-17 | End: 2020-04-17

## 2020-04-17 RX ORDER — SODIUM CHLORIDE 9 MG/ML
1000 INJECTION, SOLUTION INTRAVENOUS
Refills: 0 | Status: DISCONTINUED | OUTPATIENT
Start: 2020-04-17 | End: 2020-04-17

## 2020-04-17 RX ORDER — POTASSIUM CHLORIDE 20 MEQ
20 PACKET (EA) ORAL
Refills: 0 | Status: COMPLETED | OUTPATIENT
Start: 2020-04-17 | End: 2020-04-17

## 2020-04-17 RX ORDER — SODIUM CHLORIDE 9 MG/ML
1000 INJECTION, SOLUTION INTRAVENOUS
Refills: 0 | Status: DISCONTINUED | OUTPATIENT
Start: 2020-04-17 | End: 2020-04-18

## 2020-04-17 RX ORDER — DEXTROSE 50 % IN WATER 50 %
50 SYRINGE (ML) INTRAVENOUS ONCE
Refills: 0 | Status: COMPLETED | OUTPATIENT
Start: 2020-04-17 | End: 2020-04-17

## 2020-04-17 RX ORDER — SODIUM CHLORIDE 9 MG/ML
1000 INJECTION INTRAMUSCULAR; INTRAVENOUS; SUBCUTANEOUS
Refills: 0 | Status: DISCONTINUED | OUTPATIENT
Start: 2020-04-17 | End: 2020-04-17

## 2020-04-17 RX ADMIN — AZITHROMYCIN 250 MILLIGRAM(S): 500 TABLET, FILM COATED ORAL at 10:36

## 2020-04-17 RX ADMIN — Medication 3: at 08:44

## 2020-04-17 RX ADMIN — Medication 50 MILLIEQUIVALENT(S): at 15:25

## 2020-04-17 RX ADMIN — Medication 40 MILLIGRAM(S): at 05:02

## 2020-04-17 RX ADMIN — CEFTRIAXONE 100 MILLIGRAM(S): 500 INJECTION, POWDER, FOR SOLUTION INTRAMUSCULAR; INTRAVENOUS at 12:30

## 2020-04-17 RX ADMIN — Medication 50 MILLIEQUIVALENT(S): at 17:25

## 2020-04-17 RX ADMIN — Medication 50 MILLIEQUIVALENT(S): at 12:29

## 2020-04-17 RX ADMIN — Medication 1 DROP(S): at 05:43

## 2020-04-17 RX ADMIN — SODIUM CHLORIDE 65 MILLILITER(S): 9 INJECTION, SOLUTION INTRAVENOUS at 18:12

## 2020-04-17 RX ADMIN — Medication 1 DROP(S): at 18:11

## 2020-04-17 RX ADMIN — Medication 40 MILLIGRAM(S): at 18:11

## 2020-04-17 RX ADMIN — Medication 1 APPLICATION(S): at 18:10

## 2020-04-17 RX ADMIN — Medication 50 MILLILITER(S): at 18:11

## 2020-04-17 RX ADMIN — SODIUM CHLORIDE 100 MILLILITER(S): 9 INJECTION, SOLUTION INTRAVENOUS at 12:29

## 2020-04-17 RX ADMIN — Medication 1 APPLICATION(S): at 05:43

## 2020-04-17 RX ADMIN — Medication 1: at 13:32

## 2020-04-17 NOTE — SWALLOW BEDSIDE ASSESSMENT ADULT - SWALLOW EVAL: DIAGNOSIS
Pt presents w/ mild-mod oral deficits. Recs to initiate a modified PO diet w/ aspiration precautions.

## 2020-04-17 NOTE — PROGRESS NOTE ADULT - PROBLEM SELECTOR PLAN 3
AOx0, nonverbal, baseline per friend and niece (Radha) a week ago, lucid and eating/drinking. AMS likely multifactorial including urosepsis and COVID and hypernatremia. Expect mental status to resume to baseline after treatment.  -s/p 500cc NS bolus and 500cc D5w bolus and D5w fluids @130cc/hr   -f/u urine culture  -mental status improving--more awake today, opening eyes, tracking, intermittently smiling and nodding AOx0, nonverbal, baseline per friend and niece (Radha) a week ago, lucid and eating/drinking. AMS likely multifactorial including urosepsis and COVID and hypernatremia. Expect mental status to resume to baseline after treatment.  -s/p 500cc NS bolus and 500cc D5w bolus and D5w fluids @130cc/hr   -f/u urine culture  -mental status improving--more awake today, opening eyes, tracking, intermittently smiling and nodding    #poor po intake   - speech and swallow cleared for pureed and thin liquids

## 2020-04-17 NOTE — PROGRESS NOTE ADULT - SUBJECTIVE AND OBJECTIVE BOX
OVERNIGHT EVENTS: NAEO    SUBJECTIVE / INTERVAL HPI: Patient seen and examined at bedside.     VITAL SIGNS:  Vital Signs Last 24 Hrs  T(C): 36.6 (2020 22:00), Max: 36.6 (2020 22:00)  T(F): 97.9 (2020 22:00), Max: 97.9 (2020 22:00)  HR: 58 (2020 06:48) (58 - 72)  BP: 108/70 (2020 06:48) (102/70 - 108/70)  BP(mean): --  RR: 19 (2020 06:48) (18 - 19)  SpO2: 97% (2020 06:48) (96% - 97%)    PHYSICAL EXAM:    General: chronically ill appearing frail elderly female, nonverbal, but nodding and smiling intermittently, more awake today  HEENT: NC/AT; PERRL, anicteric sclera; poor oral hygiene, dry lisp  Respiratory: no increased work of breathing, breathing comfortably on RA; saturating 98%  Gastrointestinal: soft, NT/ND  : primafit in place draining yellow-orange urine  Extremities: WWP; no edema, chronic vascular changes bilaterally up to knees with scaling, no purulence or exudates  Neurological: AAOx0, nonverbal, however nodding and smiling intermittently     MEDICATIONS:  MEDICATIONS  (STANDING):  ammonium lactate 12% Lotion 1 Application(s) Topical two times a day  artificial  tears Solution 1 Drop(s) Both EYES two times a day  azithromycin  IVPB 250 milliGRAM(s) IV Intermittent every 24 hours  cefTRIAXone   IVPB 1000 milliGRAM(s) IV Intermittent every 24 hours  dextrose 5%. 1000 milliLiter(s) (130 mL/Hr) IV Continuous <Continuous>  dextrose 5%. 1000 milliLiter(s) (50 mL/Hr) IV Continuous <Continuous>  dextrose 50% Injectable 12.5 Gram(s) IV Push once  dextrose 50% Injectable 25 Gram(s) IV Push once  dextrose 50% Injectable 25 Gram(s) IV Push once  famotidine  IVPB 20 milliGRAM(s) IV Intermittent every 48 hours  insulin lispro (HumaLOG) corrective regimen sliding scale   SubCutaneous Before meals and at bedtime  methylPREDNISolone sodium succinate Injectable 40 milliGRAM(s) IV Push every 12 hours    MEDICATIONS  (PRN):  dextrose 40% Gel 15 Gram(s) Oral once PRN Blood Glucose LESS THAN 70 milliGRAM(s)/deciliter  glucagon  Injectable 1 milliGRAM(s) IntraMuscular once PRN Glucose LESS THAN 70 milligrams/deciliter      ALLERGIES:  Allergies    No Known Allergies    Intolerances        LABS:                        10.5   19.45 )-----------( 31       ( 2020 09:38 )             34.5     04-16    158<H>  |  120<H>  |  69<H>  ----------------------------<  163<H>  3.8   |  26  |  1.13    Ca    7.8<L>      2020 21:05  Phos  3.8     04-16  Mg     2.6     04-16    TPro  6.9  /  Alb  2.3<L>  /  TBili  0.6  /  DBili  x   /  AST  87<H>  /  ALT  90<H>  /  AlkPhos  62  04-16    PT/INR - ( 2020 01:07 )   PT: 13.9 sec;   INR: 1.21          PTT - ( 2020 01:07 )  PTT:20.8 sec  Urinalysis Basic - ( 2020 11:44 )    Color: Yellow / Appearance: Clear / S.025 / pH: x  Gluc: x / Ketone: Trace mg/dL  / Bili: Negative / Urobili: 0.2 E.U./dL   Blood: x / Protein: 30 mg/dL / Nitrite: POSITIVE   Leuk Esterase: Large / RBC: < 5 /HPF / WBC Many /HPF   Sq Epi: x / Non Sq Epi: 0-5 /HPF / Bacteria: Many /HPF      CAPILLARY BLOOD GLUCOSE      POCT Blood Glucose.: 268 mg/dL (2020 08:04)      RADIOLOGY & ADDITIONAL TESTS: Reviewed. OVERNIGHT EVENTS: NAEO    SUBJECTIVE / INTERVAL HPI: Patient seen and examined at bedside. Breathing comfortably and saturating well at 98% on RA. Pt still nonverbal, however more awake and interactive this AM; opening eyes and tracking; pt nods and smiles intermittently in response to voice, however still nonverbal.    VITAL SIGNS:  Vital Signs Last 24 Hrs  T(C): 36.6 (2020 22:00), Max: 36.6 (2020 22:00)  T(F): 97.9 (2020 22:00), Max: 97.9 (2020 22:00)  HR: 58 (2020 06:48) (58 - 72)  BP: 108/70 (2020 06:48) (102/70 - 108/70)  BP(mean): --  RR: 19 (2020 06:48) (18 - 19)  SpO2: 97% (2020 06:48) (96% - 97%)    PHYSICAL EXAM:    General: chronically ill appearing frail elderly female, nonverbal, but nodding and smiling intermittently, more awake today  HEENT: NC/AT; PERRL, anicteric sclera; poor oral hygiene, dry lisp  Respiratory: no increased work of breathing, breathing comfortably on RA; saturating 98%  Gastrointestinal: soft, NT/ND  : primafit in place draining yellow-orange urine  Extremities: WWP; no edema, chronic vascular changes bilaterally up to knees with scaling, no purulence or exudates  Neurological: AAOx0, nonverbal, however nodding and smiling intermittently     MEDICATIONS:  MEDICATIONS  (STANDING):  ammonium lactate 12% Lotion 1 Application(s) Topical two times a day  artificial  tears Solution 1 Drop(s) Both EYES two times a day  azithromycin  IVPB 250 milliGRAM(s) IV Intermittent every 24 hours  cefTRIAXone   IVPB 1000 milliGRAM(s) IV Intermittent every 24 hours  dextrose 5%. 1000 milliLiter(s) (130 mL/Hr) IV Continuous <Continuous>  dextrose 5%. 1000 milliLiter(s) (50 mL/Hr) IV Continuous <Continuous>  dextrose 50% Injectable 12.5 Gram(s) IV Push once  dextrose 50% Injectable 25 Gram(s) IV Push once  dextrose 50% Injectable 25 Gram(s) IV Push once  famotidine  IVPB 20 milliGRAM(s) IV Intermittent every 48 hours  insulin lispro (HumaLOG) corrective regimen sliding scale   SubCutaneous Before meals and at bedtime  methylPREDNISolone sodium succinate Injectable 40 milliGRAM(s) IV Push every 12 hours    MEDICATIONS  (PRN):  dextrose 40% Gel 15 Gram(s) Oral once PRN Blood Glucose LESS THAN 70 milliGRAM(s)/deciliter  glucagon  Injectable 1 milliGRAM(s) IntraMuscular once PRN Glucose LESS THAN 70 milligrams/deciliter      ALLERGIES:  Allergies    No Known Allergies    Intolerances        LABS:                        10.5   19.45 )-----------( 31       ( 2020 09:38 )             34.5     04-16    158<H>  |  120<H>  |  69<H>  ----------------------------<  163<H>  3.8   |  26  |  1.13    Ca    7.8<L>      2020 21:05  Phos  3.8     04-16  Mg     2.6     04-16    TPro  6.9  /  Alb  2.3<L>  /  TBili  0.6  /  DBili  x   /  AST  87<H>  /  ALT  90<H>  /  AlkPhos  62  04-16    PT/INR - ( 2020 01:07 )   PT: 13.9 sec;   INR: 1.21          PTT - ( 2020 01:07 )  PTT:20.8 sec  Urinalysis Basic - ( 2020 11:44 )    Color: Yellow / Appearance: Clear / S.025 / pH: x  Gluc: x / Ketone: Trace mg/dL  / Bili: Negative / Urobili: 0.2 E.U./dL   Blood: x / Protein: 30 mg/dL / Nitrite: POSITIVE   Leuk Esterase: Large / RBC: < 5 /HPF / WBC Many /HPF   Sq Epi: x / Non Sq Epi: 0-5 /HPF / Bacteria: Many /HPF      CAPILLARY BLOOD GLUCOSE      POCT Blood Glucose.: 268 mg/dL (2020 08:04)      RADIOLOGY & ADDITIONAL TESTS: Reviewed.

## 2020-04-17 NOTE — PROGRESS NOTE ADULT - PROBLEM SELECTOR PLAN 1
Meeting 2/4 SIRS criteria (hypothermia and WBC 21). lactate 2.9, cleared to 1.2 s/p NS bolus, likely in setting of decreased PO intake. U/A grossly positive. Pt urinating with primafit  - c/w IV CTX 1g q24 (started 4/16); will adjust abx treatment pending Ucx and sensitivities  - f/u urine culture and sensitivities

## 2020-04-17 NOTE — PROGRESS NOTE ADULT - PROBLEM SELECTOR PLAN 2
Presented with few weeks of generalized weakness and SOB; At [facility] pt found to be hypoxic to the 80s, now Satting 99% on RA  - COVID+, contact droplet isolation  - inflammatory markers: ddimer 3667, CRP 25.72, Ferritin 3048; procalcitonin 0.79, lactate 2.9, WBC 21.11 with left shift  -QTC on admission 475  -CXR revealed clear lungs  -will not treat with azithromycin/plaquenil as pt with MOLST stating no abx   -no plaquenil as pt saturating >94% and pt is NPO pending speech and swallow  -c/w solu-medrol 40mg IV BID x 5 days  -Continue to monitor daily CBC/CMP/Mg/PO4/CRP/Ferritin/D-Dimer  -Monitor O2 saturation, monitor for respiratory distress; NC as needed, avoid HFNC/BiPAP Presented with few weeks of generalized weakness and SOB; At [facility] pt found to be hypoxic to the 80s, now Satting 99% on RA  - COVID+, contact droplet isolation  - inflammatory markers: ddimer 3667, CRP 25.72, Ferritin 3048; procalcitonin 0.79, lactate 2.9, WBC 21.11 with left shift  -QTC on admission 475  -CXR revealed clear lungs  -confirmed with HCP that previous "no abx" stated on previous MOLST will be reversed   -continue treatment with IV azithromycin 250mg x 5 days (end 4/20)  -c/w solu-medrol 40mg IV BID x 5 days (end 4/20)  -Continue to monitor daily CBC/CMP/Mg/PO4/CRP/Ferritin/D-Dimer  -Monitor O2 saturation, monitor for respiratory distress; NC as needed, avoid HFNC/BiPAP

## 2020-04-17 NOTE — PROGRESS NOTE ADULT - ASSESSMENT
89yo Female, DNR/DNI (no abx or tube feeds), with PMHx of HTN, HLD, dCHF and generalized anxiety disorder who was brought to [facility] ED from [facility] home after pt was found to be hypoxic to the 80s on RA and hypotensive. Pt endorses generalized weakness x few weeks and SOB x few days per nursing facility note. Pt found to be COVID positive, RETA and dehydrated, pt now admitted to [facility] for further management.

## 2020-04-17 NOTE — PROGRESS NOTE ADULT - PROBLEM SELECTOR PLAN 9
F: pending AM Na  E: K >4, Mag >2  N: NPO, pending speech/swallow consult  GI: Famotidine 20mg daily  DVT ppx: None 2/2 thrombocytopenia  Code Status: DNR/DNI, confirmed NOT comfort care F: NS @ 100cc/hr x 10 hrs  E: K >4, Mag >2  N: NPO, pending speech/swallow consult  GI: Famotidine 20mg daily  DVT ppx: None 2/2 thrombocytopenia  Code Status: DNR/DNI, confirmed NOT comfort care F: LR @ 100cc/hr x 10 hrs  E: K >4, Mag >2  N: dysphagia; pureed and thin liquids per speech/swallow  GI: Famotidine 20mg daily  DVT ppx: None 2/2 thrombocytopenia  Code Status: DNR/DNI, confirmed NOT comfort care

## 2020-04-17 NOTE — PROGRESS NOTE ADULT - PROBLEM SELECTOR PLAN 4
Na 159 on admission, s/p NS IVF 500cc bolus in ED. Likely 2/2 dehydration as evidenced by labs  -s/p 500cc D5w bolus and D5w @100cc/hr --> 130cc/hr   -continue daily BMPs Na 159 on admission, s/p NS IVF 500cc bolus in ED. Likely 2/2 dehydration as evidenced by labs; Na 146, corrected to 148 this AM  -s/p 500cc D5w bolus and D5w @100cc/hr --> 130cc/hr which corrected Na to 146 (corrected to 148 for glucose)   -will continue today with NS @ 100cc/hr x 10 hrs  -recheck BMP 4pm

## 2020-04-17 NOTE — SWALLOW BEDSIDE ASSESSMENT ADULT - SLP PERTINENT HISTORY OF CURRENT PROBLEM
Severe sepsis, encephalopathy, COVID-19+. PMHx of HTN, HLD, dCHF and generalized anxiety disorder. Pt admitted from NH. DNR/DNI, confirmed NOT comfort care.

## 2020-04-17 NOTE — PROGRESS NOTE ADULT - PROBLEM SELECTOR PLAN 6
PLT 71 on admission likely in setting of COVID and acute infection as pt with normal plt in 07/2018.  -Continue to monitor for now and transfuse platelets if less than 10K or less than 50K w/ bleeding  -holding VTE ppx in setting of thrombocytopenia due to high risk of bleeding   -f/u fibrinogen, PT/PTT/INR for possible DIC    #elevated LFTs: likely in setting of covid, monitor LFTs, check hep panel

## 2020-04-17 NOTE — SWALLOW BEDSIDE ASSESSMENT ADULT - SWALLOW EVAL: RECOMMENDED FEEDING/EATING TECHNIQUES
alternate food with liquid/maintain upright posture during/after eating for 30 mins/allow for swallow between intakes/check mouth frequently for oral residue/pocketing/crush medication (when feasible)/position upright (90 degrees)/small sips/bites/no straws/oral hygiene

## 2020-04-18 DIAGNOSIS — N39.0 URINARY TRACT INFECTION, SITE NOT SPECIFIED: ICD-10-CM

## 2020-04-18 LAB
-  AMPICILLIN/SULBACTAM: SIGNIFICANT CHANGE UP
-  AMPICILLIN: SIGNIFICANT CHANGE UP
-  CEFAZOLIN: SIGNIFICANT CHANGE UP
-  CEFTRIAXONE: SIGNIFICANT CHANGE UP
-  CIPROFLOXACIN: SIGNIFICANT CHANGE UP
-  GENTAMICIN: SIGNIFICANT CHANGE UP
-  MEROPENEM: SIGNIFICANT CHANGE UP
-  NITROFURANTOIN: SIGNIFICANT CHANGE UP
-  PIPERACILLIN/TAZOBACTAM: SIGNIFICANT CHANGE UP
-  TOBRAMYCIN: SIGNIFICANT CHANGE UP
-  TRIMETHOPRIM/SULFAMETHOXAZOLE: SIGNIFICANT CHANGE UP
ALBUMIN SERPL ELPH-MCNC: 2 G/DL — LOW (ref 3.3–5)
ALP SERPL-CCNC: 66 U/L — SIGNIFICANT CHANGE UP (ref 40–120)
ALT FLD-CCNC: 68 U/L — HIGH (ref 10–45)
ANION GAP SERPL CALC-SCNC: 10 MMOL/L — SIGNIFICANT CHANGE UP (ref 5–17)
ANION GAP SERPL CALC-SCNC: 9 MMOL/L — SIGNIFICANT CHANGE UP (ref 5–17)
APTT BLD: 32 SEC — SIGNIFICANT CHANGE UP (ref 27.5–36.3)
AST SERPL-CCNC: 71 U/L — HIGH (ref 10–40)
BASOPHILS # BLD AUTO: 0.01 K/UL — SIGNIFICANT CHANGE UP (ref 0–0.2)
BASOPHILS NFR BLD AUTO: 0.1 % — SIGNIFICANT CHANGE UP (ref 0–2)
BILIRUB SERPL-MCNC: 0.3 MG/DL — SIGNIFICANT CHANGE UP (ref 0.2–1.2)
BUN SERPL-MCNC: 41 MG/DL — HIGH (ref 7–23)
BUN SERPL-MCNC: 47 MG/DL — HIGH (ref 7–23)
CALCIUM SERPL-MCNC: 8.1 MG/DL — LOW (ref 8.4–10.5)
CALCIUM SERPL-MCNC: 8.2 MG/DL — LOW (ref 8.4–10.5)
CHLORIDE SERPL-SCNC: 114 MMOL/L — HIGH (ref 96–108)
CHLORIDE SERPL-SCNC: 116 MMOL/L — HIGH (ref 96–108)
CLOSURE TME COLL+EPINEP BLD: 75 K/UL — LOW (ref 150–400)
CO2 SERPL-SCNC: 23 MMOL/L — SIGNIFICANT CHANGE UP (ref 22–31)
CO2 SERPL-SCNC: 23 MMOL/L — SIGNIFICANT CHANGE UP (ref 22–31)
CREAT SERPL-MCNC: 0.73 MG/DL — SIGNIFICANT CHANGE UP (ref 0.5–1.3)
CREAT SERPL-MCNC: 0.82 MG/DL — SIGNIFICANT CHANGE UP (ref 0.5–1.3)
CRP SERPL-MCNC: 8.95 MG/DL — HIGH (ref 0–0.4)
CULTURE RESULTS: SIGNIFICANT CHANGE UP
D DIMER BLD IA.RAPID-MCNC: 5467 NG/ML DDU — HIGH
EOSINOPHIL # BLD AUTO: 0 K/UL — SIGNIFICANT CHANGE UP (ref 0–0.5)
EOSINOPHIL NFR BLD AUTO: 0 % — SIGNIFICANT CHANGE UP (ref 0–6)
FERRITIN SERPL-MCNC: 3070 NG/ML — HIGH (ref 15–150)
GLUCOSE BLDC GLUCOMTR-MCNC: 116 MG/DL — HIGH (ref 70–99)
GLUCOSE BLDC GLUCOMTR-MCNC: 117 MG/DL — HIGH (ref 70–99)
GLUCOSE BLDC GLUCOMTR-MCNC: 78 MG/DL — SIGNIFICANT CHANGE UP (ref 70–99)
GLUCOSE BLDC GLUCOMTR-MCNC: 81 MG/DL — SIGNIFICANT CHANGE UP (ref 70–99)
GLUCOSE SERPL-MCNC: 104 MG/DL — HIGH (ref 70–99)
GLUCOSE SERPL-MCNC: 148 MG/DL — HIGH (ref 70–99)
HCT VFR BLD CALC: 36.1 % — SIGNIFICANT CHANGE UP (ref 34.5–45)
HGB BLD-MCNC: 11.3 G/DL — LOW (ref 11.5–15.5)
IMM GRANULOCYTES NFR BLD AUTO: 1 % — SIGNIFICANT CHANGE UP (ref 0–1.5)
INR BLD: 1.22 — HIGH (ref 0.88–1.16)
LYMPHOCYTES # BLD AUTO: 1.07 K/UL — SIGNIFICANT CHANGE UP (ref 1–3.3)
LYMPHOCYTES # BLD AUTO: 6.9 % — LOW (ref 13–44)
MAGNESIUM SERPL-MCNC: 2.3 MG/DL — SIGNIFICANT CHANGE UP (ref 1.6–2.6)
MCHC RBC-ENTMCNC: 29.7 PG — SIGNIFICANT CHANGE UP (ref 27–34)
MCHC RBC-ENTMCNC: 31.3 GM/DL — LOW (ref 32–36)
MCV RBC AUTO: 95 FL — SIGNIFICANT CHANGE UP (ref 80–100)
METHOD TYPE: SIGNIFICANT CHANGE UP
MONOCYTES # BLD AUTO: 0.16 K/UL — SIGNIFICANT CHANGE UP (ref 0–0.9)
MONOCYTES NFR BLD AUTO: 1 % — LOW (ref 2–14)
NEUTROPHILS # BLD AUTO: 14.12 K/UL — HIGH (ref 1.8–7.4)
NEUTROPHILS NFR BLD AUTO: 91 % — HIGH (ref 43–77)
NRBC # BLD: 0 /100 WBCS — SIGNIFICANT CHANGE UP (ref 0–0)
ORGANISM # SPEC MICROSCOPIC CNT: SIGNIFICANT CHANGE UP
ORGANISM # SPEC MICROSCOPIC CNT: SIGNIFICANT CHANGE UP
PHOSPHATE SERPL-MCNC: 3 MG/DL — SIGNIFICANT CHANGE UP (ref 2.5–4.5)
PLATELET # BLD AUTO: 71 K/UL — LOW (ref 150–400)
POTASSIUM SERPL-MCNC: 4.6 MMOL/L — SIGNIFICANT CHANGE UP (ref 3.5–5.3)
POTASSIUM SERPL-MCNC: 5.7 MMOL/L — HIGH (ref 3.5–5.3)
POTASSIUM SERPL-SCNC: 4.6 MMOL/L — SIGNIFICANT CHANGE UP (ref 3.5–5.3)
POTASSIUM SERPL-SCNC: 5.7 MMOL/L — HIGH (ref 3.5–5.3)
PROT SERPL-MCNC: 6.2 G/DL — SIGNIFICANT CHANGE UP (ref 6–8.3)
PROTHROM AB SERPL-ACNC: 14 SEC — HIGH (ref 10–12.9)
RBC # BLD: 3.8 M/UL — SIGNIFICANT CHANGE UP (ref 3.8–5.2)
RBC # FLD: 14.3 % — SIGNIFICANT CHANGE UP (ref 10.3–14.5)
SODIUM SERPL-SCNC: 147 MMOL/L — HIGH (ref 135–145)
SODIUM SERPL-SCNC: 148 MMOL/L — HIGH (ref 135–145)
SPECIMEN SOURCE: SIGNIFICANT CHANGE UP
WBC # BLD: 15.51 K/UL — HIGH (ref 3.8–10.5)
WBC # FLD AUTO: 15.51 K/UL — HIGH (ref 3.8–10.5)

## 2020-04-18 PROCEDURE — 99232 SBSQ HOSP IP/OBS MODERATE 35: CPT | Mod: CS

## 2020-04-18 RX ORDER — ENOXAPARIN SODIUM 100 MG/ML
40 INJECTION SUBCUTANEOUS EVERY 24 HOURS
Refills: 0 | Status: DISCONTINUED | OUTPATIENT
Start: 2020-04-18 | End: 2020-04-19

## 2020-04-18 RX ORDER — SODIUM POLYSTYRENE SULFONATE 4.1 MEQ/G
15 POWDER, FOR SUSPENSION ORAL ONCE
Refills: 0 | Status: COMPLETED | OUTPATIENT
Start: 2020-04-18 | End: 2020-04-18

## 2020-04-18 RX ORDER — SODIUM POLYSTYRENE SULFONATE 4.1 MEQ/G
30 POWDER, FOR SUSPENSION ORAL ONCE
Refills: 0 | Status: COMPLETED | OUTPATIENT
Start: 2020-04-18 | End: 2020-04-18

## 2020-04-18 RX ORDER — SODIUM POLYSTYRENE SULFONATE 4.1 MEQ/G
15 POWDER, FOR SUSPENSION ORAL ONCE
Refills: 0 | Status: DISCONTINUED | OUTPATIENT
Start: 2020-04-18 | End: 2020-04-18

## 2020-04-18 RX ORDER — ERTAPENEM SODIUM 1 G/1
1000 INJECTION, POWDER, LYOPHILIZED, FOR SOLUTION INTRAMUSCULAR; INTRAVENOUS EVERY 24 HOURS
Refills: 0 | Status: DISCONTINUED | OUTPATIENT
Start: 2020-04-18 | End: 2020-04-21

## 2020-04-18 RX ORDER — ENOXAPARIN SODIUM 100 MG/ML
40 INJECTION SUBCUTANEOUS EVERY 24 HOURS
Refills: 0 | Status: DISCONTINUED | OUTPATIENT
Start: 2020-04-18 | End: 2020-04-18

## 2020-04-18 RX ADMIN — Medication 1 DROP(S): at 04:44

## 2020-04-18 RX ADMIN — SODIUM POLYSTYRENE SULFONATE 30 GRAM(S): 4.1 POWDER, FOR SUSPENSION ORAL at 11:09

## 2020-04-18 RX ADMIN — Medication 1 APPLICATION(S): at 04:44

## 2020-04-18 RX ADMIN — Medication 1 DROP(S): at 17:43

## 2020-04-18 RX ADMIN — SODIUM POLYSTYRENE SULFONATE 15 GRAM(S): 4.1 POWDER, FOR SUSPENSION ORAL at 13:22

## 2020-04-18 RX ADMIN — ENOXAPARIN SODIUM 40 MILLIGRAM(S): 100 INJECTION SUBCUTANEOUS at 13:22

## 2020-04-18 RX ADMIN — ERTAPENEM SODIUM 120 MILLIGRAM(S): 1 INJECTION, POWDER, LYOPHILIZED, FOR SOLUTION INTRAMUSCULAR; INTRAVENOUS at 13:22

## 2020-04-18 RX ADMIN — Medication 40 MILLIGRAM(S): at 04:38

## 2020-04-18 RX ADMIN — Medication 1 APPLICATION(S): at 17:43

## 2020-04-18 RX ADMIN — Medication 40 MILLIGRAM(S): at 17:43

## 2020-04-18 NOTE — PROGRESS NOTE ADULT - PROBLEM SELECTOR PLAN 2
Presented with few weeks of generalized weakness and SOB; At [facility] pt found to be hypoxic to the 80s, now Saturating 98% on RA  - COVID+, contact droplet isolation  - inflammatory markers: ddimer 3667, CRP 25.72, Ferritin 3048; procalcitonin 0.79, lactate 2.9, WBC 21.11 with left shift  -QTC on admission 475  -CXR revealed clear lungs  -confirmed with HCP that previous "no abx" stated on previous MOLST will be reversed   -continue treatment with IV azithromycin 250mg x 5 days (end 4/20)  -c/w solu-medrol 40mg IV BID x 5 days (end 4/20)  -Continue to monitor daily CBC/CMP/Mg/PO4/CRP/Ferritin/D-Dimer  -Monitor O2 saturation, monitor for respiratory distress; NC as needed, avoid HFNC/BiPAP

## 2020-04-18 NOTE — PROGRESS NOTE ADULT - SUBJECTIVE AND OBJECTIVE BOX
OVERNIGHT EVENTS: NAEO    SUBJECTIVE / INTERVAL HPI: Patient seen and examined at bedside.     VITAL SIGNS:  Vital Signs Last 24 Hrs  T(C): 36.3 (18 Apr 2020 10:29), Max: 36.7 (17 Apr 2020 21:35)  T(F): 97.3 (18 Apr 2020 10:29), Max: 98.1 (17 Apr 2020 21:35)  HR: 72 (18 Apr 2020 10:29) (62 - 72)  BP: 101/61 (18 Apr 2020 10:29) (101/61 - 122/74)  BP(mean): --  RR: 19 (18 Apr 2020 10:29) (16 - 19)  SpO2: 98% (18 Apr 2020 10:29) (97% - 100%)    PHYSICAL EXAM:    General: chronically ill appearing frail elderly female, speaking intermittently, AOx1-2  HEENT: NC/AT; PERRL, anicteric sclera; poor oral hygiene, dry lips  Respiratory: no increased work of breathing, breathing comfortably on RA; saturating 98%  Gastrointestinal: soft, NT/ND  : primafit in place draining yellow-orange urine  Extremities: WWP; no edema, chronic vascular changes bilaterally up to knees with scaling, no purulence or exudates  Neurological: speaking intermittently, AOx1-2    MEDICATIONS:  MEDICATIONS  (STANDING):  ammonium lactate 12% Lotion 1 Application(s) Topical two times a day  artificial  tears Solution 1 Drop(s) Both EYES two times a day  dextrose 5%. 1000 milliLiter(s) (50 mL/Hr) IV Continuous <Continuous>  dextrose 50% Injectable 12.5 Gram(s) IV Push once  dextrose 50% Injectable 25 Gram(s) IV Push once  dextrose 50% Injectable 25 Gram(s) IV Push once  enoxaparin Injectable 40 milliGRAM(s) SubCutaneous every 24 hours  ertapenem  IVPB 1000 milliGRAM(s) IV Intermittent every 24 hours  insulin lispro (HumaLOG) corrective regimen sliding scale   SubCutaneous Before meals and at bedtime  methylPREDNISolone sodium succinate Injectable 40 milliGRAM(s) IV Push every 12 hours  sodium polystyrene sulfonate Enema 15 Gram(s) Rectal once    MEDICATIONS  (PRN):  dextrose 40% Gel 15 Gram(s) Oral once PRN Blood Glucose LESS THAN 70 milliGRAM(s)/deciliter  glucagon  Injectable 1 milliGRAM(s) IntraMuscular once PRN Glucose LESS THAN 70 milligrams/deciliter      ALLERGIES:  Allergies    No Known Allergies    Intolerances        LABS:                        11.3   15.51 )-----------( 71       ( 18 Apr 2020 07:49 )             36.1     04-18    147<H>  |  114<H>  |  47<H>  ----------------------------<  148<H>  5.7<H>   |  23  |  0.82    Ca    8.2<L>      18 Apr 2020 08:39  Phos  3.0     04-18  Mg     2.3     04-18    TPro  6.2  /  Alb  2.0<L>  /  TBili  0.3  /  DBili  x   /  AST  71<H>  /  ALT  68<H>  /  AlkPhos  66  04-18    PT/INR - ( 18 Apr 2020 07:49 )   PT: 14.0 sec;   INR: 1.22          PTT - ( 18 Apr 2020 07:49 )  PTT:32.0 sec    CAPILLARY BLOOD GLUCOSE      POCT Blood Glucose.: 116 mg/dL (18 Apr 2020 11:10)      RADIOLOGY & ADDITIONAL TESTS: Reviewed.

## 2020-04-18 NOTE — PROGRESS NOTE ADULT - PROBLEM SELECTOR PLAN 7
PLT 71 on admission likely in setting of COVID and acute infection as pt with normal plt in 07/2018.  -Continue to monitor for now and transfuse platelets if less than 10K or less than 50K w/ bleeding  -holding VTE ppx in setting of thrombocytopenia due to high risk of bleeding   -f/u fibrinogen, PT/PTT/INR for possible DIC    #elevated LFTs: likely in setting of covid, monitor LFTs, check hep panel PLT 71 on admission likely in setting of COVID and acute infection as pt with normal plt in 07/2018.  -Continue to monitor for now and transfuse platelets if less than 10K or less than 50K w/ bleeding  - will restart lovenox 40mg subQ for VTE ppx as plts resolving; 75 this AM via blue top   - fibrinogen elevated--not DIC

## 2020-04-18 NOTE — PROGRESS NOTE ADULT - ASSESSMENT
89yo Female, DNR/DNI (no abx or tube feeds), with PMHx of HTN, HLD, dCHF and generalized anxiety disorder who was brought to [facility] ED from [facility] home after pt was found to be hypoxic to the 80s on RA and hypotensive. Pt endorses generalized weakness x few weeks and SOB x few days per nursing facility note. Pt found to be COVID positive, RETA and dehydrated, pt now admitted to [facility] for further management.

## 2020-04-18 NOTE — PROGRESS NOTE ADULT - PROBLEM SELECTOR PLAN 10
F: LR @ 100cc/hr x 10 hrs  E: K >4, Mag >2  N: dysphagia; pureed and thin liquids per speech/swallow  GI: Famotidine 20mg daily  DVT ppx: None 2/2 thrombocytopenia  Code Status: DNR/DNI, confirmed NOT comfort care F: none  E: K >4, Mag >2  N: dysphagia; pureed and thin liquids per speech/swallow  GI: Famotidine 20mg daily  DVT ppx: lovenox 40mg subQ q24h  Code Status: DNR/DNI, confirmed NOT comfort care

## 2020-04-18 NOTE — PROGRESS NOTE ADULT - PROBLEM SELECTOR PLAN 4
RESOLVING  AOx0 on admission, nonverbal, baseline per friend and niece (Radha) a week ago, lucid and eating/drinking. AMS likely multifactorial including urosepsis and COVID and hypernatremia. Expect mental status to resume to baseline after treatment. AOx1-2 today  -s/p 500cc NS bolus and 500cc D5w bolus and D5w fluids @130cc/hr   -f/u urine culture  -mental status improving--more awake today, opening eyes, tracking, intermittently smiling and nodding    #poor po intake   - speech and swallow cleared for pureed and thin liquids

## 2020-04-18 NOTE — PROGRESS NOTE ADULT - PROBLEM SELECTOR PLAN 1
Meeting 2/4 SIRS criteria (hypothermia and WBC 21). lactate 2.9, cleared to 1.2 s/p NS bolus, likely in setting of decreased PO intake. U/A grossly positive. Pt urinating with primafit  - s/p 2 doses of IV CTX 1g q24h, however ESBL resistant to CTX; will switch to ertapenem  - blood cultures NGTD

## 2020-04-18 NOTE — CHART NOTE - NSCHARTNOTEFT_GEN_A_CORE
Infectious Diseases Anti-infective Approval Note    Medication: ertapenem  Dose: 1g  Route: IV  Frequency: q24h  Duration: 3 days    Dose may be adjusted as needed for alterations in renal function.    *THIS IS NOT AN INFECTIOUS DISEASES CONSULTATION*

## 2020-04-18 NOTE — PROGRESS NOTE ADULT - PROBLEM SELECTOR PLAN 6
RESOLVED   BUN/Cr 86/1.61 on admission, most likely 2/2 dehydration s/p NS IVF 500cc bolus in ED Cr improved to 1.45. Cr back to baseline 0.94  -f/u UA and urine lytes  -Continue to monitor and avoid nephrotoxic agents RESOLVED   BUN/Cr 86/1.61 on admission, most likely 2/2 dehydration s/p NS IVF 500cc bolus in ED Cr improved to 1.45. Cr back to baseline 0.94  -Continue to monitor and avoid nephrotoxic agents

## 2020-04-18 NOTE — PROGRESS NOTE ADULT - PROBLEM SELECTOR PLAN 5
RESOLVING  Na 159 on admission, s/p NS IVF 500cc bolus in ED. Likely 2/2 dehydration as evidenced by labs; 147 this AM  -s/p 500cc D5w bolus and D5w @100cc/hr --> 130cc/hr     #hyperkalemia   K 5.7 this AM. (Had been repleted for hypokalemia with total 80mEq K).   - kayexalate 15mg rectally   - recheck BMP 4pm

## 2020-04-19 LAB
ALBUMIN SERPL ELPH-MCNC: 2 G/DL — LOW (ref 3.3–5)
ALP SERPL-CCNC: 88 U/L — SIGNIFICANT CHANGE UP (ref 40–120)
ALT FLD-CCNC: 81 U/L — HIGH (ref 10–45)
ANION GAP SERPL CALC-SCNC: 14 MMOL/L — SIGNIFICANT CHANGE UP (ref 5–17)
AST SERPL-CCNC: 69 U/L — HIGH (ref 10–40)
BASOPHILS # BLD AUTO: 0.01 K/UL — SIGNIFICANT CHANGE UP (ref 0–0.2)
BASOPHILS NFR BLD AUTO: 0.1 % — SIGNIFICANT CHANGE UP (ref 0–2)
BILIRUB SERPL-MCNC: 0.4 MG/DL — SIGNIFICANT CHANGE UP (ref 0.2–1.2)
BUN SERPL-MCNC: 42 MG/DL — HIGH (ref 7–23)
CALCIUM SERPL-MCNC: 8.3 MG/DL — LOW (ref 8.4–10.5)
CHLORIDE SERPL-SCNC: 117 MMOL/L — HIGH (ref 96–108)
CO2 SERPL-SCNC: 20 MMOL/L — LOW (ref 22–31)
CREAT SERPL-MCNC: 0.67 MG/DL — SIGNIFICANT CHANGE UP (ref 0.5–1.3)
CRP SERPL-MCNC: 5.51 MG/DL — HIGH (ref 0–0.4)
D DIMER BLD IA.RAPID-MCNC: 3419 NG/ML DDU — HIGH
EOSINOPHIL # BLD AUTO: 0 K/UL — SIGNIFICANT CHANGE UP (ref 0–0.5)
EOSINOPHIL NFR BLD AUTO: 0 % — SIGNIFICANT CHANGE UP (ref 0–6)
FERRITIN SERPL-MCNC: 2064 NG/ML — HIGH (ref 15–150)
G6PD RBC-CCNC: 19.1 U/G HGB — SIGNIFICANT CHANGE UP (ref 7–20.5)
GLUCOSE BLDC GLUCOMTR-MCNC: 107 MG/DL — HIGH (ref 70–99)
GLUCOSE BLDC GLUCOMTR-MCNC: 75 MG/DL — SIGNIFICANT CHANGE UP (ref 70–99)
GLUCOSE BLDC GLUCOMTR-MCNC: 76 MG/DL — SIGNIFICANT CHANGE UP (ref 70–99)
GLUCOSE BLDC GLUCOMTR-MCNC: 78 MG/DL — SIGNIFICANT CHANGE UP (ref 70–99)
GLUCOSE SERPL-MCNC: 89 MG/DL — SIGNIFICANT CHANGE UP (ref 70–99)
HCT VFR BLD CALC: 46.6 % — HIGH (ref 34.5–45)
HGB BLD-MCNC: 14.1 G/DL — SIGNIFICANT CHANGE UP (ref 11.5–15.5)
IMM GRANULOCYTES NFR BLD AUTO: 1.2 % — SIGNIFICANT CHANGE UP (ref 0–1.5)
LYMPHOCYTES # BLD AUTO: 0.74 K/UL — LOW (ref 1–3.3)
LYMPHOCYTES # BLD AUTO: 7.5 % — LOW (ref 13–44)
MAGNESIUM SERPL-MCNC: 2.5 MG/DL — SIGNIFICANT CHANGE UP (ref 1.6–2.6)
MCHC RBC-ENTMCNC: 29.4 PG — SIGNIFICANT CHANGE UP (ref 27–34)
MCHC RBC-ENTMCNC: 30.3 GM/DL — LOW (ref 32–36)
MCV RBC AUTO: 97.3 FL — SIGNIFICANT CHANGE UP (ref 80–100)
MONOCYTES # BLD AUTO: 0.21 K/UL — SIGNIFICANT CHANGE UP (ref 0–0.9)
MONOCYTES NFR BLD AUTO: 2.1 % — SIGNIFICANT CHANGE UP (ref 2–14)
NEUTROPHILS # BLD AUTO: 8.81 K/UL — HIGH (ref 1.8–7.4)
NEUTROPHILS NFR BLD AUTO: 89.1 % — HIGH (ref 43–77)
NRBC # BLD: 0 /100 WBCS — SIGNIFICANT CHANGE UP (ref 0–0)
PHOSPHATE SERPL-MCNC: 4.1 MG/DL — SIGNIFICANT CHANGE UP (ref 2.5–4.5)
PLATELET # BLD AUTO: 103 K/UL — LOW (ref 150–400)
POTASSIUM SERPL-MCNC: 4.6 MMOL/L — SIGNIFICANT CHANGE UP (ref 3.5–5.3)
POTASSIUM SERPL-SCNC: 4.6 MMOL/L — SIGNIFICANT CHANGE UP (ref 3.5–5.3)
PROT SERPL-MCNC: 7.1 G/DL — SIGNIFICANT CHANGE UP (ref 6–8.3)
RBC # BLD: 4.79 M/UL — SIGNIFICANT CHANGE UP (ref 3.8–5.2)
RBC # FLD: 14.2 % — SIGNIFICANT CHANGE UP (ref 10.3–14.5)
SODIUM SERPL-SCNC: 151 MMOL/L — HIGH (ref 135–145)
WBC # BLD: 9.89 K/UL — SIGNIFICANT CHANGE UP (ref 3.8–10.5)
WBC # FLD AUTO: 9.89 K/UL — SIGNIFICANT CHANGE UP (ref 3.8–10.5)

## 2020-04-19 PROCEDURE — 99232 SBSQ HOSP IP/OBS MODERATE 35: CPT | Mod: CS

## 2020-04-19 PROCEDURE — 93970 EXTREMITY STUDY: CPT | Mod: 26

## 2020-04-19 RX ORDER — SODIUM CHLORIDE 9 MG/ML
1000 INJECTION, SOLUTION INTRAVENOUS
Refills: 0 | Status: DISCONTINUED | OUTPATIENT
Start: 2020-04-19 | End: 2020-04-20

## 2020-04-19 RX ORDER — ENOXAPARIN SODIUM 100 MG/ML
45 INJECTION SUBCUTANEOUS EVERY 24 HOURS
Refills: 0 | Status: DISCONTINUED | OUTPATIENT
Start: 2020-04-20 | End: 2020-04-20

## 2020-04-19 RX ORDER — MIRTAZAPINE 45 MG/1
7.5 TABLET, ORALLY DISINTEGRATING ORAL AT BEDTIME
Refills: 0 | Status: DISCONTINUED | OUTPATIENT
Start: 2020-04-19 | End: 2020-04-22

## 2020-04-19 RX ORDER — ENOXAPARIN SODIUM 100 MG/ML
40 INJECTION SUBCUTANEOUS EVERY 12 HOURS
Refills: 0 | Status: DISCONTINUED | OUTPATIENT
Start: 2020-04-19 | End: 2020-04-19

## 2020-04-19 RX ADMIN — Medication 1 DROP(S): at 17:56

## 2020-04-19 RX ADMIN — Medication 1 DROP(S): at 05:37

## 2020-04-19 RX ADMIN — SODIUM CHLORIDE 75 MILLILITER(S): 9 INJECTION, SOLUTION INTRAVENOUS at 10:28

## 2020-04-19 RX ADMIN — Medication 40 MILLIGRAM(S): at 05:37

## 2020-04-19 RX ADMIN — Medication 40 MILLIGRAM(S): at 18:06

## 2020-04-19 RX ADMIN — Medication 1 APPLICATION(S): at 17:56

## 2020-04-19 RX ADMIN — ERTAPENEM SODIUM 120 MILLIGRAM(S): 1 INJECTION, POWDER, LYOPHILIZED, FOR SOLUTION INTRAMUSCULAR; INTRAVENOUS at 10:29

## 2020-04-19 RX ADMIN — MIRTAZAPINE 7.5 MILLIGRAM(S): 45 TABLET, ORALLY DISINTEGRATING ORAL at 22:48

## 2020-04-19 RX ADMIN — ENOXAPARIN SODIUM 40 MILLIGRAM(S): 100 INJECTION SUBCUTANEOUS at 10:58

## 2020-04-19 RX ADMIN — Medication 1 APPLICATION(S): at 05:37

## 2020-04-19 NOTE — PROGRESS NOTE ADULT - PROBLEM SELECTOR PLAN 7
PLT 71 on admission likely in setting of COVID and acute infection as pt with normal plt in 07/2018.  -Continue to monitor for now and transfuse platelets if less than 10K or less than 50K w/ bleeding  - will restart lovenox 40mg subQ for VTE ppx as plts resolving; 75 this AM via blue top   - fibrinogen elevated--not DIC earn[place] 2/2 acute infection-- also improved to 100 after holding famotidine, azithro  -Continue to monitor for now and transfuse platelets if less than 10K or less than 50K w/ bleeding  -c/w lovenox ppx

## 2020-04-19 NOTE — PROGRESS NOTE ADULT - PROBLEM SELECTOR PLAN 6
RESOLVED   BUN/Cr 86/1.61 on admission, most likely 2/2 dehydration s/p NS IVF 500cc bolus in ED Cr improved to 1.45. Cr back to baseline 0.94  -Continue to monitor and avoid nephrotoxic agents

## 2020-04-19 NOTE — PROGRESS NOTE ADULT - PROBLEM SELECTOR PLAN 3
Sepsis 2/2 UTI due to ESBL Ecoli c/b metabolic encephalopathy   - s/p 2 doses of IV CTX 1g q24h, however ESBL resistant to CTX; will switch to ertapenem 1g q24h   - will discharge to rehab on Bactrim   - blood cultures NGTD see above under sepsis

## 2020-04-19 NOTE — PROGRESS NOTE ADULT - ASSESSMENT
89yo Female, DNR/DNI (no abx or tube feeds), with PMHx of HTN, HLD, dCHF and generalized anxiety disorder who was brought to [facility] ED from [facility] home after pt was found to be hypoxic to the 80s on RA and hypotensive. Pt endorses generalized weakness x few weeks and SOB x few days per nursing facility note. Pt found to be COVID positive, RETA and dehydrated, pt now admitted to [facility] for further management. 89 F DNR/DNI (no abx or tube feeds), PMH HTN, HLD, dCHF and LOLA from [facility], admitted for AMS and covid hypoxia. Now on RA, MS improving w/ UTI tx and IVF.

## 2020-04-19 NOTE — PROGRESS NOTE ADULT - PROBLEM SELECTOR PLAN 5
RESOLVING  Na 159 on admission, s/p NS IVF 500cc bolus in ED. Likely 2/2 dehydration as evidenced by labs; 147 this AM  -s/p 500cc D5w bolus and D5w @100cc/hr --> 130cc/hr     #hyperkalemia   K 5.7 this AM. (Had been repleted for hypokalemia with total 80mEq K).   - kayexalate 15mg rectally   - recheck BMP 4pm RESOLVING  Na 159 on admission,s/p d5-- improved, now back up to 151  -restart d5    #hyperkalemia -- resolved  K 5.7 this AM. (Had been repleted for hypokalemia with total 80mEq K). s/p kayexalate 15mg rectally  w/ improvement  -monitor K

## 2020-04-19 NOTE — PROGRESS NOTE ADULT - SUBJECTIVE AND OBJECTIVE BOX
VITAL SIGNS:  Vital Signs Last 24 Hrs  T(C): 37.1 (19 Apr 2020 05:41), Max: 37.1 (19 Apr 2020 05:41)  T(F): 98.8 (19 Apr 2020 05:41), Max: 98.8 (19 Apr 2020 05:41)  HR: 72 (19 Apr 2020 05:41) (59 - 72)  BP: 144/78 (19 Apr 2020 05:41) (130/81 - 144/78)  BP(mean): --  RR: 17 (19 Apr 2020 05:41) (16 - 17)  SpO2: 96% (19 Apr 2020 05:41) (96% - 100%)                MEDICATIONS:  MEDICATIONS  (STANDING):  ammonium lactate 12% Lotion 1 Application(s) Topical two times a day  artificial  tears Solution 1 Drop(s) Both EYES two times a day  dextrose 5%. 1000 milliLiter(s) (75 mL/Hr) IV Continuous <Continuous>  dextrose 5%. 1000 milliLiter(s) (50 mL/Hr) IV Continuous <Continuous>  dextrose 50% Injectable 12.5 Gram(s) IV Push once  dextrose 50% Injectable 25 Gram(s) IV Push once  dextrose 50% Injectable 25 Gram(s) IV Push once  enoxaparin Injectable 40 milliGRAM(s) SubCutaneous every 24 hours  ertapenem  IVPB 1000 milliGRAM(s) IV Intermittent every 24 hours  insulin lispro (HumaLOG) corrective regimen sliding scale   SubCutaneous Before meals and at bedtime  methylPREDNISolone sodium succinate Injectable 40 milliGRAM(s) IV Push every 12 hours    MEDICATIONS  (PRN):  dextrose 40% Gel 15 Gram(s) Oral once PRN Blood Glucose LESS THAN 70 milliGRAM(s)/deciliter  glucagon  Injectable 1 milliGRAM(s) IntraMuscular once PRN Glucose LESS THAN 70 milligrams/deciliter      ALLERGIES:  Allergies    No Known Allergies    Intolerances        LABS:                        14.1   9.89  )-----------( 103      ( 19 Apr 2020 08:19 )             46.6     04-19    151<H>  |  117<H>  |  42<H>  ----------------------------<  89  4.6   |  20<L>  |  0.67    Ca    8.3<L>      19 Apr 2020 08:19  Phos  4.1     04-19  Mg     2.5     04-19    TPro  7.1  /  Alb  2.0<L>  /  TBili  0.4  /  DBili  x   /  AST  69<H>  /  ALT  81<H>  /  AlkPhos  88  04-19    PT/INR - ( 18 Apr 2020 07:49 )   PT: 14.0 sec;   INR: 1.22          PTT - ( 18 Apr 2020 07:49 )  PTT:32.0 sec  Fingerstick  glucose: POCT Blood Glucose.: 76 mg/dL (19 Apr 2020 08:44)      RADIOLOGY & ADDITIONAL TESTS: Reviewed. NAEO:  interval events: aaxo2, na elevated 151--> d5, k nl, dimer downtrended, ordered dopplers, reduced po intake  S: no complaints    VITAL SIGNS:  Vital Signs Last 24 Hrs  T(C): 37.1 (19 Apr 2020 05:41), Max: 37.1 (19 Apr 2020 05:41)  T(F): 98.8 (19 Apr 2020 05:41), Max: 98.8 (19 Apr 2020 05:41)  HR: 72 (19 Apr 2020 05:41) (59 - 72)  BP: 144/78 (19 Apr 2020 05:41) (130/81 - 144/78)  BP(mean): --  RR: 17 (19 Apr 2020 05:41) (16 - 17)  SpO2: 96% (19 Apr 2020 05:41) (96% - 100%)      PHYSICAL EXAM:  General: chronically ill appearing frail elderly female, speaking intermittently, AOx2  HEENT: NC/AT  Respiratory: no increased work of breathing, breathing comfortably on RA; saturating 98%  Gastrointestinal: soft, NT/ND  : primafit in place draining yellow-orange urine  Extremities: WWP; no edema, chronic vascular changes bilaterally up to knees with scaling, no purulence or exudates  Neurological: speaking intermittently, AOx1-2            MEDICATIONS:  MEDICATIONS  (STANDING):  ammonium lactate 12% Lotion 1 Application(s) Topical two times a day  artificial  tears Solution 1 Drop(s) Both EYES two times a day  dextrose 5%. 1000 milliLiter(s) (75 mL/Hr) IV Continuous <Continuous>  dextrose 5%. 1000 milliLiter(s) (50 mL/Hr) IV Continuous <Continuous>  dextrose 50% Injectable 12.5 Gram(s) IV Push once  dextrose 50% Injectable 25 Gram(s) IV Push once  dextrose 50% Injectable 25 Gram(s) IV Push once  enoxaparin Injectable 40 milliGRAM(s) SubCutaneous every 24 hours  ertapenem  IVPB 1000 milliGRAM(s) IV Intermittent every 24 hours  insulin lispro (HumaLOG) corrective regimen sliding scale   SubCutaneous Before meals and at bedtime  methylPREDNISolone sodium succinate Injectable 40 milliGRAM(s) IV Push every 12 hours    MEDICATIONS  (PRN):  dextrose 40% Gel 15 Gram(s) Oral once PRN Blood Glucose LESS THAN 70 milliGRAM(s)/deciliter  glucagon  Injectable 1 milliGRAM(s) IntraMuscular once PRN Glucose LESS THAN 70 milligrams/deciliter      ALLERGIES:  Allergies    No Known Allergies    Intolerances        LABS:                        14.1   9.89  )-----------( 103      ( 19 Apr 2020 08:19 )             46.6     04-19    151<H>  |  117<H>  |  42<H>  ----------------------------<  89  4.6   |  20<L>  |  0.67    Ca    8.3<L>      19 Apr 2020 08:19  Phos  4.1     04-19  Mg     2.5     04-19    TPro  7.1  /  Alb  2.0<L>  /  TBili  0.4  /  DBili  x   /  AST  69<H>  /  ALT  81<H>  /  AlkPhos  88  04-19    PT/INR - ( 18 Apr 2020 07:49 )   PT: 14.0 sec;   INR: 1.22          PTT - ( 18 Apr 2020 07:49 )  PTT:32.0 sec  Fingerstick  glucose: POCT Blood Glucose.: 76 mg/dL (19 Apr 2020 08:44)      RADIOLOGY & ADDITIONAL TESTS: Reviewed.

## 2020-04-19 NOTE — PROGRESS NOTE ADULT - PROBLEM SELECTOR PLAN 4
RESOLVING  AOx0 on admission, nonverbal, baseline per friend and niece (Radha) a week ago, lucid and eating/drinking. AMS likely multifactorial including urosepsis and COVID and hypernatremia. Expect mental status to resume to baseline after treatment. AOx1-2 today  -s/p 500cc NS bolus and 500cc D5w bolus and D5w fluids @130cc/hr   -f/u urine culture  -mental status improving--more awake today, opening eyes, tracking, intermittently smiling and nodding    #poor po intake   - speech and swallow cleared for pureed and thin liquids RESOLVING  AOx0 on admission, nonverbal, --baseline per friend and niece (Radha) a week ago, lucid and eating/drinking and aaxo3 at baseline. AMS likely multifactorial including urosepsis and COVID and hypernatremia. Expect mental status to resume to baseline after treatment. AOx1-2 today, cth neg on admission  -c/w d5  -c/w abx for uti  -monitor MS    #poor po intake   - speech and swallow cleared for pureed and thin liquids

## 2020-04-19 NOTE — PROGRESS NOTE ADULT - PROBLEM SELECTOR PLAN 2
Presented with few weeks of generalized weakness and SOB; At [facility] pt found to be hypoxic to the 80s, now Saturating 98% on RA  - COVID+, contact droplet isolation  - inflammatory markers: ddimer 3667, CRP 25.72, Ferritin 3048; procalcitonin 0.79, lactate 2.9, WBC 21.11 with left shift  -QTC on admission 475  -CXR revealed clear lungs  -confirmed with HCP that previous "no abx" stated on previous MOLST will be reversed   -continue treatment with IV azithromycin 250mg x 5 days (end 4/20)  -c/w solu-medrol 40mg IV BID x 5 days (end 4/20)  -Continue to monitor daily CBC/CMP/Mg/PO4/CRP/Ferritin/D-Dimer  -Monitor O2 saturation, monitor for respiratory distress; NC as needed, avoid HFNC/BiPAP ccovid +, started tx w/ azithro but on RA so dcd (and low plts no improving), crp down  - COVID+, contact droplet isolation  -if hypoxic restart azithro and start plaquenil  -c/w solu-medrol 40mg IV BID x 5 days (end 4/20)  -Continue to monitor daily CBC/CMP/Mg/PO4/CRP/Ferritin/D-Dimer  -Monitor O2   -covuid isolation  -ddimer high-- fu dopplers

## 2020-04-19 NOTE — PROGRESS NOTE ADULT - PROBLEM SELECTOR PLAN 1
Meeting 2/4 SIRS criteria (hypothermia and WBC 21). lactate 2.9, cleared to 1.2 s/p NS bolus, likely in setting of decreased PO intake. U/A grossly positive. Pt urinating with primafit  - s/p 2 doses of IV CTX 1g q24h, however ESBL resistant to CTX; will switch to ertapenem  - blood cultures NGTD 2/2 UTI esbli ecoli---> started on ertapenam 4/18 w/ wbc improvement  -monitor wbc  -c/w ertapenam--> dc on bactrim (also sens)  - blood cultures NGTD

## 2020-04-19 NOTE — PROGRESS NOTE ADULT - PROBLEM SELECTOR PLAN 10
F: none  E: K >4, Mag >2  N: dysphagia; pureed and thin liquids per speech/swallow  GI: Famotidine 20mg daily  DVT ppx: lovenox 40mg subQ q24h  Code Status: DNR/DNI, confirmed NOT comfort care

## 2020-04-19 NOTE — PROGRESS NOTE ADULT - PROBLEM SELECTOR PLAN 9
Plan: hx of LOLA  -Hold home Mirtazapine 5mg qHS. Plan: hx of LOLA  -Holding home Mirtazapine 5mg qHS Plan: hx of LOLA  -restart home mirtazapine today dose 7.5qhs

## 2020-04-20 LAB
ALBUMIN SERPL ELPH-MCNC: 2.2 G/DL — LOW (ref 3.3–5)
ALP SERPL-CCNC: 70 U/L — SIGNIFICANT CHANGE UP (ref 40–120)
ALT FLD-CCNC: 65 U/L — HIGH (ref 10–45)
ANION GAP SERPL CALC-SCNC: 12 MMOL/L — SIGNIFICANT CHANGE UP (ref 5–17)
AST SERPL-CCNC: 42 U/L — HIGH (ref 10–40)
BASOPHILS # BLD AUTO: 0.01 K/UL — SIGNIFICANT CHANGE UP (ref 0–0.2)
BASOPHILS NFR BLD AUTO: 0.1 % — SIGNIFICANT CHANGE UP (ref 0–2)
BILIRUB SERPL-MCNC: 0.4 MG/DL — SIGNIFICANT CHANGE UP (ref 0.2–1.2)
BUN SERPL-MCNC: 45 MG/DL — HIGH (ref 7–23)
CALCIUM SERPL-MCNC: 8.5 MG/DL — SIGNIFICANT CHANGE UP (ref 8.4–10.5)
CHLORIDE SERPL-SCNC: 114 MMOL/L — HIGH (ref 96–108)
CO2 SERPL-SCNC: 22 MMOL/L — SIGNIFICANT CHANGE UP (ref 22–31)
CREAT SERPL-MCNC: 0.83 MG/DL — SIGNIFICANT CHANGE UP (ref 0.5–1.3)
CRP SERPL-MCNC: 3.63 MG/DL — HIGH (ref 0–0.4)
CULTURE RESULTS: NO GROWTH — SIGNIFICANT CHANGE UP
CULTURE RESULTS: NO GROWTH — SIGNIFICANT CHANGE UP
D DIMER BLD IA.RAPID-MCNC: 2185 NG/ML DDU — HIGH
EOSINOPHIL # BLD AUTO: 0 K/UL — SIGNIFICANT CHANGE UP (ref 0–0.5)
EOSINOPHIL NFR BLD AUTO: 0 % — SIGNIFICANT CHANGE UP (ref 0–6)
FERRITIN SERPL-MCNC: 1053 NG/ML — HIGH (ref 15–150)
GLUCOSE BLDC GLUCOMTR-MCNC: 101 MG/DL — HIGH (ref 70–99)
GLUCOSE BLDC GLUCOMTR-MCNC: 119 MG/DL — HIGH (ref 70–99)
GLUCOSE BLDC GLUCOMTR-MCNC: 136 MG/DL — HIGH (ref 70–99)
GLUCOSE BLDC GLUCOMTR-MCNC: 143 MG/DL — HIGH (ref 70–99)
GLUCOSE BLDC GLUCOMTR-MCNC: 58 MG/DL — LOW (ref 70–99)
GLUCOSE SERPL-MCNC: 129 MG/DL — HIGH (ref 70–99)
HCT VFR BLD CALC: 44.7 % — SIGNIFICANT CHANGE UP (ref 34.5–45)
HGB BLD-MCNC: 13.8 G/DL — SIGNIFICANT CHANGE UP (ref 11.5–15.5)
IMM GRANULOCYTES NFR BLD AUTO: 1.5 % — SIGNIFICANT CHANGE UP (ref 0–1.5)
LYMPHOCYTES # BLD AUTO: 1.05 K/UL — SIGNIFICANT CHANGE UP (ref 1–3.3)
LYMPHOCYTES # BLD AUTO: 9.6 % — LOW (ref 13–44)
MAGNESIUM SERPL-MCNC: 2.5 MG/DL — SIGNIFICANT CHANGE UP (ref 1.6–2.6)
MCHC RBC-ENTMCNC: 29.3 PG — SIGNIFICANT CHANGE UP (ref 27–34)
MCHC RBC-ENTMCNC: 30.9 GM/DL — LOW (ref 32–36)
MCV RBC AUTO: 94.9 FL — SIGNIFICANT CHANGE UP (ref 80–100)
MONOCYTES # BLD AUTO: 0.26 K/UL — SIGNIFICANT CHANGE UP (ref 0–0.9)
MONOCYTES NFR BLD AUTO: 2.4 % — SIGNIFICANT CHANGE UP (ref 2–14)
NEUTROPHILS # BLD AUTO: 9.48 K/UL — HIGH (ref 1.8–7.4)
NEUTROPHILS NFR BLD AUTO: 86.4 % — HIGH (ref 43–77)
NRBC # BLD: 0 /100 WBCS — SIGNIFICANT CHANGE UP (ref 0–0)
PHOSPHATE SERPL-MCNC: 4.4 MG/DL — SIGNIFICANT CHANGE UP (ref 2.5–4.5)
PLATELET # BLD AUTO: 74 K/UL — LOW (ref 150–400)
POTASSIUM SERPL-MCNC: 4.3 MMOL/L — SIGNIFICANT CHANGE UP (ref 3.5–5.3)
POTASSIUM SERPL-SCNC: 4.3 MMOL/L — SIGNIFICANT CHANGE UP (ref 3.5–5.3)
PROT SERPL-MCNC: 6.7 G/DL — SIGNIFICANT CHANGE UP (ref 6–8.3)
RBC # BLD: 4.71 M/UL — SIGNIFICANT CHANGE UP (ref 3.8–5.2)
RBC # FLD: 14 % — SIGNIFICANT CHANGE UP (ref 10.3–14.5)
SODIUM SERPL-SCNC: 148 MMOL/L — HIGH (ref 135–145)
SPECIMEN SOURCE: SIGNIFICANT CHANGE UP
SPECIMEN SOURCE: SIGNIFICANT CHANGE UP
WBC # BLD: 10.96 K/UL — HIGH (ref 3.8–10.5)
WBC # FLD AUTO: 10.96 K/UL — HIGH (ref 3.8–10.5)

## 2020-04-20 PROCEDURE — 99233 SBSQ HOSP IP/OBS HIGH 50: CPT | Mod: CS,GC

## 2020-04-20 RX ORDER — DEXTROSE 50 % IN WATER 50 %
25 SYRINGE (ML) INTRAVENOUS ONCE
Refills: 0 | Status: DISCONTINUED | OUTPATIENT
Start: 2020-04-20 | End: 2020-04-20

## 2020-04-20 RX ORDER — SODIUM CHLORIDE 9 MG/ML
1000 INJECTION, SOLUTION INTRAVENOUS
Refills: 0 | Status: DISCONTINUED | OUTPATIENT
Start: 2020-04-20 | End: 2020-04-20

## 2020-04-20 RX ORDER — APIXABAN 2.5 MG/1
2.5 TABLET, FILM COATED ORAL EVERY 12 HOURS
Refills: 0 | Status: DISCONTINUED | OUTPATIENT
Start: 2020-04-21 | End: 2020-04-23

## 2020-04-20 RX ORDER — DEXTROSE 50 % IN WATER 50 %
50 SYRINGE (ML) INTRAVENOUS ONCE
Refills: 0 | Status: COMPLETED | OUTPATIENT
Start: 2020-04-20 | End: 2020-04-20

## 2020-04-20 RX ORDER — SODIUM CHLORIDE 9 MG/ML
1000 INJECTION, SOLUTION INTRAVENOUS
Refills: 0 | Status: DISCONTINUED | OUTPATIENT
Start: 2020-04-20 | End: 2020-04-21

## 2020-04-20 RX ADMIN — Medication 40 MILLIGRAM(S): at 06:03

## 2020-04-20 RX ADMIN — ERTAPENEM SODIUM 120 MILLIGRAM(S): 1 INJECTION, POWDER, LYOPHILIZED, FOR SOLUTION INTRAMUSCULAR; INTRAVENOUS at 10:10

## 2020-04-20 RX ADMIN — SODIUM CHLORIDE 60 MILLILITER(S): 9 INJECTION, SOLUTION INTRAVENOUS at 13:06

## 2020-04-20 RX ADMIN — Medication 1 APPLICATION(S): at 06:03

## 2020-04-20 RX ADMIN — Medication 1 DROP(S): at 06:03

## 2020-04-20 RX ADMIN — Medication 40 MILLIGRAM(S): at 17:40

## 2020-04-20 RX ADMIN — MIRTAZAPINE 7.5 MILLIGRAM(S): 45 TABLET, ORALLY DISINTEGRATING ORAL at 21:12

## 2020-04-20 RX ADMIN — SODIUM CHLORIDE 50 MILLILITER(S): 9 INJECTION, SOLUTION INTRAVENOUS at 12:28

## 2020-04-20 RX ADMIN — ENOXAPARIN SODIUM 45 MILLIGRAM(S): 100 INJECTION SUBCUTANEOUS at 10:10

## 2020-04-20 RX ADMIN — Medication 50 MILLILITER(S): at 13:06

## 2020-04-20 RX ADMIN — Medication 1 DROP(S): at 17:27

## 2020-04-20 RX ADMIN — Medication 1 APPLICATION(S): at 17:27

## 2020-04-20 NOTE — PROGRESS NOTE ADULT - PROBLEM SELECTOR PLAN 1
Meeting 2/4 SIRS criteria (hypothermia and WBC 21). lactate 2.9, cleared to 1.2 s/p NS bolus, likely in setting of decreased PO intake. U/A grossly positive. Pt urinating with primafit  - s/p 2 doses of IV CTX 1g q24h, however ESBL resistant to CTX; switched to ertapenem 4/18  - blood cultures NGTD

## 2020-04-20 NOTE — PROGRESS NOTE ADULT - ASSESSMENT
89yo Female, DNR/DNI (no abx or tube feeds), with PMHx of HTN, HLD, dCHF and generalized anxiety disorder who was brought to [facility] ED from [facility] after pt was found to be hypoxic to the 80s on RA and hypotensive.COVID+, RETA and dehydrated, pt now admitted to [facility] for further management.

## 2020-04-20 NOTE — PROGRESS NOTE ADULT - PROBLEM SELECTOR PLAN 7
PLT 71 on admission likely in setting of COVID and acute infection as pt with normal plt in 07/2018.  -Continue to monitor for now and transfuse platelets if less than 10K or less than 50K w/ bleeding  - continue lovenox 45mg q24h   - fibrinogen elevated--not DIC

## 2020-04-20 NOTE — PROGRESS NOTE ADULT - PROBLEM SELECTOR PLAN 5
RESOLVING  AOx0 on admission, nonverbal, baseline per friend and niece (Radha) a week ago, lucid and eating/drinking. AMS likely multifactorial including urosepsis and COVID and hypernatremia. AOx1-2 today  -mental status improving--more awake today, opening eyes, tracking, intermittently smiling and nodding  -continue tx COVID, hypernatremia, and urosepsis    #poor po intake   - speech and swallow cleared for pureed and thin liquids RESOLVING  AOx0 on admission, nonverbal, baseline per friend and niece (Radha) a week ago, lucid and eating/drinking. AMS likely multifactorial including urosepsis and COVID and hypernatremia. AOx1-2 today  -mental status improving--more awake today, opening eyes, tracking, intermittently smiling and nodding  -continue tx COVID, hypernatremia, and urosepsis  -ADDENDUM: pt w/ metabolic encephalopathy likely 2/2 COVID, dehydration, hypernatremia, and UTI; expect improvement with continued management    #poor po intake   - speech and swallow cleared for pureed and thin liquids

## 2020-04-20 NOTE — PROGRESS NOTE ADULT - SUBJECTIVE AND OBJECTIVE BOX
OVERNIGHT EVENTS: NAEO    SUBJECTIVE / INTERVAL HPI: Patient seen and examined at bedside. IV replaced by ultrasound yesterday; LE ultrasound positive for b/l DVTs. Reports that she is "fine" this morning and responds to name, but only intermittently participatory in interview this AM, sleeping.     VITAL SIGNS:  Vital Signs Last 24 Hrs  T(C): 36.2 (19 Apr 2020 21:18), Max: 36.4 (19 Apr 2020 15:30)  T(F): 97.1 (19 Apr 2020 21:18), Max: 97.6 (19 Apr 2020 15:30)  HR: 77 (20 Apr 2020 06:30) (70 - 84)  BP: 154/94 (20 Apr 2020 06:30) (138/87 - 154/94)  BP(mean): --  RR: 19 (20 Apr 2020 06:30) (18 - 19)  SpO2: 95% (19 Apr 2020 21:18) (95% - 95%)    PHYSICAL EXAM:    General: chronically ill appearing frail elderly female, speaking intermittently, AOx1-2  HEENT: NC/AT; PERRL, anicteric sclera; poor oral hygiene, dry lips  Respiratory: no increased work of breathing, breathing comfortably on RA; saturating 98%  Gastrointestinal: soft, NT/ND  : primafit in place draining yellow-orange urine  Extremities: WWP; no edema, chronic vascular changes bilaterally up to knees with scaling, no purulence or exudates  Neurological: speaking intermittently, AOx1-2    MEDICATIONS:  MEDICATIONS  (STANDING):  ammonium lactate 12% Lotion 1 Application(s) Topical two times a day  artificial  tears Solution 1 Drop(s) Both EYES two times a day  dextrose 5%. 1000 milliLiter(s) (75 mL/Hr) IV Continuous <Continuous>  dextrose 5%. 1000 milliLiter(s) (75 mL/Hr) IV Continuous <Continuous>  dextrose 5%. 1000 milliLiter(s) (50 mL/Hr) IV Continuous <Continuous>  dextrose 50% Injectable 12.5 Gram(s) IV Push once  dextrose 50% Injectable 25 Gram(s) IV Push once  dextrose 50% Injectable 25 Gram(s) IV Push once  enoxaparin Injectable 45 milliGRAM(s) SubCutaneous every 24 hours  ertapenem  IVPB 1000 milliGRAM(s) IV Intermittent every 24 hours  insulin lispro (HumaLOG) corrective regimen sliding scale   SubCutaneous Before meals and at bedtime  methylPREDNISolone sodium succinate Injectable 40 milliGRAM(s) IV Push every 12 hours  mirtazapine 7.5 milliGRAM(s) Oral at bedtime    MEDICATIONS  (PRN):  dextrose 40% Gel 15 Gram(s) Oral once PRN Blood Glucose LESS THAN 70 milliGRAM(s)/deciliter  glucagon  Injectable 1 milliGRAM(s) IntraMuscular once PRN Glucose LESS THAN 70 milligrams/deciliter      ALLERGIES:  Allergies    No Known Allergies    Intolerances        LABS:                        13.8   10.96 )-----------( 74       ( 20 Apr 2020 06:59 )             44.7     04-20    148<H>  |  114<H>  |  45<H>  ----------------------------<  129<H>  4.3   |  22  |  0.83    Ca    8.5      20 Apr 2020 06:59  Phos  4.4     04-20  Mg     2.5     04-20    TPro  6.7  /  Alb  2.2<L>  /  TBili  0.4  /  DBili  x   /  AST  42<H>  /  ALT  65<H>  /  AlkPhos  70  04-20        CAPILLARY BLOOD GLUCOSE      POCT Blood Glucose.: 101 mg/dL (20 Apr 2020 08:29)      RADIOLOGY & ADDITIONAL TESTS: Reviewed.

## 2020-04-20 NOTE — PROGRESS NOTE ADULT - ATTENDING COMMENTS
Patient with stable respiratory status on room air; currently receiving ertapenem for sepsis 2/2 UTI. Will transition to bactrim upon improved renal function. Elevated BUN and uptrending sodium likely 2/2 dehydration as pt w/ poor PO intake. Will initiate D5 gtt 60cc/hr x12 hours and continue to monitor. Starting eliquis bid for b/l DVT. Patient with stable respiratory status on room air; currently receiving ertapenem for sepsis 2/2 UTI. Will transition to bactrim upon improved renal function. Elevated BUN and uptrending sodium likely 2/2 dehydration as pt w/ poor PO intake. Will initiate D5 gtt 60cc/hr x12 hours and continue to monitor. Starting eliquis bid for b/l DVT.    I have personally seen, examined, and participated in the care of this patient. I have reviewed all pertinent clinical information, including history, physical exam, plan, and the note and agree except as noted.

## 2020-04-20 NOTE — PROGRESS NOTE ADULT - PROBLEM SELECTOR PLAN 2
Presented with few weeks of generalized weakness and SOB; At [facility] pt found to be hypoxic to the 80s, now Saturating 98% on RA  - COVID+, contact droplet isolation  - inflammatory markers: ddimer 3667, CRP 25.72, Ferritin 3048; procalcitonin 0.79, lactate 2.9, WBC 21.11 with left shift  -QTC on admission 475  -CXR revealed clear lungs  -confirmed with HCP that previous "no abx" stated on previous MOLST will be reversed   -continue treatment with IV azithromycin 250mg x 5 days (end 4/20)  -c/w solu-medrol 40mg IV BID x 5 days (end 4/20)  -Continue to monitor daily CBC/CMP/Mg/PO4/CRP/Ferritin/D-Dimer  -saturating on RA 94%    #hypercoagulable state 2/2 COVID; b/l DVT +  LE dopplers positive for b/l DVTs   - treat with lovenox 45mg q24h (treatment dose q24h for low CrCl), will switch to eliquis 2.5mg BID

## 2020-04-20 NOTE — PROGRESS NOTE ADULT - PROBLEM SELECTOR PLAN 4
Na 159 on admission, s/p NS IVF 500cc bolus in ED. Likely 2/2 dehydration as evidenced by labs; 148 this AM  -will continue D5W @75cc/hr x 12 hours for hypernatremia likely 2/2 poor po intake

## 2020-04-20 NOTE — PROGRESS NOTE ADULT - PROBLEM SELECTOR PLAN 3
Sepsis 2/2 UTI due to ESBL Ecoli c/b metabolic encephalopathy   - s/p 2 doses of IV CTX 1g q24h, however ESBL resistant to CTX; switched to IV ertapenem 1g q24h 4/18  - will discharge to rehab on Bactrim   - blood cultures NGTD Sepsis 2/2 UTI due to ESBL Ecoli c/b metabolic encephalopathy   - s/p 2 doses of IV CTX 1g q24h, however ESBL resistant to CTX; switched to IV ertapenem 1g q24h 4/18  - will discharge to rehab on Bactrim pending renal improvement  - blood cultures NGTD

## 2020-04-21 LAB
ALBUMIN SERPL ELPH-MCNC: 2.3 G/DL — LOW (ref 3.3–5)
ALP SERPL-CCNC: 61 U/L — SIGNIFICANT CHANGE UP (ref 40–120)
ALT FLD-CCNC: 59 U/L — HIGH (ref 10–45)
ANION GAP SERPL CALC-SCNC: 12 MMOL/L — SIGNIFICANT CHANGE UP (ref 5–17)
AST SERPL-CCNC: 50 U/L — HIGH (ref 10–40)
BASOPHILS # BLD AUTO: 0.01 K/UL — SIGNIFICANT CHANGE UP (ref 0–0.2)
BASOPHILS NFR BLD AUTO: 0.1 % — SIGNIFICANT CHANGE UP (ref 0–2)
BILIRUB SERPL-MCNC: 0.4 MG/DL — SIGNIFICANT CHANGE UP (ref 0.2–1.2)
BUN SERPL-MCNC: 48 MG/DL — HIGH (ref 7–23)
CALCIUM SERPL-MCNC: 8.3 MG/DL — LOW (ref 8.4–10.5)
CHLORIDE SERPL-SCNC: 114 MMOL/L — HIGH (ref 96–108)
CO2 SERPL-SCNC: 21 MMOL/L — LOW (ref 22–31)
CREAT SERPL-MCNC: 0.68 MG/DL — SIGNIFICANT CHANGE UP (ref 0.5–1.3)
CRP SERPL-MCNC: 2.28 MG/DL — HIGH (ref 0–0.4)
D DIMER BLD IA.RAPID-MCNC: 2122 NG/ML DDU — HIGH
EOSINOPHIL # BLD AUTO: 0 K/UL — SIGNIFICANT CHANGE UP (ref 0–0.5)
EOSINOPHIL NFR BLD AUTO: 0 % — SIGNIFICANT CHANGE UP (ref 0–6)
FERRITIN SERPL-MCNC: 1167 NG/ML — HIGH (ref 15–150)
GLUCOSE BLDC GLUCOMTR-MCNC: 138 MG/DL — HIGH (ref 70–99)
GLUCOSE BLDC GLUCOMTR-MCNC: 217 MG/DL — HIGH (ref 70–99)
GLUCOSE BLDC GLUCOMTR-MCNC: 62 MG/DL — LOW (ref 70–99)
GLUCOSE BLDC GLUCOMTR-MCNC: 93 MG/DL — SIGNIFICANT CHANGE UP (ref 70–99)
GLUCOSE SERPL-MCNC: 95 MG/DL — SIGNIFICANT CHANGE UP (ref 70–99)
HCT VFR BLD CALC: 41.6 % — SIGNIFICANT CHANGE UP (ref 34.5–45)
HGB BLD-MCNC: 12.9 G/DL — SIGNIFICANT CHANGE UP (ref 11.5–15.5)
IMM GRANULOCYTES NFR BLD AUTO: 1.1 % — SIGNIFICANT CHANGE UP (ref 0–1.5)
LYMPHOCYTES # BLD AUTO: 0.78 K/UL — LOW (ref 1–3.3)
LYMPHOCYTES # BLD AUTO: 8.7 % — LOW (ref 13–44)
MAGNESIUM SERPL-MCNC: 2.5 MG/DL — SIGNIFICANT CHANGE UP (ref 1.6–2.6)
MCHC RBC-ENTMCNC: 28.9 PG — SIGNIFICANT CHANGE UP (ref 27–34)
MCHC RBC-ENTMCNC: 31 GM/DL — LOW (ref 32–36)
MCV RBC AUTO: 93.3 FL — SIGNIFICANT CHANGE UP (ref 80–100)
MONOCYTES # BLD AUTO: 0.18 K/UL — SIGNIFICANT CHANGE UP (ref 0–0.9)
MONOCYTES NFR BLD AUTO: 2 % — SIGNIFICANT CHANGE UP (ref 2–14)
NEUTROPHILS # BLD AUTO: 7.87 K/UL — HIGH (ref 1.8–7.4)
NEUTROPHILS NFR BLD AUTO: 88.1 % — HIGH (ref 43–77)
NRBC # BLD: 0 /100 WBCS — SIGNIFICANT CHANGE UP (ref 0–0)
PHOSPHATE SERPL-MCNC: 3.9 MG/DL — SIGNIFICANT CHANGE UP (ref 2.5–4.5)
PLATELET # BLD AUTO: 115 K/UL — LOW (ref 150–400)
POTASSIUM SERPL-MCNC: 4 MMOL/L — SIGNIFICANT CHANGE UP (ref 3.5–5.3)
POTASSIUM SERPL-SCNC: 4 MMOL/L — SIGNIFICANT CHANGE UP (ref 3.5–5.3)
PROT SERPL-MCNC: 6.5 G/DL — SIGNIFICANT CHANGE UP (ref 6–8.3)
RBC # BLD: 4.46 M/UL — SIGNIFICANT CHANGE UP (ref 3.8–5.2)
RBC # FLD: 13.9 % — SIGNIFICANT CHANGE UP (ref 10.3–14.5)
SODIUM SERPL-SCNC: 147 MMOL/L — HIGH (ref 135–145)
T4 FREE SERPL-MCNC: 0.73 NG/DL — SIGNIFICANT CHANGE UP (ref 0.7–1.48)
TSH SERPL-MCNC: 0.4 UIU/ML — SIGNIFICANT CHANGE UP (ref 0.35–4.94)
WBC # BLD: 8.94 K/UL — SIGNIFICANT CHANGE UP (ref 3.8–10.5)
WBC # FLD AUTO: 8.94 K/UL — SIGNIFICANT CHANGE UP (ref 3.8–10.5)

## 2020-04-21 PROCEDURE — 99233 SBSQ HOSP IP/OBS HIGH 50: CPT | Mod: CS,GC

## 2020-04-21 RX ORDER — SODIUM CHLORIDE 9 MG/ML
1000 INJECTION, SOLUTION INTRAVENOUS
Refills: 0 | Status: DISCONTINUED | OUTPATIENT
Start: 2020-04-21 | End: 2020-04-22

## 2020-04-21 RX ORDER — SODIUM CHLORIDE 9 MG/ML
1000 INJECTION, SOLUTION INTRAVENOUS
Refills: 0 | Status: DISCONTINUED | OUTPATIENT
Start: 2020-04-21 | End: 2020-04-21

## 2020-04-21 RX ORDER — ERTAPENEM SODIUM 1 G/1
1000 INJECTION, POWDER, LYOPHILIZED, FOR SOLUTION INTRAMUSCULAR; INTRAVENOUS EVERY 24 HOURS
Refills: 0 | Status: DISCONTINUED | OUTPATIENT
Start: 2020-04-22 | End: 2020-04-24

## 2020-04-21 RX ORDER — DEXTROSE 50 % IN WATER 50 %
50 SYRINGE (ML) INTRAVENOUS ONCE
Refills: 0 | Status: COMPLETED | OUTPATIENT
Start: 2020-04-21 | End: 2020-04-21

## 2020-04-21 RX ADMIN — Medication 1 APPLICATION(S): at 04:34

## 2020-04-21 RX ADMIN — Medication 50 MILLILITER(S): at 10:16

## 2020-04-21 RX ADMIN — ERTAPENEM SODIUM 120 MILLIGRAM(S): 1 INJECTION, POWDER, LYOPHILIZED, FOR SOLUTION INTRAMUSCULAR; INTRAVENOUS at 10:12

## 2020-04-21 RX ADMIN — MIRTAZAPINE 7.5 MILLIGRAM(S): 45 TABLET, ORALLY DISINTEGRATING ORAL at 23:11

## 2020-04-21 RX ADMIN — Medication 40 MILLIGRAM(S): at 04:33

## 2020-04-21 RX ADMIN — SODIUM CHLORIDE 65 MILLILITER(S): 9 INJECTION, SOLUTION INTRAVENOUS at 10:16

## 2020-04-21 RX ADMIN — APIXABAN 2.5 MILLIGRAM(S): 2.5 TABLET, FILM COATED ORAL at 23:11

## 2020-04-21 RX ADMIN — Medication 1 APPLICATION(S): at 18:22

## 2020-04-21 RX ADMIN — APIXABAN 2.5 MILLIGRAM(S): 2.5 TABLET, FILM COATED ORAL at 09:51

## 2020-04-21 RX ADMIN — Medication 1 DROP(S): at 04:34

## 2020-04-21 RX ADMIN — Medication 1 DROP(S): at 18:23

## 2020-04-21 RX ADMIN — SODIUM CHLORIDE 60 MILLILITER(S): 9 INJECTION, SOLUTION INTRAVENOUS at 08:51

## 2020-04-21 NOTE — PROGRESS NOTE ADULT - PROBLEM SELECTOR PLAN 3
Sepsis 2/2 UTI due to ESBL Ecoli c/b metabolic encephalopathy   - s/p 2 doses of IV CTX 1g q24h, however ESBL resistant to CTX; switched to IV ertapenem 1g q24h 4/18  - will discharge to rehab on Bactrim pending renal improvement  - blood cultures NGTD

## 2020-04-21 NOTE — PROGRESS NOTE ADULT - PROBLEM SELECTOR PLAN 1
Meeting 2/4 SIRS criteria (hypothermia and WBC 21). lactate 2.9, cleared to 1.2 s/p NS bolus, likely in setting of decreased PO intake. U/A grossly positive. Pt urinating with primafit  - s/p 2 doses of IV CTX 1g q24h, however ESBL resistant to CTX; switched to ertapenem 4/18  - blood cultures NGTD Meeting 2/4 SIRS criteria (hypothermia and WBC 21). lactate 2.9, cleared to 1.2 s/p NS bolus, likely in setting of decreased PO intake. U/A grossly positive. Pt urinating with primafit  - s/p 2 doses of IV CTX 1g q24h, however ESBL resistant to CTX; switched to ertapenem 4/18  - continue IV ertapenem 1g q24h   - blood cultures NGTD  - f/u repeat blood culture

## 2020-04-21 NOTE — PROGRESS NOTE ADULT - PROBLEM SELECTOR PLAN 6
RESOLVED   BUN/Cr 86/1.61 on admission, most likely 2/2 dehydration s/p NS IVF 500cc bolus in ED Cr improved to 1.45. Cr back to baseline 0.94  -Continue to monitor and avoid nephrotoxic agents RESOLVED   BUN/Cr 86/1.61 on admission, most likely 2/2 dehydration s/p NS IVF 500cc bolus in ED Cr improved to 1.45. Cr back to baseline 0.94  -Continue to monitor and avoid nephrotoxic agents    #elevated BUN   Persistently elevated BUN despite recovery of Cr. However, BUN is still down from admission. Pt without signs of bleeding. Hgb stable   - continue to monitor

## 2020-04-21 NOTE — CHART NOTE - NSCHARTNOTEFT_GEN_A_CORE
Infectious Diseases Anti-infective Approval Note    Medication:  Ertapenem  Dose:  1 gram   Route:  IV  Frequency:  daily  Duration:  4 days    Dose may be adjusted as needed for alterations in renal function.    *THIS IS NOT AN INFECTIOUS DISEASES CONSULTATION*

## 2020-04-21 NOTE — PROGRESS NOTE ADULT - ASSESSMENT
89yo Female, DNR/DNI (no abx or tube feeds), with PMHx of HTN, HLD, dCHF and generalized anxiety disorder who was brought to [facility] ED from [facility] after pt was found to be hypoxic to the 80s on RA and hypotensive.COVID+, RETA and dehydrated, pt now admitted to [facility] for further management.

## 2020-04-21 NOTE — PROGRESS NOTE ADULT - PROBLEM SELECTOR PLAN 5
RESOLVING  AOx0 on admission, nonverbal, baseline per friend and niece (Radha) a week ago, lucid and eating/drinking. AMS likely multifactorial including urosepsis and COVID and hypernatremia. AOx1-2 today  -mental status improving--more awake today, opening eyes, tracking, intermittently smiling and nodding  -continue tx COVID, hypernatremia, and urosepsis  -ADDENDUM: pt w/ metabolic encephalopathy likely 2/2 COVID, dehydration, hypernatremia, and UTI; expect improvement with continued management    #poor po intake   - speech and swallow cleared for pureed and thin liquids AOx0 on admission, nonverbal, baseline per friend and niece (Radha) a week ago, lucid and eating/drinking. AMS likely multifactorial including urosepsis and COVID and hypernatremia. AOx0 today, unclear if not participatory or mental status deteriorating  -waxing and waning mental status; only opening eyes and responding to voice this AM; will re-evaluate in the PM  -continue tx COVID, hypernatremia, and urosepsis  -pt w/ metabolic encephalopathy likely 2/2 COVID, dehydration, hypernatremia, and UTI; expect improvement with continued management    #poor po intake   - speech and swallow cleared for pureed and thin liquids  - pt intermittently refusing po

## 2020-04-21 NOTE — PROGRESS NOTE ADULT - PROBLEM SELECTOR PLAN 7
PLT 71 on admission likely in setting of COVID and acute infection as pt with normal plt in 07/2018.  -Continue to monitor for now and transfuse platelets if less than 10K or less than 50K w/ bleeding  - continue lovenox 45mg q24h   - fibrinogen elevated--not DIC Improving. Plt 115 this AM. PLT 71 on admission likely in setting of COVID and acute infection as pt with normal plt in 07/2018.  -Continue to monitor for now and transfuse platelets if less than 10K or less than 50K w/ bleeding  - continue eliquis 2.5mg BID  - fibrinogen elevated--not DIC

## 2020-04-21 NOTE — PROGRESS NOTE ADULT - PROBLEM SELECTOR PLAN 9
Plan: hx of LOLA  -Hold home Mirtazapine 5mg qHS. Plan: hx of LOLA  - continue home Mirtazapine 5mg qHS.

## 2020-04-21 NOTE — CHART NOTE - NSCHARTNOTEFT_GEN_A_CORE
Admitting Diagnosis:   Patient is a 89y old  Female who presents with a chief complaint of COVID (21 Apr 2020 07:34)      PAST MEDICAL & SURGICAL HISTORY:  Chronic kidney disease (CKD)  HLD (hyperlipidemia)  Diastolic CHF  UTI (urinary tract infection)  Syncope and collapse  Hypertension  ASHD (arteriosclerotic heart disease)  Anxiety  H/O bilateral hip replacements      Current Nutrition Order:Pureed thin       PO Intake: Good (%) [   ]  Fair (50-75%) [   ] Poor (<25%) [ x  ]    GI Issues: none noted    Pain:not noted    Skin Integrity:skin tear.No PU    Labs:   04-21    147<H>  |  114<H>  |  48<H>  ----------------------------<  95  4.0   |  21<L>  |  0.68    Ca    8.3<L>      21 Apr 2020 06:19  Phos  3.9     04-21  Mg     2.5     04-21    TPro  6.5  /  Alb  2.3<L>  /  TBili  0.4  /  DBili  x   /  AST  50<H>  /  ALT  59<H>  /  AlkPhos  61  04-21    CAPILLARY BLOOD GLUCOSE      POCT Blood Glucose.: 119 mg/dL (20 Apr 2020 22:32)  POCT Blood Glucose.: 136 mg/dL (20 Apr 2020 17:19)  POCT Blood Glucose.: 143 mg/dL (20 Apr 2020 15:44)  POCT Blood Glucose.: 58 mg/dL (20 Apr 2020 12:31)      Medications:  MEDICATIONS  (STANDING):  ammonium lactate 12% Lotion 1 Application(s) Topical two times a day  apixaban 2.5 milliGRAM(s) Oral every 12 hours  artificial  tears Solution 1 Drop(s) Both EYES two times a day  dextrose 5%. 1000 milliLiter(s) (60 mL/Hr) IV Continuous <Continuous>  dextrose 5%. 1000 milliLiter(s) (50 mL/Hr) IV Continuous <Continuous>  dextrose 50% Injectable 12.5 Gram(s) IV Push once  dextrose 50% Injectable 25 Gram(s) IV Push once  dextrose 50% Injectable 25 Gram(s) IV Push once  ertapenem  IVPB 1000 milliGRAM(s) IV Intermittent every 24 hours  insulin lispro (HumaLOG) corrective regimen sliding scale   SubCutaneous Before meals and at bedtime  mirtazapine 7.5 milliGRAM(s) Oral at bedtime    MEDICATIONS  (PRN):  dextrose 40% Gel 15 Gram(s) Oral once PRN Blood Glucose LESS THAN 70 milliGRAM(s)/deciliter  glucagon  Injectable 1 milliGRAM(s) IntraMuscular once PRN Glucose LESS THAN 70 milligrams/deciliter      Weight:45kg  Daily     Daily     Weight Change: no updated weights    Estimated energy needs: ABW used due to % of IBW.ABW:45kg [id number]:1125-1350kcal and 1.2-1.4gmprotein:54-63gmprotein and fluids per team.    Subjective: 89y/o female with history of HTN,HLD,CHF,Anxiety admitted from [facility] with COVID-19+/RETA and dehydration 2/2 poor po.Patient continues to take negligible intake.Noted per MD,to be more alert today.Patient must be fed.Seen by SLP with noted recommendations for pureed with thin. Noted DNR/DNI with no TF.   PES: inadequate oral intake r/t inability to meet EER 2/2 mental status AEB: NPO    Active [   ]  Resolved [ x  ]    If resolved, new PES: Inadequate oral intake r/t inability to meet >75% of EER 2/2 mental status/advance age/catabolic COVID state AEB: less than 25% intake     Goal: Align nutrition with goals of care .No aggressive nutritional intervention noted    Recommendations:1.COntinue to feed and encourage po 2.Add 1 Ensure enlive (350kcal and 20gmprotein) for ease in consumption 3.Updated weights  Education: not warranted    Risk Level: High [   ] Moderate [  x ] Low [   ]

## 2020-04-21 NOTE — PROGRESS NOTE ADULT - PROBLEM SELECTOR PLAN 4
Na 159 on admission, s/p NS IVF 500cc bolus in ED. Likely 2/2 dehydration as evidenced by labs; 148 this AM  -will continue D5W @75cc/hr x 12 hours for hypernatremia likely 2/2 poor po intake Na 159 on admission, s/p NS IVF 500cc bolus in ED. Likely 2/2 dehydration as evidenced by labs; 147 this AM  -will continue D5W+LR @65cc/hr x 24 hours for hypernatremia likely 2/2 poor po intake Na 159 on admission, s/p NS IVF 500cc bolus in ED. Likely 2/2 dehydration as evidenced by labs; 147 this AM  -will continue D5W+LR @65cc/hr x 24 hours for hypernatremia likely 2/2 poor po intake; pt is refusing po  - pending mental status, pt will likely have to be on D5+LR

## 2020-04-21 NOTE — PROGRESS NOTE ADULT - SUBJECTIVE AND OBJECTIVE BOX
INCOMPLETE  OVERNIGHT EVENTS: NAEO    SUBJECTIVE / INTERVAL HPI: Patient seen and examined at bedside. Hypothermic to 93F rectally overnight and pt subjectively cold. Blood cultures will be sent and joselyn hunterer to be put on.    VITAL SIGNS:  Vital Signs Last 24 Hrs  T(C): 34.3 (21 Apr 2020 06:20), Max: 36.3 (20 Apr 2020 21:47)  T(F): 93.8 (21 Apr 2020 06:20), Max: 97.3 (20 Apr 2020 21:47)  HR: 97 (21 Apr 2020 06:20) (77 - 97)  BP: 136/77 (21 Apr 2020 06:20) (115/71 - 136/77)  BP(mean): --  RR: 20 (21 Apr 2020 06:20) (20 - 24)  SpO2: 97% (21 Apr 2020 06:20) (97% - 97%)    PHYSICAL EXAM:    General: chronically ill appearing frail elderly female, speaking intermittently, AOx1-2  HEENT: NC/AT; PERRL, anicteric sclera; poor oral hygiene, dry lips  Respiratory: no increased work of breathing, breathing comfortably on RA; saturating 98%  Gastrointestinal: soft, NT/ND  : primafit in place draining yellow-orange urine  Extremities: WWP; no edema, chronic vascular changes bilaterally up to knees with scaling, no purulence or exudates  Neurological: speaking intermittently, AOx1-2    MEDICATIONS:  MEDICATIONS  (STANDING):  ammonium lactate 12% Lotion 1 Application(s) Topical two times a day  apixaban 2.5 milliGRAM(s) Oral every 12 hours  artificial  tears Solution 1 Drop(s) Both EYES two times a day  dextrose 5%. 1000 milliLiter(s) (60 mL/Hr) IV Continuous <Continuous>  dextrose 5%. 1000 milliLiter(s) (50 mL/Hr) IV Continuous <Continuous>  dextrose 50% Injectable 12.5 Gram(s) IV Push once  dextrose 50% Injectable 25 Gram(s) IV Push once  dextrose 50% Injectable 25 Gram(s) IV Push once  ertapenem  IVPB 1000 milliGRAM(s) IV Intermittent every 24 hours  insulin lispro (HumaLOG) corrective regimen sliding scale   SubCutaneous Before meals and at bedtime  mirtazapine 7.5 milliGRAM(s) Oral at bedtime    MEDICATIONS  (PRN):  dextrose 40% Gel 15 Gram(s) Oral once PRN Blood Glucose LESS THAN 70 milliGRAM(s)/deciliter  glucagon  Injectable 1 milliGRAM(s) IntraMuscular once PRN Glucose LESS THAN 70 milligrams/deciliter      ALLERGIES:  Allergies    No Known Allergies    Intolerances        LABS:                        12.9   8.94  )-----------( 115      ( 21 Apr 2020 06:19 )             41.6     04-21    147<H>  |  114<H>  |  48<H>  ----------------------------<  95  4.0   |  21<L>  |  0.68    Ca    8.3<L>      21 Apr 2020 06:19  Phos  3.9     04-21  Mg     2.5     04-21    TPro  6.5  /  Alb  2.3<L>  /  TBili  0.4  /  DBili  x   /  AST  50<H>  /  ALT  59<H>  /  AlkPhos  61  04-21        CAPILLARY BLOOD GLUCOSE      POCT Blood Glucose.: 119 mg/dL (20 Apr 2020 22:32)      RADIOLOGY & ADDITIONAL TESTS: Reviewed. OVERNIGHT EVENTS: NAEO    SUBJECTIVE / INTERVAL HPI: Patient seen and examined at bedside. Hypothermic to 93F rectally overnight and pt subjectively cold. Blood cultures will be sent and joselyn salomon to be put on (not any in the hospital). Pt this morning not conversive. Responds to voice and name (opening eyes) however, not answering questions.     VITAL SIGNS:  Vital Signs Last 24 Hrs  T(C): 34.3 (21 Apr 2020 06:20), Max: 36.3 (20 Apr 2020 21:47)  T(F): 93.8 (21 Apr 2020 06:20), Max: 97.3 (20 Apr 2020 21:47)  HR: 97 (21 Apr 2020 06:20) (77 - 97)  BP: 136/77 (21 Apr 2020 06:20) (115/71 - 136/77)  BP(mean): --  RR: 20 (21 Apr 2020 06:20) (20 - 24)  SpO2: 97% (21 Apr 2020 06:20) (97% - 97%)    PHYSICAL EXAM:    General: chronically ill appearing frail elderly female, opening eyes to voice, AOx0  HEENT: NC/AT; PERRL, anicteric sclera; poor oral hygiene, dry lips  Respiratory: no increased work of breathing, breathing comfortably on RA; saturating 98%  Gastrointestinal: soft, NT/ND  : primafit in place draining yellow-orange urine  Extremities: WWP; no edema, chronic vascular changes bilaterally up to knees with scaling, no purulence or exudates  Neurological: opening eyes to voice, AOx0    MEDICATIONS:  MEDICATIONS  (STANDING):  ammonium lactate 12% Lotion 1 Application(s) Topical two times a day  apixaban 2.5 milliGRAM(s) Oral every 12 hours  artificial  tears Solution 1 Drop(s) Both EYES two times a day  dextrose 5%. 1000 milliLiter(s) (60 mL/Hr) IV Continuous <Continuous>  dextrose 5%. 1000 milliLiter(s) (50 mL/Hr) IV Continuous <Continuous>  dextrose 50% Injectable 12.5 Gram(s) IV Push once  dextrose 50% Injectable 25 Gram(s) IV Push once  dextrose 50% Injectable 25 Gram(s) IV Push once  ertapenem  IVPB 1000 milliGRAM(s) IV Intermittent every 24 hours  insulin lispro (HumaLOG) corrective regimen sliding scale   SubCutaneous Before meals and at bedtime  mirtazapine 7.5 milliGRAM(s) Oral at bedtime    MEDICATIONS  (PRN):  dextrose 40% Gel 15 Gram(s) Oral once PRN Blood Glucose LESS THAN 70 milliGRAM(s)/deciliter  glucagon  Injectable 1 milliGRAM(s) IntraMuscular once PRN Glucose LESS THAN 70 milligrams/deciliter      ALLERGIES:  Allergies    No Known Allergies    Intolerances        LABS:                        12.9   8.94  )-----------( 115      ( 21 Apr 2020 06:19 )             41.6     04-21    147<H>  |  114<H>  |  48<H>  ----------------------------<  95  4.0   |  21<L>  |  0.68    Ca    8.3<L>      21 Apr 2020 06:19  Phos  3.9     04-21  Mg     2.5     04-21    TPro  6.5  /  Alb  2.3<L>  /  TBili  0.4  /  DBili  x   /  AST  50<H>  /  ALT  59<H>  /  AlkPhos  61  04-21        CAPILLARY BLOOD GLUCOSE      POCT Blood Glucose.: 119 mg/dL (20 Apr 2020 22:32)      RADIOLOGY & ADDITIONAL TESTS: Reviewed.

## 2020-04-21 NOTE — PROGRESS NOTE ADULT - PROBLEM SELECTOR PLAN 2
Presented with few weeks of generalized weakness and SOB; At [facility] pt found to be hypoxic to the 80s, now Saturating 98% on RA  - COVID+, contact droplet isolation  - inflammatory markers: ddimer 3667, CRP 25.72, Ferritin 3048; procalcitonin 0.79, lactate 2.9, WBC 21.11 with left shift  -QTC on admission 475  -CXR revealed clear lungs  -confirmed with HCP that previous "no abx" stated on previous MOLST will be reversed   -continue treatment with IV azithromycin 250mg x 5 days (end 4/20)  -c/w solu-medrol 40mg IV BID x 5 days (end 4/20)  -Continue to monitor daily CBC/CMP/Mg/PO4/CRP/Ferritin/D-Dimer  -saturating on RA 94%    #hypercoagulable state 2/2 COVID; b/l DVT +  LE dopplers positive for b/l DVTs   - treat with lovenox 45mg q24h (treatment dose q24h for low CrCl), will switch to eliquis 2.5mg BID Presented with few weeks of generalized weakness and SOB; At [facility] pt found to be hypoxic to the 80s, now Saturating 98% on RA  - COVID+, contact droplet isolation  - inflammatory markers: ddimer 2122, CRP 25.72-->2, Ferritin 3048; procalcitonin 0.79, lactate 2.9, WBC 21.11 with left shift; all resolving and trending down  -QTC on admission 475  -CXR revealed clear lungs  -confirmed with HCP that previous "no abx" stated on previous MOLST will be reversed   -continue treatment with IV azithromycin 250mg x 5 days (end 4/20)  -c/w solu-medrol 40mg IV BID x 5 days (end 4/20)  -Continue to monitor daily CBC/CMP/Mg/PO4/CRP/Ferritin/D-Dimer  -saturating on RA 94%    #hypercoagulable state 2/2 COVID; b/l DVT +  LE dopplers positive for b/l DVTs   - treat with lovenox 45mg q24h (treatment dose q24h for low CrCl), will switch to eliquis 2.5mg BID

## 2020-04-21 NOTE — PROGRESS NOTE ADULT - ATTENDING COMMENTS
Patient w/ hypothermia in AM, improving throughout the day w/ heating pads and blankets. Likely 2/2 COVID infection; labs not consistent w/ adrenal insufficiency (pt w/ episodes of hypoglycemia, however no hyponatremia or hypotension and pt w/ normal thyroxine levels). Will continue to manage supportively. Patient w/ persistently elevated BUN, likely prerenal azotemia 2/2 dehydration. No melena/hematemesis present; will continue to trend hgb to assess for UGIB. Continuing ertapenem for UTI in the setting of renal dysfunction; will transition to PO bactrim once renal function improves.    I have personally seen, examined, and participated in the care of this patient. I have reviewed all pertinent clinical information, including history, physical exam, plan, and the note and agree except as noted.

## 2020-04-22 LAB
ALBUMIN SERPL ELPH-MCNC: 1.8 G/DL — LOW (ref 3.3–5)
ALP SERPL-CCNC: 52 U/L — SIGNIFICANT CHANGE UP (ref 40–120)
ALT FLD-CCNC: 51 U/L — HIGH (ref 10–45)
ANION GAP SERPL CALC-SCNC: 13 MMOL/L — SIGNIFICANT CHANGE UP (ref 5–17)
AST SERPL-CCNC: 43 U/L — HIGH (ref 10–40)
B-OH-BUTYR SERPL-SCNC: 0.1 MMOL/L — SIGNIFICANT CHANGE UP
BASOPHILS # BLD AUTO: 0.02 K/UL — SIGNIFICANT CHANGE UP (ref 0–0.2)
BASOPHILS NFR BLD AUTO: 0.2 % — SIGNIFICANT CHANGE UP (ref 0–2)
BILIRUB SERPL-MCNC: 0.4 MG/DL — SIGNIFICANT CHANGE UP (ref 0.2–1.2)
BUN SERPL-MCNC: 37 MG/DL — HIGH (ref 7–23)
CALCIUM SERPL-MCNC: 7.8 MG/DL — LOW (ref 8.4–10.5)
CHLORIDE SERPL-SCNC: 116 MMOL/L — HIGH (ref 96–108)
CO2 SERPL-SCNC: 17 MMOL/L — LOW (ref 22–31)
CREAT SERPL-MCNC: 0.64 MG/DL — SIGNIFICANT CHANGE UP (ref 0.5–1.3)
CRP SERPL-MCNC: 1.27 MG/DL — HIGH (ref 0–0.4)
EOSINOPHIL # BLD AUTO: 0.01 K/UL — SIGNIFICANT CHANGE UP (ref 0–0.5)
EOSINOPHIL NFR BLD AUTO: 0.1 % — SIGNIFICANT CHANGE UP (ref 0–6)
FERRITIN SERPL-MCNC: 996 NG/ML — HIGH (ref 15–150)
FOLATE SERPL-MCNC: 7 NG/ML — SIGNIFICANT CHANGE UP
GLUCOSE BLDC GLUCOMTR-MCNC: 118 MG/DL — HIGH (ref 70–99)
GLUCOSE BLDC GLUCOMTR-MCNC: 44 MG/DL — CRITICAL LOW (ref 70–99)
GLUCOSE BLDC GLUCOMTR-MCNC: 77 MG/DL — SIGNIFICANT CHANGE UP (ref 70–99)
GLUCOSE BLDC GLUCOMTR-MCNC: 77 MG/DL — SIGNIFICANT CHANGE UP (ref 70–99)
GLUCOSE BLDC GLUCOMTR-MCNC: 82 MG/DL — SIGNIFICANT CHANGE UP (ref 70–99)
GLUCOSE BLDC GLUCOMTR-MCNC: 94 MG/DL — SIGNIFICANT CHANGE UP (ref 70–99)
GLUCOSE BLDC GLUCOMTR-MCNC: 96 MG/DL — SIGNIFICANT CHANGE UP (ref 70–99)
GLUCOSE SERPL-MCNC: 109 MG/DL — HIGH (ref 70–99)
HCT VFR BLD CALC: 44.7 % — SIGNIFICANT CHANGE UP (ref 34.5–45)
HGB BLD-MCNC: 13.2 G/DL — SIGNIFICANT CHANGE UP (ref 11.5–15.5)
IMM GRANULOCYTES NFR BLD AUTO: 1.2 % — SIGNIFICANT CHANGE UP (ref 0–1.5)
LACTATE SERPL-SCNC: 3.2 MMOL/L — HIGH (ref 0.5–2)
LYMPHOCYTES # BLD AUTO: 1.01 K/UL — SIGNIFICANT CHANGE UP (ref 1–3.3)
LYMPHOCYTES # BLD AUTO: 10 % — LOW (ref 13–44)
MAGNESIUM SERPL-MCNC: 2.3 MG/DL — SIGNIFICANT CHANGE UP (ref 1.6–2.6)
MCHC RBC-ENTMCNC: 29.5 GM/DL — LOW (ref 32–36)
MCHC RBC-ENTMCNC: 29.7 PG — SIGNIFICANT CHANGE UP (ref 27–34)
MCV RBC AUTO: 100.7 FL — HIGH (ref 80–100)
MONOCYTES # BLD AUTO: 0.47 K/UL — SIGNIFICANT CHANGE UP (ref 0–0.9)
MONOCYTES NFR BLD AUTO: 4.7 % — SIGNIFICANT CHANGE UP (ref 2–14)
NEUTROPHILS # BLD AUTO: 8.45 K/UL — HIGH (ref 1.8–7.4)
NEUTROPHILS NFR BLD AUTO: 83.8 % — HIGH (ref 43–77)
NRBC # BLD: 0 /100 WBCS — SIGNIFICANT CHANGE UP (ref 0–0)
PHOSPHATE SERPL-MCNC: 3.1 MG/DL — SIGNIFICANT CHANGE UP (ref 2.5–4.5)
PLATELET # BLD AUTO: 111 K/UL — LOW (ref 150–400)
POTASSIUM SERPL-MCNC: 4.2 MMOL/L — SIGNIFICANT CHANGE UP (ref 3.5–5.3)
POTASSIUM SERPL-SCNC: 4.2 MMOL/L — SIGNIFICANT CHANGE UP (ref 3.5–5.3)
PROT SERPL-MCNC: 5.5 G/DL — LOW (ref 6–8.3)
RBC # BLD: 4.44 M/UL — SIGNIFICANT CHANGE UP (ref 3.8–5.2)
RBC # FLD: 14 % — SIGNIFICANT CHANGE UP (ref 10.3–14.5)
SODIUM SERPL-SCNC: 146 MMOL/L — HIGH (ref 135–145)
VIT B12 SERPL-MCNC: >2000 PG/ML — HIGH (ref 232–1245)
WBC # BLD: 10.08 K/UL — SIGNIFICANT CHANGE UP (ref 3.8–10.5)
WBC # FLD AUTO: 10.08 K/UL — SIGNIFICANT CHANGE UP (ref 3.8–10.5)

## 2020-04-22 PROCEDURE — 99233 SBSQ HOSP IP/OBS HIGH 50: CPT | Mod: CS,GC

## 2020-04-22 RX ORDER — CHLORHEXIDINE GLUCONATE 213 G/1000ML
1 SOLUTION TOPICAL
Refills: 0 | Status: DISCONTINUED | OUTPATIENT
Start: 2020-04-22 | End: 2020-04-24

## 2020-04-22 RX ORDER — SODIUM CHLORIDE 9 MG/ML
500 INJECTION, SOLUTION INTRAVENOUS ONCE
Refills: 0 | Status: COMPLETED | OUTPATIENT
Start: 2020-04-22 | End: 2020-04-22

## 2020-04-22 RX ORDER — SODIUM CHLORIDE 9 MG/ML
10 INJECTION INTRAMUSCULAR; INTRAVENOUS; SUBCUTANEOUS
Refills: 0 | Status: DISCONTINUED | OUTPATIENT
Start: 2020-04-22 | End: 2020-04-24

## 2020-04-22 RX ORDER — DEXTROSE 50 % IN WATER 50 %
50 SYRINGE (ML) INTRAVENOUS ONCE
Refills: 0 | Status: COMPLETED | OUTPATIENT
Start: 2020-04-22 | End: 2020-04-22

## 2020-04-22 RX ORDER — SODIUM CHLORIDE 9 MG/ML
500 INJECTION, SOLUTION INTRAVENOUS
Refills: 0 | Status: DISCONTINUED | OUTPATIENT
Start: 2020-04-22 | End: 2020-04-22

## 2020-04-22 RX ORDER — SODIUM CHLORIDE 9 MG/ML
1000 INJECTION, SOLUTION INTRAVENOUS
Refills: 0 | Status: DISCONTINUED | OUTPATIENT
Start: 2020-04-22 | End: 2020-04-23

## 2020-04-22 RX ORDER — MIRTAZAPINE 45 MG/1
15 TABLET, ORALLY DISINTEGRATING ORAL AT BEDTIME
Refills: 0 | Status: DISCONTINUED | OUTPATIENT
Start: 2020-04-22 | End: 2020-04-24

## 2020-04-22 RX ORDER — DEXTROSE 50 % IN WATER 50 %
12.5 SYRINGE (ML) INTRAVENOUS ONCE
Refills: 0 | Status: COMPLETED | OUTPATIENT
Start: 2020-04-22 | End: 2020-04-22

## 2020-04-22 RX ADMIN — APIXABAN 2.5 MILLIGRAM(S): 2.5 TABLET, FILM COATED ORAL at 22:48

## 2020-04-22 RX ADMIN — SODIUM CHLORIDE 500 MILLILITER(S): 9 INJECTION, SOLUTION INTRAVENOUS at 16:37

## 2020-04-22 RX ADMIN — Medication 1 DROP(S): at 17:00

## 2020-04-22 RX ADMIN — ERTAPENEM SODIUM 120 MILLIGRAM(S): 1 INJECTION, POWDER, LYOPHILIZED, FOR SOLUTION INTRAMUSCULAR; INTRAVENOUS at 10:50

## 2020-04-22 RX ADMIN — Medication 50 MILLILITER(S): at 18:02

## 2020-04-22 RX ADMIN — SODIUM CHLORIDE 65 MILLILITER(S): 9 INJECTION, SOLUTION INTRAVENOUS at 02:31

## 2020-04-22 RX ADMIN — Medication 1 DROP(S): at 10:51

## 2020-04-22 RX ADMIN — MIRTAZAPINE 15 MILLIGRAM(S): 45 TABLET, ORALLY DISINTEGRATING ORAL at 22:48

## 2020-04-22 RX ADMIN — APIXABAN 2.5 MILLIGRAM(S): 2.5 TABLET, FILM COATED ORAL at 10:48

## 2020-04-22 RX ADMIN — Medication 1 APPLICATION(S): at 10:50

## 2020-04-22 RX ADMIN — Medication 12.5 GRAM(S): at 02:12

## 2020-04-22 RX ADMIN — Medication 1 APPLICATION(S): at 17:00

## 2020-04-22 RX ADMIN — Medication 1 TABLET(S): at 15:13

## 2020-04-22 NOTE — PROCEDURE NOTE - NSPOSTCAREGUIDE_GEN_A_CORE
Keep the cast/splint/dressing clean and dry/Verbal/written post procedure instructions were given to patient/caregiver/Care for catheter as per unit/ICU protocols/Instructed patient/caregiver regarding signs and symptoms of infection

## 2020-04-22 NOTE — PROGRESS NOTE ADULT - PROBLEM SELECTOR PLAN 10
F: D5+LR @65cc/hr x 24hrs  E: K >4, Mag >2  N: dysphagia; pureed and thin liquids per speech/swallow  GI: Famotidine 20mg daily  DVT ppx: eliquis 2.5mg bid  Code Status: DNR/DNI, confirmed NOT comfort care

## 2020-04-22 NOTE — GOALS OF CARE CONVERSATION - ADVANCED CARE PLANNING - CONVERSATION DETAILS
Pt already DNR/DNI w/ MOLST in chart.  Discussed GOC given poor PO intake and current need for IVF to maintain glucose and hydration. Discussed option of comfort care vs. continuing abx/ IVF and placing a midline (as pt has very difficult PIV access). Pt family wanted to pursue midline, and think about rest of GOC decisions.

## 2020-04-22 NOTE — PROGRESS NOTE ADULT - PROBLEM SELECTOR PLAN 1
Meeting 2/4 SIRS criteria (hypothermia and WBC 21). lactate 2.9, cleared to 1.2 s/p NS bolus, likely in setting of decreased PO intake. U/A grossly positive. Pt urinating with primafit  - s/p 2 doses of IV CTX 1g q24h, however ESBL resistant to CTX; switched to ertapenem 4/18  - continue IV ertapenem 1g q24h   - blood cultures NGTD  - f/u repeat blood culture

## 2020-04-22 NOTE — PROGRESS NOTE ADULT - PROBLEM SELECTOR PLAN 7
Improving. Plt 115 this AM. PLT 71 on admission likely in setting of COVID and acute infection as pt with normal plt in 07/2018.  -Continue to monitor for now and transfuse platelets if less than 10K or less than 50K w/ bleeding  - continue eliquis 2.5mg BID  - fibrinogen elevated--not DIC

## 2020-04-22 NOTE — PROGRESS NOTE ADULT - PROBLEM SELECTOR PLAN 4
Na 159 on admission, s/p NS IVF 500cc bolus in ED. Likely 2/2 dehydration as evidenced by labs; 147 this AM  -will continue D5W+LR @65cc/hr x 24 hours for hypernatremia likely 2/2 poor po intake; pt is refusing po  - pending mental status, pt will likely have to be on D5+LR

## 2020-04-22 NOTE — PROGRESS NOTE ADULT - ASSESSMENT
89yo Female, DNR/DNI (no abx or tube feeds), with PMHx of HTN, HLD, dCHF and generalized anxiety disorder who was brought to [facility] ED from [facility] after pt was found to be hypoxic to the 80s on RA and hypotensive.COVID+, RETA and dehydrated, pt now admitted to [facility] for further management.

## 2020-04-22 NOTE — PROGRESS NOTE ADULT - PROBLEM SELECTOR PLAN 6
RESOLVED   BUN/Cr 86/1.61 on admission, most likely 2/2 dehydration s/p NS IVF 500cc bolus in ED Cr improved to 1.45. Cr back to baseline 0.94  -Continue to monitor and avoid nephrotoxic agents    #elevated BUN   Persistently elevated BUN despite recovery of Cr. However, BUN is still down from admission. Pt without signs of bleeding. Hgb stable   - continue to monitor

## 2020-04-22 NOTE — PROGRESS NOTE ADULT - PROBLEM SELECTOR PLAN 5
AOx0 on admission, nonverbal, baseline per friend and niece (Radha) a week ago, lucid and eating/drinking. AMS likely multifactorial including urosepsis and COVID and hypernatremia. AOx0 today, unclear if not participatory or mental status deteriorating  -waxing and waning mental status; only opening eyes and responding to voice this AM; will re-evaluate in the PM  -continue tx COVID, hypernatremia, and urosepsis  -pt w/ metabolic encephalopathy likely 2/2 COVID, dehydration, hypernatremia, and UTI    #poor po intake   - speech and swallow cleared for pureed and thin liquids  - pt intermittently refusing po

## 2020-04-22 NOTE — CONSULT NOTE ADULT - SUBJECTIVE AND OBJECTIVE BOX
Vascular Access Service Consult Note    89yFemaleHEALTH ISSUES - PROBLEM Dx:  UTI (urinary tract infection): UTI (urinary tract infection)  Severe sepsis: Severe sepsis  Lactic acidosis: Lactic acidosis  Encephalopathy: Encephalopathy  Elevated d-dimer: Elevated d-dimer  Prophylactic measure: Prophylactic measure  Anxiety: Anxiety  Hypertension: Hypertension  Thrombocytopenia: Thrombocytopenia  RETA (acute kidney injury): RETA (acute kidney injury)  Hypernatremia: Hypernatremia  COVID-19: COVID-19             Diagnosis: covid-19     Indications for Vascular Access (Check all that apply)  [  ]  Antibiotic Therapy       Antibiotic Prescribed:                                                                             Expected Duration of Therapy:               [  ]  IV Hydration  [  ]  Total Parenteral Nutrition  [  ]  Chemotherapy  [x  ]  Difficult Venous Access  [  ]  CVP monitoring  [  ]  Medications with high potential for tissue necrosis on extravasation  [  ]  Other    Screening (Check all that apply)  Previous Radiation to chest  [  ] Yes      [x  ]  No  Breast Cancer                          [  ] Left     [  ]  Right    [ x ]  No  Lymph Node Dissection         [  ] Left     [  ]  Right    [ x ]  No  Pacemaker or ICD                   [  ] Left     [  ]  Right    [ x ]  No  Upper Extremity DVT             [  ] Left     [  ]  Right    [ x ]  No  Chronic Kidney Disease         [  ]  Yes     [x  ]  No  Hemodialysis                           [  ]  Yes     [ x ]  No  AV Fistula/ Graft                     [  ]  Left    [  ]  Right    [ x ]  No  Temp>101F in past 24 H       [  ]  Yes     [x  ]  No  H/O PICC/Midline                   [  ]  Yes     [ x ]  No    Lab data:                        13.2   10.08 )-----------( 111      ( 22 Apr 2020 07:20 )             44.7     04-22    146<H>  |  116<H>  |  37<H>  ----------------------------<  109<H>  4.2   |  17<L>  |  0.64    Ca    7.8<L>      22 Apr 2020 07:20  Phos  3.1     04-22  Mg     2.3     04-22    TPro  5.5<L>  /  Alb  1.8<L>  /  TBili  0.4  /  DBili  x   /  AST  43<H>  /  ALT  51<H>  /  AlkPhos  52  04-22      Culture Results:   No growth at 1 day. (04-21 @ 11:53)          I have reviewed the chart, interviewed and examined the patient and determined that this patient:  [  ] Is a candidate for a PICC line  [ x ] Is a candidate for a Midline  [  ] Is not a candidate for vascular access device (reason)    Lumens:    [  ] Single  [ x ] Double

## 2020-04-22 NOTE — PROGRESS NOTE ADULT - PROBLEM SELECTOR PLAN 2
Presented with few weeks of generalized weakness and SOB; At [facility] pt found to be hypoxic to the 80s, now Saturating 98% on RA  - COVID+, contact droplet isolation  - inflammatory markers: ddimer 2122, CRP 25.72-->2, Ferritin 3048; procalcitonin 0.79, lactate 2.9, WBC 21.11 with left shift  - inflammatory markers trending down  -QTC on admission 475  -CXR revealed clear lungs  -confirmed with HCP that previous "no abx" stated on previous MOLST will be reversed   -s/p IV azithromycin 250mg x 5 days (end 4/20)  -s/p solu-medrol 40mg IV BID x 5 days (end 4/20)  -Continue to monitor daily CBC/CMP/Mg/PO4/CRP/Ferritin/D-Dimer  -saturating on RA 94%    #hypercoagulable state 2/2 COVID; b/l DVT +  LE dopplers positive for b/l DVTs   - continue eliquis 2.5mg BID

## 2020-04-22 NOTE — PROGRESS NOTE ADULT - SUBJECTIVE AND OBJECTIVE BOX
OVERNIGHT EVENTS: T 95.7F, heated blankets and hot packs placed. FSG dropped from 93 to 77, received 1/2amp D50. Saturating % RA    SUBJECTIVE / INTERVAL HPI: Patient seen and examined at bedside. Pt being fed breakfast this morning, tolerating oatmeal, but intermittently refusing. Pt follows commands and nods to questions, however does not respond verbally when asked name.     VITAL SIGNS:  Vital Signs Last 24 Hrs  T(C): 35 (22 Apr 2020 06:15), Max: 35.9 (21 Apr 2020 11:26)  T(F): 95 (22 Apr 2020 06:15), Max: 96.6 (21 Apr 2020 11:26)  HR: 65 (22 Apr 2020 06:15) (65 - 85)  BP: 146/83 (22 Apr 2020 06:15) (122/67 - 146/83)  BP(mean): --  RR: 19 (22 Apr 2020 06:15) (19 - 20)  SpO2: 100% (22 Apr 2020 06:15) (97% - 100%)    PHYSICAL EXAM:    General: chronically ill appearing frail elderly female, opening eyes to voice, AOx0  HEENT: NC/AT; PERRL, anicteric sclera; poor oral hygiene, dry lips  Respiratory: no increased work of breathing, breathing comfortably on RA; saturating 98%  Gastrointestinal: soft, NT/ND  : primafit in place draining yellow-orange urine  Extremities: WWP; no edema, chronic vascular changes bilaterally up to knees with scaling, no purulence or exudates  Neurological: opening eyes to voice, AOx0    MEDICATIONS:  MEDICATIONS  (STANDING):  ammonium lactate 12% Lotion 1 Application(s) Topical two times a day  apixaban 2.5 milliGRAM(s) Oral every 12 hours  artificial  tears Solution 1 Drop(s) Both EYES two times a day  dextrose 5% + lactated ringers. 1000 milliLiter(s) (70 mL/Hr) IV Continuous <Continuous>  dextrose 5%. 1000 milliLiter(s) (50 mL/Hr) IV Continuous <Continuous>  dextrose 50% Injectable 12.5 Gram(s) IV Push once  dextrose 50% Injectable 25 Gram(s) IV Push once  dextrose 50% Injectable 25 Gram(s) IV Push once  ertapenem  IVPB 1000 milliGRAM(s) IV Intermittent every 24 hours  insulin lispro (HumaLOG) corrective regimen sliding scale   SubCutaneous Before meals and at bedtime  mirtazapine 7.5 milliGRAM(s) Oral at bedtime    MEDICATIONS  (PRN):  dextrose 40% Gel 15 Gram(s) Oral once PRN Blood Glucose LESS THAN 70 milliGRAM(s)/deciliter  glucagon  Injectable 1 milliGRAM(s) IntraMuscular once PRN Glucose LESS THAN 70 milligrams/deciliter      ALLERGIES:  Allergies    No Known Allergies    Intolerances        LABS:                        13.2   10.08 )-----------( 111      ( 22 Apr 2020 07:20 )             44.7     04-22    146<H>  |  116<H>  |  37<H>  ----------------------------<  109<H>  4.2   |  17<L>  |  0.64    Ca    7.8<L>      22 Apr 2020 07:20  Phos  3.1     04-22  Mg     2.3     04-22    TPro  5.5<L>  /  Alb  1.8<L>  /  TBili  0.4  /  DBili  x   /  AST  43<H>  /  ALT  51<H>  /  AlkPhos  52  04-22        CAPILLARY BLOOD GLUCOSE      POCT Blood Glucose.: 77 mg/dL (22 Apr 2020 08:12)      RADIOLOGY & ADDITIONAL TESTS: Reviewed.

## 2020-04-23 DIAGNOSIS — E46 UNSPECIFIED PROTEIN-CALORIE MALNUTRITION: ICD-10-CM

## 2020-04-23 DIAGNOSIS — R53.81 OTHER MALAISE: ICD-10-CM

## 2020-04-23 DIAGNOSIS — Z51.5 ENCOUNTER FOR PALLIATIVE CARE: ICD-10-CM

## 2020-04-23 LAB
ALBUMIN SERPL ELPH-MCNC: 1.9 G/DL — LOW (ref 3.3–5)
ALP SERPL-CCNC: 51 U/L — SIGNIFICANT CHANGE UP (ref 40–120)
ALT FLD-CCNC: 40 U/L — SIGNIFICANT CHANGE UP (ref 10–45)
ANION GAP SERPL CALC-SCNC: 11 MMOL/L — SIGNIFICANT CHANGE UP (ref 5–17)
ANION GAP SERPL CALC-SCNC: 9 MMOL/L — SIGNIFICANT CHANGE UP (ref 5–17)
AST SERPL-CCNC: 42 U/L — HIGH (ref 10–40)
BASOPHILS # BLD AUTO: 0 K/UL — SIGNIFICANT CHANGE UP (ref 0–0.2)
BASOPHILS NFR BLD AUTO: 0 % — SIGNIFICANT CHANGE UP (ref 0–2)
BILIRUB SERPL-MCNC: 0.3 MG/DL — SIGNIFICANT CHANGE UP (ref 0.2–1.2)
BUN SERPL-MCNC: 25 MG/DL — HIGH (ref 7–23)
BUN SERPL-MCNC: 30 MG/DL — HIGH (ref 7–23)
CALCIUM SERPL-MCNC: 7.4 MG/DL — LOW (ref 8.4–10.5)
CALCIUM SERPL-MCNC: 7.5 MG/DL — LOW (ref 8.4–10.5)
CHLORIDE SERPL-SCNC: 116 MMOL/L — HIGH (ref 96–108)
CHLORIDE SERPL-SCNC: 118 MMOL/L — HIGH (ref 96–108)
CO2 SERPL-SCNC: 22 MMOL/L — SIGNIFICANT CHANGE UP (ref 22–31)
CO2 SERPL-SCNC: 22 MMOL/L — SIGNIFICANT CHANGE UP (ref 22–31)
CREAT SERPL-MCNC: 0.63 MG/DL — SIGNIFICANT CHANGE UP (ref 0.5–1.3)
CREAT SERPL-MCNC: 0.64 MG/DL — SIGNIFICANT CHANGE UP (ref 0.5–1.3)
CRP SERPL-MCNC: 2.26 MG/DL — HIGH (ref 0–0.4)
D DIMER BLD IA.RAPID-MCNC: 1593 NG/ML DDU — HIGH
EOSINOPHIL # BLD AUTO: 0 K/UL — SIGNIFICANT CHANGE UP (ref 0–0.5)
EOSINOPHIL NFR BLD AUTO: 0 % — SIGNIFICANT CHANGE UP (ref 0–6)
FERRITIN SERPL-MCNC: 818 NG/ML — HIGH (ref 15–150)
GLUCOSE BLDC GLUCOMTR-MCNC: 112 MG/DL — HIGH (ref 70–99)
GLUCOSE BLDC GLUCOMTR-MCNC: 117 MG/DL — HIGH (ref 70–99)
GLUCOSE BLDC GLUCOMTR-MCNC: 170 MG/DL — HIGH (ref 70–99)
GLUCOSE BLDC GLUCOMTR-MCNC: 72 MG/DL — SIGNIFICANT CHANGE UP (ref 70–99)
GLUCOSE BLDC GLUCOMTR-MCNC: 79 MG/DL — SIGNIFICANT CHANGE UP (ref 70–99)
GLUCOSE BLDC GLUCOMTR-MCNC: 84 MG/DL — SIGNIFICANT CHANGE UP (ref 70–99)
GLUCOSE BLDC GLUCOMTR-MCNC: 90 MG/DL — SIGNIFICANT CHANGE UP (ref 70–99)
GLUCOSE SERPL-MCNC: 100 MG/DL — HIGH (ref 70–99)
GLUCOSE SERPL-MCNC: 99 MG/DL — SIGNIFICANT CHANGE UP (ref 70–99)
HCT VFR BLD CALC: 33 % — LOW (ref 34.5–45)
HGB BLD-MCNC: 10.4 G/DL — LOW (ref 11.5–15.5)
LACTATE SERPL-SCNC: 2.2 MMOL/L — HIGH (ref 0.5–2)
LYMPHOCYTES # BLD AUTO: 0.19 K/UL — LOW (ref 1–3.3)
LYMPHOCYTES # BLD AUTO: 1.7 % — LOW (ref 13–44)
MAGNESIUM SERPL-MCNC: 1.9 MG/DL — SIGNIFICANT CHANGE UP (ref 1.6–2.6)
MCHC RBC-ENTMCNC: 29.3 PG — SIGNIFICANT CHANGE UP (ref 27–34)
MCHC RBC-ENTMCNC: 31.5 GM/DL — LOW (ref 32–36)
MCV RBC AUTO: 93 FL — SIGNIFICANT CHANGE UP (ref 80–100)
MONOCYTES # BLD AUTO: 0.1 K/UL — SIGNIFICANT CHANGE UP (ref 0–0.9)
MONOCYTES NFR BLD AUTO: 0.9 % — LOW (ref 2–14)
NEUTROPHILS # BLD AUTO: 11.02 K/UL — HIGH (ref 1.8–7.4)
NEUTROPHILS NFR BLD AUTO: 97.4 % — HIGH (ref 43–77)
PHOSPHATE SERPL-MCNC: 2 MG/DL — LOW (ref 2.5–4.5)
PLATELET # BLD AUTO: 123 K/UL — LOW (ref 150–400)
POTASSIUM SERPL-MCNC: 3.5 MMOL/L — SIGNIFICANT CHANGE UP (ref 3.5–5.3)
POTASSIUM SERPL-MCNC: 3.9 MMOL/L — SIGNIFICANT CHANGE UP (ref 3.5–5.3)
POTASSIUM SERPL-SCNC: 3.5 MMOL/L — SIGNIFICANT CHANGE UP (ref 3.5–5.3)
POTASSIUM SERPL-SCNC: 3.9 MMOL/L — SIGNIFICANT CHANGE UP (ref 3.5–5.3)
PROT SERPL-MCNC: 4.6 G/DL — LOW (ref 6–8.3)
RBC # BLD: 3.55 M/UL — LOW (ref 3.8–5.2)
RBC # FLD: 13.9 % — SIGNIFICANT CHANGE UP (ref 10.3–14.5)
SODIUM SERPL-SCNC: 149 MMOL/L — HIGH (ref 135–145)
SODIUM SERPL-SCNC: 149 MMOL/L — HIGH (ref 135–145)
WBC # BLD: 11.31 K/UL — HIGH (ref 3.8–10.5)
WBC # FLD AUTO: 11.31 K/UL — HIGH (ref 3.8–10.5)

## 2020-04-23 PROCEDURE — 99233 SBSQ HOSP IP/OBS HIGH 50: CPT | Mod: CS,GC

## 2020-04-23 PROCEDURE — 99233 SBSQ HOSP IP/OBS HIGH 50: CPT

## 2020-04-23 PROCEDURE — 99358 PROLONG SERVICE W/O CONTACT: CPT

## 2020-04-23 RX ORDER — SODIUM CHLORIDE 9 MG/ML
1000 INJECTION, SOLUTION INTRAVENOUS
Refills: 0 | Status: DISCONTINUED | OUTPATIENT
Start: 2020-04-23 | End: 2020-04-24

## 2020-04-23 RX ORDER — DEXTROSE 50 % IN WATER 50 %
25 SYRINGE (ML) INTRAVENOUS ONCE
Refills: 0 | Status: COMPLETED | OUTPATIENT
Start: 2020-04-23 | End: 2020-04-23

## 2020-04-23 RX ORDER — POTASSIUM CHLORIDE 20 MEQ
10 PACKET (EA) ORAL ONCE
Refills: 0 | Status: COMPLETED | OUTPATIENT
Start: 2020-04-23 | End: 2020-04-23

## 2020-04-23 RX ORDER — DEXTROSE 50 % IN WATER 50 %
12.5 SYRINGE (ML) INTRAVENOUS ONCE
Refills: 0 | Status: COMPLETED | OUTPATIENT
Start: 2020-04-23 | End: 2020-04-23

## 2020-04-23 RX ORDER — POTASSIUM CHLORIDE 20 MEQ
10 PACKET (EA) ORAL
Refills: 0 | Status: COMPLETED | OUTPATIENT
Start: 2020-04-23 | End: 2020-04-23

## 2020-04-23 RX ORDER — ENOXAPARIN SODIUM 100 MG/ML
45 INJECTION SUBCUTANEOUS EVERY 24 HOURS
Refills: 0 | Status: DISCONTINUED | OUTPATIENT
Start: 2020-04-24 | End: 2020-04-24

## 2020-04-23 RX ADMIN — Medication 100 MILLIEQUIVALENT(S): at 11:40

## 2020-04-23 RX ADMIN — APIXABAN 2.5 MILLIGRAM(S): 2.5 TABLET, FILM COATED ORAL at 10:02

## 2020-04-23 RX ADMIN — Medication 100 MILLIEQUIVALENT(S): at 18:23

## 2020-04-23 RX ADMIN — Medication 25 MILLILITER(S): at 08:23

## 2020-04-23 RX ADMIN — SODIUM CHLORIDE 115 MILLILITER(S): 9 INJECTION, SOLUTION INTRAVENOUS at 18:23

## 2020-04-23 RX ADMIN — Medication 1 APPLICATION(S): at 04:33

## 2020-04-23 RX ADMIN — Medication 1 TABLET(S): at 10:02

## 2020-04-23 RX ADMIN — Medication 100 MILLIEQUIVALENT(S): at 10:02

## 2020-04-23 RX ADMIN — Medication 1 DROP(S): at 18:23

## 2020-04-23 RX ADMIN — Medication 1: at 22:55

## 2020-04-23 RX ADMIN — CHLORHEXIDINE GLUCONATE 1 APPLICATION(S): 213 SOLUTION TOPICAL at 04:34

## 2020-04-23 RX ADMIN — ERTAPENEM SODIUM 120 MILLIGRAM(S): 1 INJECTION, POWDER, LYOPHILIZED, FOR SOLUTION INTRAMUSCULAR; INTRAVENOUS at 11:39

## 2020-04-23 RX ADMIN — Medication 1 DROP(S): at 04:33

## 2020-04-23 RX ADMIN — SODIUM CHLORIDE 90 MILLILITER(S): 9 INJECTION, SOLUTION INTRAVENOUS at 03:49

## 2020-04-23 RX ADMIN — Medication 12.5 MILLILITER(S): at 04:34

## 2020-04-23 RX ADMIN — SODIUM CHLORIDE 115 MILLILITER(S): 9 INJECTION, SOLUTION INTRAVENOUS at 22:39

## 2020-04-23 RX ADMIN — Medication 1 APPLICATION(S): at 18:23

## 2020-04-23 RX ADMIN — SODIUM CHLORIDE 100 MILLILITER(S): 9 INJECTION, SOLUTION INTRAVENOUS at 10:02

## 2020-04-23 NOTE — SWALLOW BEDSIDE ASSESSMENT ADULT - NS SPL SWALLOW CLINIC TRIAL FT
Oral phase was significant for reduced oral grading of spoon, poor bolus control, prolonged and poor manipulation and transport of bolus through oral cavity. Pt w/ wet cough following trial, suggestive of aspiration. Hyolaryngeal elevation was significantly diminished upon palpation, indicative of inefficient/incomplete or absent pharyngeal swallow trigger. Trials were terminated.
Oral phase was characterized by reduced oral grading of spoon, mild anterior spillage w/ thins via tsp and cup, and prolonged and reduced bolus formation/manipulation and A-P transport. No overt signs of aspiration observed.

## 2020-04-23 NOTE — SWALLOW BEDSIDE ASSESSMENT ADULT - SWALLOW EVAL: DIAGNOSIS
Eval was limited 2/2 generalized weakness and lethargy, poor clinical presentation, and poor secretion management. Pt is at high risk for aspiration. D/w MD; GOC discussion is on-going. Due to refusal of PO intake w/ impaired and inefficient swallow function, suspect pt will not be able to meet nutritional/hydrational needs via oral diet. If GOC are to avoid prandial aspiration, recs for NPO w/ alternative means of nutrition/hydration/medication. If GOC are for comfort, allow comfort feeds of puree and thin liquids as tolerated. Eval was limited 2/2 generalized weakness and lethargy, poor clinical presentation, and poor secretion management. Pt is at high risk for aspiration. D/w MD; GOC discussion is on-going. Due to refusal of PO intake w/ impaired and inefficient swallow function, suspect pt will not be able to meet nutritional/hydrational needs via oral diet. If GOC are to avoid prandial aspiration, recs for NPO w/ alternative means of nutrition/hydration/medication. If GOC are for comfort, allow comfort feeds of puree and thin liquids as tolerated after thorough oral care.

## 2020-04-23 NOTE — CONSULT NOTE ADULT - ASSESSMENT
89 year old woman with Debility, Protein calorie malnutrition, UTI, COVID 19 and encounter for palliative care.

## 2020-04-23 NOTE — CONSULT NOTE ADULT - PROBLEM SELECTOR RECOMMENDATION 3
Patient found to be in urosepsis and also to have  ESBL resistant to Cedftraxone and started on IV ertapenem 1g q24h 4/18 (end 4/24). Continue care as per primary team.

## 2020-04-23 NOTE — PROGRESS NOTE ADULT - SUBJECTIVE AND OBJECTIVE BOX
OVERNIGHT EVENTS:   SUBJECTIVE / INTERVAL HPI: Patient seen and examined at bedside. Hypothermia resolved with joselyn hugger. hypoglycemic yesterday PM and this AM. Given 1amp D50 yesterday, 1/2amp overnight and 1/2amp this morning. Lactate yesterday elevated, given 500cc NS bolus. Pt lost IV access (third time IV was placed ultrasound guided), pt consented for midline yesterday via HCP (Radha Johns). RN notes pt not taking in very good po, unsure if pt swallowing meds. Pt with very poor po intake. No mental status this morning, not opening eyes when called by name. Saturating 98% RA    VITAL SIGNS:  Vital Signs Last 24 Hrs  T(C): 36.9 (23 Apr 2020 06:17), Max: 36.9 (23 Apr 2020 06:17)  T(F): 98.4 (23 Apr 2020 06:17), Max: 98.4 (23 Apr 2020 06:17)  HR: 78 (23 Apr 2020 06:17) (75 - 91)  BP: 132/79 (23 Apr 2020 06:17) (90/61 - 146/78)  BP(mean): --  RR: 22 (23 Apr 2020 06:17) (17 - 26)  SpO2: 98% (23 Apr 2020 06:17) (98% - 98%)    PHYSICAL EXAM:    General: chronically ill appearing frail elderly female, does not open eyes to voice, AOx0  HEENT: NC/AT; PERRL, anicteric sclera; poor oral hygiene, dry lips  Respiratory: no increased work of breathing, breathing comfortably on RA; saturating 98%  Gastrointestinal: soft, NT/ND  : primafit in place draining yellow-orange urine  Extremities: WWP; no edema, chronic vascular changes bilaterally up to knees with scaling, no purulence or exudates  Neurological: does not open eyes to voice, AOx0    MEDICATIONS:  MEDICATIONS  (STANDING):  ammonium lactate 12% Lotion 1 Application(s) Topical two times a day  apixaban 2.5 milliGRAM(s) Oral every 12 hours  artificial  tears Solution 1 Drop(s) Both EYES two times a day  chlorhexidine 2% Cloths 1 Application(s) Topical <User Schedule>  dextrose 10% + sodium chloride 0.45%. 1000 milliLiter(s) (100 mL/Hr) IV Continuous <Continuous>  dextrose 5%. 1000 milliLiter(s) (50 mL/Hr) IV Continuous <Continuous>  dextrose 50% Injectable 12.5 Gram(s) IV Push once  dextrose 50% Injectable 25 Gram(s) IV Push once  dextrose 50% Injectable 25 Gram(s) IV Push once  ertapenem  IVPB 1000 milliGRAM(s) IV Intermittent every 24 hours  insulin lispro (HumaLOG) corrective regimen sliding scale   SubCutaneous Before meals and at bedtime  mirtazapine 15 milliGRAM(s) Oral at bedtime  multivitamin 1 Tablet(s) Oral daily  potassium chloride  10 mEq/100 mL IVPB 10 milliEquivalent(s) IV Intermittent every 1 hour    MEDICATIONS  (PRN):  dextrose 40% Gel 15 Gram(s) Oral once PRN Blood Glucose LESS THAN 70 milliGRAM(s)/deciliter  glucagon  Injectable 1 milliGRAM(s) IntraMuscular once PRN Glucose LESS THAN 70 milligrams/deciliter  sodium chloride 0.9% lock flush 10 milliLiter(s) IV Push every 1 hour PRN Pre/post blood products, medications, blood draw, and to maintain line patency      ALLERGIES:  Allergies    No Known Allergies    Intolerances        LABS:                        10.4   11.31 )-----------( 123      ( 23 Apr 2020 06:16 )             33.0     04-23    149<H>  |  118<H>  |  30<H>  ----------------------------<  100<H>  3.5   |  22  |  0.64    Ca    7.5<L>      23 Apr 2020 06:16  Phos  2.0     04-23  Mg     1.9     04-23    TPro  4.6<L>  /  Alb  1.9<L>  /  TBili  0.3  /  DBili  x   /  AST  42<H>  /  ALT  40  /  AlkPhos  51  04-23        CAPILLARY BLOOD GLUCOSE      POCT Blood Glucose.: 117 mg/dL (23 Apr 2020 08:38)      RADIOLOGY & ADDITIONAL TESTS: Reviewed.

## 2020-04-23 NOTE — PROGRESS NOTE ADULT - PROBLEM SELECTOR PLAN 2
Presented with few weeks of generalized weakness and SOB; At [facility] pt found to be hypoxic to the 80s, now Saturating 98% on RA  - COVID+, contact droplet isolation  - inflammatory markers: ddimer 2122, CRP 25.72-->2, Ferritin 3048; procalcitonin 0.79, lactate 2.9, WBC 21.11 with left shift  - inflammatory markers trending down  -QTC on admission 475  -CXR revealed clear lungs  -confirmed with HCP that previous "no abx" stated on previous MOLST will be reversed   -s/p IV azithromycin 250mg x 5 days (end 4/20)  -s/p solu-medrol 40mg IV BID x 5 days (end 4/20)  -Continue to monitor daily CBC/CMP/Mg/PO4/CRP/Ferritin/D-Dimer  -saturating on RA 94%    #hypercoagulable state 2/2 COVID; b/l DVT +  LE dopplers positive for b/l DVTs   - continue eliquis 2.5mg BID

## 2020-04-23 NOTE — SWALLOW BEDSIDE ASSESSMENT ADULT - MUCOSAL QUALITY
Oral mucosa was very dry, w/ copious dried and wet blood-tinged secretions on labial surface, gums, and on lingual surface. Thorough oral care was provided. Pt grimaced in pain in response to light touch of oral swab to tongue. MD notified.
Copious dried secretions diffuse in oral cavity. Oral care provided prior to PO trials.

## 2020-04-23 NOTE — PROGRESS NOTE ADULT - ATTENDING COMMENTS
Patient seen and examined. Discussed case with housestaff. Agree with above assessment and plan with addedndum:    Patient minimally responsive. Alert to verbal and tactile stimuli but unable to answer questions. Appears comfortable. Saturating well on room air.   Hypernatremia likely due to decreased PO intake. Na 149 with FWD 1.2L. Patient also with hypoglycemia which has been refractory to D5. Change IVF to D101/2NS. Will check BMP later today for Na correction. Palliative consulted for ongoing GOC with family. Pt DNR/DNI but still wants to continue with care. Continue to treat UTI with Ertapenem for ESBL E Coli.

## 2020-04-23 NOTE — SWALLOW BEDSIDE ASSESSMENT ADULT - SLP GENERAL OBSERVATIONS
Pt received awake in bed, lethargic. Few attempts to verbalize, though voice was largely aphonic and unintelligible. Pt nodded her head yes/no intermittently in response to questions.
Pt received asleep in bed, roused to verbal/tactile stim. Pt sustained arousal/alertness throughout eval. Pt unreliably followed 1-step directives. Pt did not reliably answer basic yes/no questions. Pt w/ limited verbal expression; however, pt did intermittently and spontaneously produce 1-2 word utterances (i.e. "thank you"). Voice was aphonic.

## 2020-04-23 NOTE — CONSULT NOTE ADULT - PROBLEM SELECTOR RECOMMENDATION 5
Patient is a DNR/DNI. She continues to not have a mental status which is outside of her baseline. Her family which is her niece, do not want her to suffer. She has wound on her back as per the primary team and given her poor nutrtional status might benefit from [facility] for wound care. If [facility] fails to accept patient, can try [facility] with [facility] hospice services if she will be accepted.   As discussed during the palliative IDT meeting, the patients PSSA screening did not identify any current psychosocial need or spiritual support deficits.

## 2020-04-23 NOTE — PROGRESS NOTE ADULT - PROBLEM SELECTOR PLAN 7
Improving. Plt 123 this AM. PLT 71 on admission likely in setting of COVID and acute infection as pt with normal plt in 07/2018.  -Continue to monitor for now and transfuse platelets if less than 10K or less than 50K w/ bleeding  - continue eliquis 2.5mg BID  - fibrinogen elevated--not DIC    #anemia   Pt with drop in Hgb to 10.4 this AM from 13.2 prior; likely 2/2 bolus fluids and this is pt's baseline. No signs of bleeding and hemodynamically stable  - continue to monitor

## 2020-04-23 NOTE — PROGRESS NOTE ADULT - ASSESSMENT
89yo Female, DNR/DNI (no abx or tube feeds), with PMHx of HTN, HLD, dCHF and generalized anxiety disorder who was brought to [facility] ED from [facility] after pt was found to be hypoxic to the 80s on RA and hypotensive.COVID+, RETA and dehydrated, pt now admitted to [facility] for further management.

## 2020-04-23 NOTE — SWALLOW BEDSIDE ASSESSMENT ADULT - ASR SWALLOW ASPIRATION MONITOR
cough/pneumonia/gurgly voice/upper respiratory infection/change of breathing pattern/oral hygiene/position upright (90Y)/fever/throat clearing
oral hygiene/position upright (90Y)/gurgly voice/change of breathing pattern/fever/throat clearing/upper respiratory infection/cough/pneumonia

## 2020-04-23 NOTE — PROGRESS NOTE ADULT - PROBLEM SELECTOR PLAN 3
Sepsis 2/2 UTI due to ESBL Ecoli c/b metabolic encephalopathy   - s/p 2 doses of IV CTX 1g q24h, however ESBL resistant to CTX; switched to IV ertapenem 1g q24h 4/18 (end 4/24)  - blood cultures NGTD

## 2020-04-23 NOTE — CONSULT NOTE ADULT - PROBLEM SELECTOR RECOMMENDATION 2
Patient with issues swallowing and also with an albumin of 1.9. Appreciate nutritional involvement and would NOT recommend feeding tube for the patient.

## 2020-04-23 NOTE — PROGRESS NOTE ADULT - PROBLEM SELECTOR PLAN 10
F: D10+1/2NS @100cc/hr  E: K >4, Mag >2  N: dysphagia; pureed and thin liquids per speech/swallow  GI: Famotidine 20mg daily  DVT ppx: eliquis 2.5mg bid  R upper arm midline  Code Status: DNR/DNI

## 2020-04-23 NOTE — PROVIDER CONTACT NOTE (CRITICAL VALUE NOTIFICATION) - SITUATION
Finger stick 57 checked by PCA. Patient asymptomatic. Dr Patel aware. 1 amp D50 administered, continuous IV fluids changed. FS recheck 143.
Critical value called to floor because of change. Hgb went from 13.9 to 10.5. Dr. Smiley made aware
MCV 93
Patients platelets= 74 yesterday, avsvg=[phone].

## 2020-04-23 NOTE — PROGRESS NOTE ADULT - PROBLEM SELECTOR PLAN 4
Na 159 on admission, s/p NS IVF 500cc bolus in ED. Likely 2/2 dehydration as evidenced by labs; 149 this AM (increasing from prior)   - s/p midline placement 4/22  - start D10+1/2NS @100cc/hr for hypernatremia    #persistent hypoglycemia   Pt taking in very little po, if at all leading to hypoglycemia, requiring multiple amps of D50 throughout the day and night.   - increased dextrose concentration of fluids to D10+1/2NS @100cc/hr

## 2020-04-23 NOTE — CHART NOTE - NSCHARTNOTEFT_GEN_A_CORE
PALLIATIVE MEDICINE COORDINATION OF CARE NOTE FOR MIKY DE JESUS  [  ] ED trigger    [  ] MICU trigger    [x  ] Consult    Patient will be seen as full consult within 24h. Consult requested for: ___GOC_____    __30____ Minutes; Start: __900___  End: _930___, of non-face-to-face prolonged service provided that relates to (face-to-face) care that has or will occur and ongoing patient management, including one or more of the following:     - Reviewed records from other physicians or other health care professional services, including one or more of the following: other medical records and diagnostic / radiology study results     HPI:  89yo Female, DNR/DNI (no abx, no tube feeds), with PMHx of HTN, HLD, dCHF and generalized anxiety disorder who was brought to [facility] ED from [facility] home after pt was found to be hypoxic to the 80s on RA and hypotensive. Pt endorses generalized weakness x few weeks and per facility nursing report pt has been dyspneic for a few days. Denies cough, congestion, fever/chills, myalgias, abd pain, N/V/D, CP, palpitations, LE edema or calf tenderness.  On arrival to ED, T 97.4, HR 84bpm, /62, RR 14, SpO2 99% RA; Labs significant for WBC 21.11 with left shift, PLT 71, Ddimer 3667, CRP 25.72, Ferritin 3048, Lactate 2.9, procal 0.79, Na 159, Cl 120, BUN/Cr 86/1.61, , Trop 0.04; CXR revealed clear lungs and COVID PCR positive. Pt received NS IVF 500cc bolus. Pt now admitted to [facility] for further management of COVID, RETA, and dehydration.    Date of onset of fever: NONE  Date of onset of dyspnea: few days  Recent Travel: NONE  Sick Contacts/COVID exposure: possible, lives in [facility] nursing facility    ROS:  Fevers: N  Malaise: Y  Myalgias: N  SOB: Y      Cough: N         Productive: N  Nausea: N  Vomiting: N  Diarrhea: N    PMHx:   HTN: Y  DM: N  Lung Disease: N  Cardiovascular disease: N  Malignancy: N  Immunosuppression: N  HIV: N  CKD/ESRD: N  Chronic Liver Disease: N (16 Apr 2020 03:05)      - Other: iStop reviewed.     NO Rx found on iStop review. Ref #:  [id number]      - Other: Medication reviewed.    The patient HAS NOT used PRN's in the last 24h.    MEDICATIONS  (STANDING):  ammonium lactate 12% Lotion 1 Application(s) Topical two times a day  apixaban 2.5 milliGRAM(s) Oral every 12 hours  artificial  tears Solution 1 Drop(s) Both EYES two times a day  chlorhexidine 2% Cloths 1 Application(s) Topical <User Schedule>  dextrose 10% + sodium chloride 0.45%. 1000 milliLiter(s) (100 mL/Hr) IV Continuous <Continuous>  dextrose 5%. 1000 milliLiter(s) (50 mL/Hr) IV Continuous <Continuous>  dextrose 50% Injectable 12.5 Gram(s) IV Push once  dextrose 50% Injectable 25 Gram(s) IV Push once  dextrose 50% Injectable 25 Gram(s) IV Push once  ertapenem  IVPB 1000 milliGRAM(s) IV Intermittent every 24 hours  insulin lispro (HumaLOG) corrective regimen sliding scale   SubCutaneous Before meals and at bedtime  mirtazapine 15 milliGRAM(s) Oral at bedtime  multivitamin 1 Tablet(s) Oral daily  potassium chloride  10 mEq/100 mL IVPB 10 milliEquivalent(s) IV Intermittent every 1 hour    MEDICATIONS  (PRN):  dextrose 40% Gel 15 Gram(s) Oral once PRN Blood Glucose LESS THAN 70 milliGRAM(s)/deciliter  glucagon  Injectable 1 milliGRAM(s) IntraMuscular once PRN Glucose LESS THAN 70 milligrams/deciliter  sodium chloride 0.9% lock flush 10 milliLiter(s) IV Push every 1 hour PRN Pre/post blood products, medications, blood draw, and to maintain line patency      - Other: Advanced directives  Patient is a DNR/DNI.   INCOMPLETE MOLST Scanned into Alpha this admision page 1/2  No HCP form found but Radha [phone] (niece, HCP) is the documented contact in the chart       - Other: Coordination/Plan of care     Discussed with primary team    Patient is positive for COVID 19 and urosepsis. There was a goals of care conversation held with the family yesterday that stated that the patient is a DNR/DNI with decreased PO intake. They wanted a Midline placed for IV antiobiotics and IV Fluids and are thinking further for rest of GOC decisions.   Palliative consulted to assist with GOC.  Given constraints in Palliative IDT personal 2/2 COVID-19 I will be peripherally monitoring patients and continue to coordinate care recommendations with providers and family members over the phone. All active palliative members (Stefanie Magallon, José Miguel Emery, Robert Salvador NP and Dr Gerardo Burnette) are available via Microsoft Teams chat / conference call. PALLIATIVE MEDICINE COORDINATION OF CARE NOTE FOR MIKY DE JESUS  [  ] ED trigger    [  ] MICU trigger    [x  ] Consult    Patient will be seen as full consult within 24h. Consult requested for: ___GOC_____    __30____ Minutes; Start: __900___  End: _930___, of non-face-to-face prolonged service provided that relates to (face-to-face) care that has or will occur and ongoing patient management, including one or more of the following:     - Reviewed records from other physicians or other health care professional services, including one or more of the following: other medical records and diagnostic / radiology study results     HPI:  89yo Female, DNR/DNI (no abx, no tube feeds), with PMHx of HTN, HLD, dCHF and generalized anxiety disorder who was brought to [facility] ED from [facility] home after pt was found to be hypoxic to the 80s on RA and hypotensive. Pt endorses generalized weakness x few weeks and per facility nursing report pt has been dyspneic for a few days. Denies cough, congestion, fever/chills, myalgias, abd pain, N/V/D, CP, palpitations, LE edema or calf tenderness.  On arrival to ED, T 97.4, HR 84bpm, /62, RR 14, SpO2 99% RA; Labs significant for WBC 21.11 with left shift, PLT 71, Ddimer 3667, CRP 25.72, Ferritin 3048, Lactate 2.9, procal 0.79, Na 159, Cl 120, BUN/Cr 86/1.61, , Trop 0.04; CXR revealed clear lungs and COVID PCR positive. Pt received NS IVF 500cc bolus. Pt now admitted to [facility] for further management of COVID, RETA, and dehydration.    Date of onset of fever: NONE  Date of onset of dyspnea: few days  Recent Travel: NONE  Sick Contacts/COVID exposure: possible, lives in [facility] nursing facility    ROS:  Fevers: N  Malaise: Y  Myalgias: N  SOB: Y      Cough: N         Productive: N  Nausea: N  Vomiting: N  Diarrhea: N    PMHx:   HTN: Y  DM: N  Lung Disease: N  Cardiovascular disease: N  Malignancy: N  Immunosuppression: N  HIV: N  CKD/ESRD: N  Chronic Liver Disease: N (16 Apr 2020 03:05)      - Other: iStop reviewed.     NO Rx found on iStop review. Ref #:  [id number]      - Other: Medication reviewed.    The patient HAS NOT used PRN's in the last 24h.    MEDICATIONS  (STANDING):  ammonium lactate 12% Lotion 1 Application(s) Topical two times a day  apixaban 2.5 milliGRAM(s) Oral every 12 hours  artificial  tears Solution 1 Drop(s) Both EYES two times a day  chlorhexidine 2% Cloths 1 Application(s) Topical <User Schedule>  dextrose 10% + sodium chloride 0.45%. 1000 milliLiter(s) (100 mL/Hr) IV Continuous <Continuous>  dextrose 5%. 1000 milliLiter(s) (50 mL/Hr) IV Continuous <Continuous>  dextrose 50% Injectable 12.5 Gram(s) IV Push once  dextrose 50% Injectable 25 Gram(s) IV Push once  dextrose 50% Injectable 25 Gram(s) IV Push once  ertapenem  IVPB 1000 milliGRAM(s) IV Intermittent every 24 hours  insulin lispro (HumaLOG) corrective regimen sliding scale   SubCutaneous Before meals and at bedtime  mirtazapine 15 milliGRAM(s) Oral at bedtime  multivitamin 1 Tablet(s) Oral daily  potassium chloride  10 mEq/100 mL IVPB 10 milliEquivalent(s) IV Intermittent every 1 hour    MEDICATIONS  (PRN):  dextrose 40% Gel 15 Gram(s) Oral once PRN Blood Glucose LESS THAN 70 milliGRAM(s)/deciliter  glucagon  Injectable 1 milliGRAM(s) IntraMuscular once PRN Glucose LESS THAN 70 milligrams/deciliter  sodium chloride 0.9% lock flush 10 milliLiter(s) IV Push every 1 hour PRN Pre/post blood products, medications, blood draw, and to maintain line patency      - Other: Advanced directives  Patient is a DNR/DNI.   INCOMPLETE MOLST Scanned into Alpha this admision page 1/2  No HCP form found but Radha [phone] (niece, HCP) is the documented contact in the chart       - Other: Coordination/Plan of care     Discussed with primary team    Patient is positive for COVID 19 and urosepsis. There was a goals of care conversation held with the family yesterday that stated that the patient is a DNR/DNI with decreased PO intake. They wanted a Midline placed for IV antiobiotics and IV Fluids and are thinking further for rest of GOC decisions.   Palliative consulted to assist with GOC.  Given constraints in Palliative IDT personal 2/2 COVID-19 I will be peripherally monitoring patients and continue to coordinate care recommendations with providers and family members over the phone. All active palliative members (Stefanie Magallon, José Miguel Emery, Robert Salvador NP and Dr Gerardo Burnette) are available via [employer] Teams chat / conference call.      PSYCHOSOCIAL ADDEDNUM  Pt is 89 yo, Covid +, with urosepsis and came in from [facility] where she is a long term care resident.  Over the past few days pt is having mental status changes and has gone from baseline of A+Ox3 to barely responding to her name.  She has a niece, Radha De Jesus ([phone]) who is her HCP along with a longtime good friend, Milka Price ([phone]).  They are making decisions together.      I spoke with Radha at length today answering her questions and helping her make decisions for next steps.  She was concerned about midline placement being too aggressive and I reframed it as necessary to pt's comfort (Pt lost three IV lines over the past few days and is "hard stick").   I explained to martell that Pt does not meet criteria for inpatient hospice at [facility] however might be best cared for at [facility].  After conferring with Milka, martell called me back to agree to applying to [facility].    I provided support and counseling and will continue as needed.    Stefanie Magallon MA  Patient and Family Support

## 2020-04-23 NOTE — SWALLOW BEDSIDE ASSESSMENT ADULT - COMMENTS
Pt was initially [place] by this [facility] [place], w/ recs for puree w/ thin liquids and thorough oral care.

## 2020-04-23 NOTE — SWALLOW BEDSIDE ASSESSMENT ADULT - SPECIFY REASON(S)
To re-assess swallow function in setting of worsened mental status and poor PO intake w/ malnutrition and dehydration

## 2020-04-24 VITALS
OXYGEN SATURATION: 96 % | SYSTOLIC BLOOD PRESSURE: 101 MMHG | TEMPERATURE: 99 F | HEART RATE: 99 BPM | DIASTOLIC BLOOD PRESSURE: 60 MMHG | RESPIRATION RATE: 18 BRPM

## 2020-04-24 DIAGNOSIS — R62.7 ADULT FAILURE TO THRIVE: ICD-10-CM

## 2020-04-24 LAB
ALBUMIN SERPL ELPH-MCNC: 1.6 G/DL — LOW (ref 3.3–5)
ALP SERPL-CCNC: 50 U/L — SIGNIFICANT CHANGE UP (ref 40–120)
ALT FLD-CCNC: 31 U/L — SIGNIFICANT CHANGE UP (ref 10–45)
ANION GAP SERPL CALC-SCNC: 10 MMOL/L — SIGNIFICANT CHANGE UP (ref 5–17)
ANION GAP SERPL CALC-SCNC: 7 MMOL/L — SIGNIFICANT CHANGE UP (ref 5–17)
AST SERPL-CCNC: 28 U/L — SIGNIFICANT CHANGE UP (ref 10–40)
BASOPHILS # BLD AUTO: 0.01 K/UL — SIGNIFICANT CHANGE UP (ref 0–0.2)
BASOPHILS NFR BLD AUTO: 0.1 % — SIGNIFICANT CHANGE UP (ref 0–2)
BILIRUB SERPL-MCNC: 0.4 MG/DL — SIGNIFICANT CHANGE UP (ref 0.2–1.2)
BUN SERPL-MCNC: 17 MG/DL — SIGNIFICANT CHANGE UP (ref 7–23)
BUN SERPL-MCNC: 19 MG/DL — SIGNIFICANT CHANGE UP (ref 7–23)
CALCIUM SERPL-MCNC: 7 MG/DL — LOW (ref 8.4–10.5)
CALCIUM SERPL-MCNC: 7 MG/DL — LOW (ref 8.4–10.5)
CHLORIDE SERPL-SCNC: 111 MMOL/L — HIGH (ref 96–108)
CHLORIDE SERPL-SCNC: 115 MMOL/L — HIGH (ref 96–108)
CO2 SERPL-SCNC: 21 MMOL/L — LOW (ref 22–31)
CO2 SERPL-SCNC: 22 MMOL/L — SIGNIFICANT CHANGE UP (ref 22–31)
CREAT SERPL-MCNC: 0.59 MG/DL — SIGNIFICANT CHANGE UP (ref 0.5–1.3)
CREAT SERPL-MCNC: 0.64 MG/DL — SIGNIFICANT CHANGE UP (ref 0.5–1.3)
CRP SERPL-MCNC: 4 MG/DL — HIGH (ref 0–0.4)
D DIMER BLD IA.RAPID-MCNC: 1736 NG/ML DDU — HIGH
EOSINOPHIL # BLD AUTO: 0.23 K/UL — SIGNIFICANT CHANGE UP (ref 0–0.5)
EOSINOPHIL NFR BLD AUTO: 2.5 % — SIGNIFICANT CHANGE UP (ref 0–6)
FERRITIN SERPL-MCNC: 1132 NG/ML — HIGH (ref 15–150)
GLUCOSE BLDC GLUCOMTR-MCNC: 154 MG/DL — HIGH (ref 70–99)
GLUCOSE BLDC GLUCOMTR-MCNC: 182 MG/DL — HIGH (ref 70–99)
GLUCOSE SERPL-MCNC: 125 MG/DL — HIGH (ref 70–99)
GLUCOSE SERPL-MCNC: 91 MG/DL — SIGNIFICANT CHANGE UP (ref 70–99)
HCT VFR BLD CALC: 31.4 % — LOW (ref 34.5–45)
HGB BLD-MCNC: 10 G/DL — LOW (ref 11.5–15.5)
IMM GRANULOCYTES NFR BLD AUTO: 1.4 % — SIGNIFICANT CHANGE UP (ref 0–1.5)
LYMPHOCYTES # BLD AUTO: 1.25 K/UL — SIGNIFICANT CHANGE UP (ref 1–3.3)
LYMPHOCYTES # BLD AUTO: 13.4 % — SIGNIFICANT CHANGE UP (ref 13–44)
MAGNESIUM SERPL-MCNC: 1.6 MG/DL — SIGNIFICANT CHANGE UP (ref 1.6–2.6)
MCHC RBC-ENTMCNC: 29.8 PG — SIGNIFICANT CHANGE UP (ref 27–34)
MCHC RBC-ENTMCNC: 31.8 GM/DL — LOW (ref 32–36)
MCV RBC AUTO: 93.5 FL — SIGNIFICANT CHANGE UP (ref 80–100)
MONOCYTES # BLD AUTO: 0.32 K/UL — SIGNIFICANT CHANGE UP (ref 0–0.9)
MONOCYTES NFR BLD AUTO: 3.4 % — SIGNIFICANT CHANGE UP (ref 2–14)
NEUTROPHILS # BLD AUTO: 7.36 K/UL — SIGNIFICANT CHANGE UP (ref 1.8–7.4)
NEUTROPHILS NFR BLD AUTO: 79.2 % — HIGH (ref 43–77)
NRBC # BLD: 0 /100 WBCS — SIGNIFICANT CHANGE UP (ref 0–0)
PHOSPHATE SERPL-MCNC: 1.6 MG/DL — LOW (ref 2.5–4.5)
PLATELET # BLD AUTO: 132 K/UL — LOW (ref 150–400)
POTASSIUM SERPL-MCNC: 3.3 MMOL/L — LOW (ref 3.5–5.3)
POTASSIUM SERPL-MCNC: 4.6 MMOL/L — SIGNIFICANT CHANGE UP (ref 3.5–5.3)
POTASSIUM SERPL-SCNC: 3.3 MMOL/L — LOW (ref 3.5–5.3)
POTASSIUM SERPL-SCNC: 4.6 MMOL/L — SIGNIFICANT CHANGE UP (ref 3.5–5.3)
PROT SERPL-MCNC: 4.4 G/DL — LOW (ref 6–8.3)
RBC # BLD: 3.36 M/UL — LOW (ref 3.8–5.2)
RBC # FLD: 14.2 % — SIGNIFICANT CHANGE UP (ref 10.3–14.5)
SODIUM SERPL-SCNC: 143 MMOL/L — SIGNIFICANT CHANGE UP (ref 135–145)
SODIUM SERPL-SCNC: 143 MMOL/L — SIGNIFICANT CHANGE UP (ref 135–145)
WBC # BLD: 9.3 K/UL — SIGNIFICANT CHANGE UP (ref 3.8–10.5)
WBC # FLD AUTO: 9.3 K/UL — SIGNIFICANT CHANGE UP (ref 3.8–10.5)

## 2020-04-24 PROCEDURE — 99358 PROLONG SERVICE W/O CONTACT: CPT

## 2020-04-24 RX ORDER — MAGNESIUM SULFATE 500 MG/ML
2 VIAL (ML) INJECTION ONCE
Refills: 0 | Status: COMPLETED | OUTPATIENT
Start: 2020-04-24 | End: 2020-04-24

## 2020-04-24 RX ORDER — SODIUM CHLORIDE 9 MG/ML
1000 INJECTION, SOLUTION INTRAVENOUS
Refills: 0 | Status: DISCONTINUED | OUTPATIENT
Start: 2020-04-24 | End: 2020-04-24

## 2020-04-24 RX ORDER — DEXTROSE 10 % IN WATER 10 %
1000 INTRAVENOUS SOLUTION INTRAVENOUS
Refills: 0 | Status: DISCONTINUED | OUTPATIENT
Start: 2020-04-24 | End: 2020-04-24

## 2020-04-24 RX ORDER — DOCUSATE SODIUM 100 MG
1 CAPSULE ORAL
Qty: 0 | Refills: 0 | DISCHARGE

## 2020-04-24 RX ORDER — SODIUM CHLORIDE 9 MG/ML
100 INJECTION, SOLUTION INTRAVENOUS
Qty: 1 | Refills: 0
Start: 2020-04-24

## 2020-04-24 RX ORDER — POTASSIUM PHOSPHATE, MONOBASIC POTASSIUM PHOSPHATE, DIBASIC 236; 224 MG/ML; MG/ML
30 INJECTION, SOLUTION INTRAVENOUS ONCE
Refills: 0 | Status: COMPLETED | OUTPATIENT
Start: 2020-04-24 | End: 2020-04-24

## 2020-04-24 RX ORDER — ATENOLOL 25 MG/1
1 TABLET ORAL
Qty: 0 | Refills: 0 | DISCHARGE

## 2020-04-24 RX ORDER — POTASSIUM CHLORIDE 20 MEQ
10 PACKET (EA) ORAL
Refills: 0 | Status: COMPLETED | OUTPATIENT
Start: 2020-04-24 | End: 2020-04-24

## 2020-04-24 RX ADMIN — SODIUM CHLORIDE 50 MILLILITER(S): 9 INJECTION, SOLUTION INTRAVENOUS at 09:48

## 2020-04-24 RX ADMIN — Medication 1 DROP(S): at 18:10

## 2020-04-24 RX ADMIN — SODIUM CHLORIDE 115 MILLILITER(S): 9 INJECTION, SOLUTION INTRAVENOUS at 07:13

## 2020-04-24 RX ADMIN — POTASSIUM PHOSPHATE, MONOBASIC POTASSIUM PHOSPHATE, DIBASIC 83.33 MILLIMOLE(S): 236; 224 INJECTION, SOLUTION INTRAVENOUS at 10:45

## 2020-04-24 RX ADMIN — Medication 100 MILLIEQUIVALENT(S): at 09:52

## 2020-04-24 RX ADMIN — Medication 50 GRAM(S): at 08:51

## 2020-04-24 RX ADMIN — Medication 1: at 06:46

## 2020-04-24 RX ADMIN — Medication 1 DROP(S): at 06:46

## 2020-04-24 RX ADMIN — Medication 100 MILLIEQUIVALENT(S): at 11:21

## 2020-04-24 RX ADMIN — Medication 100 MILLIEQUIVALENT(S): at 12:22

## 2020-04-24 RX ADMIN — ERTAPENEM SODIUM 120 MILLIGRAM(S): 1 INJECTION, POWDER, LYOPHILIZED, FOR SOLUTION INTRAMUSCULAR; INTRAVENOUS at 11:22

## 2020-04-24 RX ADMIN — Medication 50 MILLILITER(S): at 09:48

## 2020-04-24 RX ADMIN — Medication 1 APPLICATION(S): at 18:10

## 2020-04-24 RX ADMIN — ENOXAPARIN SODIUM 45 MILLIGRAM(S): 100 INJECTION SUBCUTANEOUS at 11:23

## 2020-04-24 RX ADMIN — Medication 1 APPLICATION(S): at 06:46

## 2020-04-24 RX ADMIN — CHLORHEXIDINE GLUCONATE 1 APPLICATION(S): 213 SOLUTION TOPICAL at 06:46

## 2020-04-24 NOTE — PROGRESS NOTE ADULT - PROBLEM SELECTOR PLAN 5
Na 159 on admission, s/p NS IVF 500cc bolus in ED. Likely 2/2 dehydration as evidenced by labs; 149 this AM (increasing from prior)   - s/p midline placement 4/22  - [id number]+1/2NS @100cc/hr to D10+LR     #persistent hypoglycemia   Pt taking in very little po, if at all leading to hypoglycemia, requiring multiple amps of D50 throughout the day and night.   - continue D10+LR @100cc/hr

## 2020-04-24 NOTE — PROGRESS NOTE ADULT - PROBLEM SELECTOR PLAN 9
soft BPs, currently systolics 100s  -Hold home Atenolol 25mg    #anxiety   Plan: hx of LOLA  - continue home Mirtazapine 5mg qHS.

## 2020-04-24 NOTE — DISCHARGE NOTE PROVIDER - HOSPITAL COURSE
89yo Female, DNR/DNI (no abx or tube feeds), with PMHx of HTN, HLD, dCHF and generalized anxiety disorder who was brought to [facility] ED from [facility] after pt was found to be hypoxic to the 80s on RA and hypotensive. COVID+, RETA 2/2 urosepsis, pt now admitted to [facility] for further management.        Problem List/Main Diagnoses (system-based):     1. Severe sepsis.  Plan: Meeting 2/4 SIRS criteria (hypothermia and WBC 21). lactate 2.9, cleared to 1.2 s/p NS bolus, likely in setting of decreased PO intake. U/A grossly positive. Pt urinating with primafit    - s/p 2 doses of IV CTX 1g q24h, however ESBL resistant to CTX; switched to ertapenem 4/18    - continue IV ertapenem 1g q24h (end 4/24 for total 7 day course)    - blood cultures NGTD    - repeat blood cxs NGTD.         2. mental status not recovering even after 7 days of ertapenem to treat ESBL UTI urosepsis.     - palliative care following     - HCP (Radha) and friend Milka making decisions--[facility]; [facility]            3. COVID-19; Presented with few weeks of generalized weakness and SOB; At [facility] pt found to be hypoxic to the 80s, now Saturating 98% on RA    - inflammatory markers: D-dimer 2122, CRP 25.72-->2, Ferritin 3048; procalcitonin 0.79, lactate 2.9, WBC 21.11 with left shift    - inflammatory markers trending down    - QTC on admission 475    - CXR revealed clear lungs    - s/p IV azithromycin 250mg x 5 days (end 4/20)    -s/p solu-medrol 40mg IV BID x 5 days (end 4/20)    -saturating on RA 94%        #hypercoagulable state 2/2 COVID; b/l DVT +    LE dopplers positive for b/l DVTs     - treated with eliquis 2.5mg BID or lovenox 45mg q24h (alternated, based on ability to take po)        4. Hypernatremia    - s/p midline placement 4/22    - [id number]+1/2NS @100cc/hr to D10+LR         #persistent hypoglycemia     Pt taking in very little po, if at all leading to hypoglycemia, requiring multiple amps of D50 throughout the day and night.     - continue D10+LR @100cc/hr.             5. metabolic toxic encephalopathy. AOx0 on admission, nonverbal, baseline per friend and niece (Rdaha) a week ago, lucid and eating/drinking. AMS likely multifactorial including urosepsis and COVID and hypernatremia. AOx0 today, unclear if not participatory or mental status deteriorating    -waxing and waning mental status; only opening eyes intermittently, not speaking    -continue tx COVID, hypernatremia, and urosepsis    -pt w/ metabolic encephalopathy likely 2/2 COVID, dehydration, hypernatremia, and UTI        #poor po intake     - speech and swallow cleared for pureed and thin liquids    - pt intermittently refusing po.            7. RETA (acute kidney injury)    RESOLVED     BUN/Cr 86/1.61 on admission, most likely 2/2 dehydration s/p NS IVF 500cc bolus in ED Cr improved to 1.45. Cr back to baseline 0.94    -Continue to monitor and avoid nephrotoxic agents        8. Thrombocytopenia    Improving. Plt 132 on discharge. PLT 71 on admission likely in setting of COVID and acute infection as pt with normal plt in 07/2018.    - fibrinogen elevated--not DIC        #anemia     Pt with drop in Hgb to 10.4 this AM from 13.2 prior; likely 2/2 bolus fluids and this is pt's baseline. No signs of bleeding and hemodynamically stable    - continue to monitor.         Inpatient treatment course:     as above         New medications:     - continue D10+LR @100cc/hr via midline for hypoglycemia 89yo Female, DNR/DNI (no abx or tube feeds), with PMHx of HTN, HLD, dCHF and generalized anxiety disorder who was brought to [facility] ED from [facility] after pt was found to be hypoxic to the 80s on RA and hypotensive. COVID+, RETA 2/2 urosepsis, pt now admitted to [facility] for further management.        Problem List/Main Diagnoses (system-based):     1. Severe sepsis.  Plan: Meeting 2/4 SIRS criteria (hypothermia and WBC 21). lactate 2.9, cleared to 1.2 s/p NS bolus, likely in setting of decreased PO intake. U/A grossly positive. Pt urinating with primafit    - s/p 2 doses of IV CTX 1g q24h, however ESBL resistant to CTX; switched to ertapenem 4/18    - continue IV ertapenem 1g q24h (end 4/24 for total 7 day course)    - blood cultures NGTD    - repeat blood cxs NGTD.         2. mental status not recovering even after 7 days of ertapenem to treat ESBL UTI urosepsis.     - palliative care following     - HCP (Radha) and friend Milka making decisions--will refer [email or web address]; [facility]            3. COVID-19; Presented with few weeks of generalized weakness and SOB; At [facility] pt found to be hypoxic to the 80s, now Saturating 98% on RA    - inflammatory markers: D-dimer 2122, CRP 25.72-->2, Ferritin 3048; procalcitonin 0.79, lactate 2.9, WBC 21.11 with left shift    - inflammatory markers trending down    - QTC on admission 475    - CXR revealed clear lungs    - s/p IV azithromycin 250mg x 5 days (end 4/20)    -s/p solu-medrol 40mg IV BID x 5 days (end 4/20)    -saturating on RA 94%        #hypercoagulable state 2/2 COVID; b/l DVT +    LE dopplers positive for b/l DVTs     - treated with eliquis 2.5mg BID or lovenox 45mg q24h (alternated, based on ability to take po)        4. Hypernatremia    - s/p midline placement 4/22    - [id number]+1/2NS @100cc/hr to D10+LR         #persistent hypoglycemia     Pt taking in very little po, if at all leading to hypoglycemia, requiring multiple amps of D50 throughout the day and night.     - continue D10+LR @100cc/hr.             5. metabolic toxic encephalopathy. AOx0 on admission, nonverbal, baseline per friend and niece (Radha) a week ago, lucid and eating/drinking. AMS likely multifactorial including urosepsis and COVID and hypernatremia. AOx0 today, unclear if not participatory or mental status deteriorating    -waxing and waning mental status; only opening eyes intermittently, not speaking    -continue tx COVID, hypernatremia, and urosepsis    -pt w/ metabolic encephalopathy likely 2/2 COVID, dehydration, hypernatremia, and UTI        #poor po intake     - speech and swallow cleared for pureed and thin liquids    - pt intermittently refusing po.            7. RETA (acute kidney injury)    RESOLVED     BUN/Cr 86/1.61 on admission, most likely 2/2 dehydration s/p NS IVF 500cc bolus in ED Cr improved to 1.45. Cr back to baseline 0.94    -Continue to monitor and avoid nephrotoxic agents        8. Thrombocytopenia    Improving. Plt 132 on discharge. PLT 71 on admission likely in setting of COVID and acute infection as pt with normal plt in 07/2018.    - fibrinogen elevated--not DIC        #anemia     Pt with drop in Hgb to 10.4 this AM from 13.2 prior; likely 2/2 bolus fluids and this is pt's baseline. No signs of bleeding and hemodynamically stable    - continue to monitor.         Inpatient treatment course:     as above         New medications:     - continue D10+LR @100cc/hr via midline for hypoglycemia            Attending Addendum: Patient seen and examined. She is arousable to voice and tactile stimuli. Denies any pain currently. Patient completing course of antibiotics for UTI today. Hypernatremia better with IVF and will adjust. BG stable with D10 infusion. Patient stable for DC to [facility] with hospice services.

## 2020-04-24 NOTE — PROGRESS NOTE ADULT - PROBLEM SELECTOR PLAN 2
Pt not tolerating any po, refusing and mental status not recovering even after 7 days of ertapenem to treat ESBL UTI urosepsis. Pt not tolerating any po, refusing and mental status not recovering even after 7 days of ertapenem to treat ESBL UTI urosepsis.   - palliative care following   - HCP (Radha) and friend Milka making decisions--will refer to [facility] hospice

## 2020-04-24 NOTE — DISCHARGE NOTE PROVIDER - NSDCCPCAREPLAN_GEN_ALL_CORE_FT
PRINCIPAL DISCHARGE DIAGNOSIS  Diagnosis: Sepsis with metabolic encephalopathy  Assessment and Plan of Treatment: You were admitted for sepsis due to a urinary tract infection as well as COVID. Your UTI bug grew a resistant bug called ESBL e coli and we treated you with 7 days of IV ertapenem (an antibiotic). We thought your mental status would improve once the UTI cleared, however unfortunately it did not.      SECONDARY DISCHARGE DIAGNOSES  Diagnosis: Hypoglycemia  Assessment and Plan of Treatment: Another big issue while you were in the hospital was that your blood sugar levels were low consistently while you were in the hospital. This is because you were not eating anything. We put you on maintenance fluids that included dextorse to keep your blood glucose up. You will likely continue on this at rehab.    Diagnosis: COVID-19  Assessment and Plan of Treatment: You tested positive for COVID 19, however your breathing was comfortable and your oxygen saturations during your entire stay was in the high 90s, just on room air.    Diagnosis: Hypernatremia  Assessment and Plan of Treatment: Another problem was your sodium levels were high during your admission. This is because you had such poor eating. You were not taking in any food or drink orally and this causes your sodium to rise. We tried to encourage you, but your mental status did not allow for you to feed yourself and even when fed, you refused. We treated this problem by giving you a lot of IV fluids, continuously.

## 2020-04-24 NOTE — PROGRESS NOTE ADULT - ASSESSMENT
89yo Female, DNR/DNI (no abx or tube feeds), with PMHx of HTN, HLD, dCHF and generalized anxiety disorder who was brought to [facility] ED from [facility] after pt was found to be hypoxic to the 80s on RA and hypotensive. COVID+, RETA 2/2 urosepsis, pt now admitted to [facility] for further management.

## 2020-04-24 NOTE — PROGRESS NOTE ADULT - PROBLEM SELECTOR PLAN 3
Presented with few weeks of generalized weakness and SOB; At [facility] pt found to be hypoxic to the 80s, now Saturating 98% on RA  - COVID+, contact droplet isolation  - inflammatory markers: ddimer 2122, CRP 25.72-->2, Ferritin 3048; procalcitonin 0.79, lactate 2.9, WBC 21.11 with left shift  - inflammatory markers trending down  - QTC on admission 475  - CXR revealed clear lungs  -confirmed with HCP that previous "no abx" stated on previous MOLST will be reversed   - s/p IV azithromycin 250mg x 5 days (end 4/20)  -s/p solu-medrol 40mg IV BID x 5 days (end 4/20)  -Continue to monitor daily CBC/CMP/Mg/PO4/CRP/Ferritin/D-Dimer  -saturating on RA 94%    #hypercoagulable state 2/2 COVID; b/l DVT +  LE dopplers positive for b/l DVTs   - continue eliquis 2.5mg BID

## 2020-04-24 NOTE — DISCHARGE NOTE NURSING/CASE MANAGEMENT/SOCIAL WORK - PATIENT PORTAL LINK FT
You can access the FollowMyHealth Patient Portal offered by [facility] by registering at the following website: [email or web address]. By joining [facility]’s FollowMyHealth portal, you will also be able to view your health information using other applications (apps) compatible with our system.

## 2020-04-24 NOTE — DISCHARGE NOTE PROVIDER - CARE PROVIDER_API CALL
Luis F Rodriguez)  Internal Medicine  [address] [address]  Phone: [phone]  Fax: [phone]  Follow Up Time:

## 2020-04-24 NOTE — PROGRESS NOTE ADULT - PROBLEM SELECTOR PLAN 1
Meeting 2/4 SIRS criteria (hypothermia and WBC 21). lactate 2.9, cleared to 1.2 s/p NS bolus, likely in setting of decreased PO intake. U/A grossly positive. Pt urinating with primafit  - s/p 2 doses of IV CTX 1g q24h, however ESBL resistant to CTX; switched to ertapenem 4/18  - continue IV ertapenem 1g q24h (end 4/24 for total 7 day course)  - blood cultures NGTD  - repeat blood cxs NGTD

## 2020-04-24 NOTE — CHART NOTE - NSCHARTNOTEFT_GEN_A_CORE
PALLIATIVE MEDICINE COORDINATION OF CARE NOTE FOR MIKY DE JESUS      Patient last assessed on: __4/23/2020____ in order to manage: ______ and was recommended: ____[facility] / [facility] with hospice services ___      ___30___ Minutes; Start: ___0900am__  End: _0930am___, of non-face-to-face prolonged service provided that relates to (face-to-face) care that has or will occur and ongoing patient management, including one or more of the following:     - Reviewed records from other physicians or other health care professional services, including one or more of the following: other medical records and diagnostic / radiology study results     HPI:  89yo Female, DNR/DNI (no abx, no tube feeds), with PMHx of HTN, HLD, dCHF and generalized anxiety disorder who was brought to [facility] ED from [facility] after pt was found to be hypoxic to the 80s on RA and hypotensive. Pt endorses generalized weakness x few weeks and per facility nursing report pt has been dyspneic for a few days. Denies cough, congestion, fever/chills, myalgias, abd pain, N/V/D, CP, palpitations, LE edema or calf tenderness.  On arrival to ED, T 97.4, HR 84bpm, /62, RR 14, SpO2 99% RA; Labs significant for WBC 21.11 with left shift, PLT 71, Ddimer 3667, CRP 25.72, Ferritin 3048, Lactate 2.9, procal 0.79, Na 159, Cl 120, BUN/Cr 86/1.61, , Trop 0.04; CXR revealed clear lungs and COVID PCR positive. Pt received NS IVF 500cc bolus. Pt now admitted to [facility] for further management of COVID, RETA, and dehydration.    Date of onset of fever: NONE  Date of onset of dyspnea: few days  Recent Travel: NONE  Sick Contacts/COVID exposure: possible, lives in [facility] nursing facility    ROS:  Fevers: N  Malaise: Y  Myalgias: N  SOB: Y      Cough: N         Productive: N  Nausea: N  Vomiting: N  Diarrhea: N    PMHx:   HTN: Y  DM: N  Lung Disease: N  Cardiovascular disease: N  Malignancy: N  Immunosuppression: N  HIV: N  CKD/ESRD: N  Chronic Liver Disease: N (16 Apr 2020 03:05)       Other: Medication reviewed.    The patient HAS NOT used PRN's in the last 24h.    MEDICATIONS  (STANDING):  ammonium lactate 12% Lotion 1 Application(s) Topical two times a day  artificial  tears Solution 1 Drop(s) Both EYES two times a day  chlorhexidine 2% Cloths 1 Application(s) Topical <User Schedule>  dextrose 10%. 1000 milliLiter(s) (50 mL/Hr) IV Continuous <Continuous>  dextrose 5%. 1000 milliLiter(s) (50 mL/Hr) IV Continuous <Continuous>  dextrose 50% Injectable 12.5 Gram(s) IV Push once  dextrose 50% Injectable 25 Gram(s) IV Push once  dextrose 50% Injectable 25 Gram(s) IV Push once  enoxaparin Injectable 45 milliGRAM(s) SubCutaneous every 24 hours  ertapenem  IVPB 1000 milliGRAM(s) IV Intermittent every 24 hours  lactated ringers. 1000 milliLiter(s) (50 mL/Hr) IV Continuous <Continuous>  mirtazapine 15 milliGRAM(s) Oral at bedtime  multivitamin 1 Tablet(s) Oral daily  potassium chloride  10 mEq/100 mL IVPB 10 milliEquivalent(s) IV Intermittent every 1 hour    MEDICATIONS  (PRN):  dextrose 40% Gel 15 Gram(s) Oral once PRN Blood Glucose LESS THAN 70 milliGRAM(s)/deciliter  glucagon  Injectable 1 milliGRAM(s) IntraMuscular once PRN Glucose LESS THAN 70 milligrams/deciliter  sodium chloride 0.9% lock flush 10 milliLiter(s) IV Push every 1 hour PRN Pre/post blood products, medications, blood draw, and to maintain line patency    - Other: Advanced directives  Patient is a DNR/DNI.  Radha, ([phone]) who is her HCP along with a longtime good friend, Milka Price ([phone]).       - Other: Coordination/Plan of care   Patient does not meet criteria for [facility]. Would benefit from [facility] at the her Nursing home. Spoke to Radha, who is ok with sending her back with hospice on board when she gets there. Palliative care will sign off at this time. Please reconsult as needed.    Discussed with primary team.

## 2020-04-24 NOTE — PROGRESS NOTE ADULT - SUBJECTIVE AND OBJECTIVE BOX
OVERNIGHT EVENTS:    SUBJECTIVE / INTERVAL HPI: Patient seen and examined at bedside.     VITAL SIGNS:  Vital Signs Last 24 Hrs  T(C): 36.8 (24 Apr 2020 06:43), Max: 37.1 (23 Apr 2020 21:24)  T(F): 98.2 (24 Apr 2020 06:43), Max: 98.8 (23 Apr 2020 21:24)  HR: 79 (24 Apr 2020 06:43) (56 - 79)  BP: 122/77 (24 Apr 2020 06:43) (92/64 - 122/77)  BP(mean): --  RR: 20 (24 Apr 2020 06:43) (18 - 22)  SpO2: 98% (24 Apr 2020 06:43) (96% - 99%)    PHYSICAL EXAM:    General: WDWN  HEENT: NC/AT; PERRL, anicteric sclera; MMM  Neck: supple  Cardiovascular: +S1/S2; RRR  Respiratory: CTA B/L; no W/R/R  Gastrointestinal: soft, NT/ND; +BSx4  Extremities: WWP; no edema, clubbing or cyanosis  Vascular: 2+ radial, DP/PT pulses B/L  Neurological: AAOx3; no focal deficits    MEDICATIONS:  MEDICATIONS  (STANDING):  ammonium lactate 12% Lotion 1 Application(s) Topical two times a day  artificial  tears Solution 1 Drop(s) Both EYES two times a day  chlorhexidine 2% Cloths 1 Application(s) Topical <User Schedule>  dextrose 10%. 1000 milliLiter(s) (50 mL/Hr) IV Continuous <Continuous>  dextrose 5%. 1000 milliLiter(s) (50 mL/Hr) IV Continuous <Continuous>  dextrose 50% Injectable 12.5 Gram(s) IV Push once  dextrose 50% Injectable 25 Gram(s) IV Push once  dextrose 50% Injectable 25 Gram(s) IV Push once  enoxaparin Injectable 45 milliGRAM(s) SubCutaneous every 24 hours  ertapenem  IVPB 1000 milliGRAM(s) IV Intermittent every 24 hours  lactated ringers. 1000 milliLiter(s) (50 mL/Hr) IV Continuous <Continuous>  mirtazapine 15 milliGRAM(s) Oral at bedtime  multivitamin 1 Tablet(s) Oral daily  potassium chloride  10 mEq/100 mL IVPB 10 milliEquivalent(s) IV Intermittent every 1 hour  potassium phosphate IVPB 30 milliMole(s) IV Intermittent once    MEDICATIONS  (PRN):  dextrose 40% Gel 15 Gram(s) Oral once PRN Blood Glucose LESS THAN 70 milliGRAM(s)/deciliter  glucagon  Injectable 1 milliGRAM(s) IntraMuscular once PRN Glucose LESS THAN 70 milligrams/deciliter  sodium chloride 0.9% lock flush 10 milliLiter(s) IV Push every 1 hour PRN Pre/post blood products, medications, blood draw, and to maintain line patency      ALLERGIES:  Allergies    No Known Allergies    Intolerances        LABS:                        10.0   9.30  )-----------( 132      ( 24 Apr 2020 07:18 )             31.4     04-24    143  |  115<H>  |  19  ----------------------------<  125<H>  3.3<L>   |  21<L>  |  0.64    Ca    7.0<L>      24 Apr 2020 07:18  Phos  1.6     04-24  Mg     1.6     04-24    TPro  4.4<L>  /  Alb  1.6<L>  /  TBili  0.4  /  DBili  x   /  AST  28  /  ALT  31  /  AlkPhos  50  04-24        CAPILLARY BLOOD GLUCOSE      POCT Blood Glucose.: 154 mg/dL (24 Apr 2020 06:27)      RADIOLOGY & ADDITIONAL TESTS: Reviewed. OVERNIGHT EVENTS: NAEO    SUBJECTIVE / INTERVAL HPI: Patient seen and examined at bedside. AOx0, opening eyes this morning, still on joselyn hugger. Not taking po, refusing.     VITAL SIGNS:  Vital Signs Last 24 Hrs  T(C): 36.8 (24 Apr 2020 06:43), Max: 37.1 (23 Apr 2020 21:24)  T(F): 98.2 (24 Apr 2020 06:43), Max: 98.8 (23 Apr 2020 21:24)  HR: 79 (24 Apr 2020 06:43) (56 - 79)  BP: 122/77 (24 Apr 2020 06:43) (92/64 - 122/77)  BP(mean): --  RR: 20 (24 Apr 2020 06:43) (18 - 22)  SpO2: 98% (24 Apr 2020 06:43) (96% - 99%)    PHYSICAL EXAM:    General: chronically ill appearing frail elderly female, opening eyes, AOx0  HEENT: NC/AT; PERRL, anicteric sclera; poor oral hygiene, dry lips  Respiratory: no increased work of breathing, breathing comfortably on RA; saturating 98%  Gastrointestinal: soft, NT/ND  : primafit in place draining yellow-orange urine  Extremities: WWP; no edema, chronic vascular changes bilaterally up to knees with scaling, no purulence or exudates  Neurological: opening eyes, AOx0    MEDICATIONS:  MEDICATIONS  (STANDING):  ammonium lactate 12% Lotion 1 Application(s) Topical two times a day  artificial  tears Solution 1 Drop(s) Both EYES two times a day  chlorhexidine 2% Cloths 1 Application(s) Topical <User Schedule>  dextrose 10%. 1000 milliLiter(s) (50 mL/Hr) IV Continuous <Continuous>  dextrose 5%. 1000 milliLiter(s) (50 mL/Hr) IV Continuous <Continuous>  dextrose 50% Injectable 12.5 Gram(s) IV Push once  dextrose 50% Injectable 25 Gram(s) IV Push once  dextrose 50% Injectable 25 Gram(s) IV Push once  enoxaparin Injectable 45 milliGRAM(s) SubCutaneous every 24 hours  ertapenem  IVPB 1000 milliGRAM(s) IV Intermittent every 24 hours  lactated ringers. 1000 milliLiter(s) (50 mL/Hr) IV Continuous <Continuous>  mirtazapine 15 milliGRAM(s) Oral at bedtime  multivitamin 1 Tablet(s) Oral daily  potassium chloride  10 mEq/100 mL IVPB 10 milliEquivalent(s) IV Intermittent every 1 hour  potassium phosphate IVPB 30 milliMole(s) IV Intermittent once    MEDICATIONS  (PRN):  dextrose 40% Gel 15 Gram(s) Oral once PRN Blood Glucose LESS THAN 70 milliGRAM(s)/deciliter  glucagon  Injectable 1 milliGRAM(s) IntraMuscular once PRN Glucose LESS THAN 70 milligrams/deciliter  sodium chloride 0.9% lock flush 10 milliLiter(s) IV Push every 1 hour PRN Pre/post blood products, medications, blood draw, and to maintain line patency      ALLERGIES:  Allergies    No Known Allergies    Intolerances        LABS:                        10.0   9.30  )-----------( 132      ( 24 Apr 2020 07:18 )             31.4     04-24    143  |  115<H>  |  19  ----------------------------<  125<H>  3.3<L>   |  21<L>  |  0.64    Ca    7.0<L>      24 Apr 2020 07:18  Phos  1.6     04-24  Mg     1.6     04-24    TPro  4.4<L>  /  Alb  1.6<L>  /  TBili  0.4  /  DBili  x   /  AST  28  /  ALT  31  /  AlkPhos  50  04-24        CAPILLARY BLOOD GLUCOSE      POCT Blood Glucose.: 154 mg/dL (24 Apr 2020 06:27)      RADIOLOGY & ADDITIONAL TESTS: Reviewed.

## 2020-04-25 LAB
CULTURE RESULTS: NO GROWTH — SIGNIFICANT CHANGE UP
SPECIMEN SOURCE: SIGNIFICANT CHANGE UP

## 2020-04-29 DIAGNOSIS — I50.30 UNSPECIFIED DIASTOLIC (CONGESTIVE) HEART FAILURE: ICD-10-CM

## 2020-04-29 DIAGNOSIS — E86.0 DEHYDRATION: ICD-10-CM

## 2020-04-29 DIAGNOSIS — U07.1 COVID-19: ICD-10-CM

## 2020-04-29 DIAGNOSIS — R65.20 SEVERE SEPSIS WITHOUT SEPTIC SHOCK: ICD-10-CM

## 2020-04-29 DIAGNOSIS — R79.1 ABNORMAL COAGULATION PROFILE: ICD-10-CM

## 2020-04-29 DIAGNOSIS — E87.0 HYPEROSMOLALITY AND HYPERNATREMIA: ICD-10-CM

## 2020-04-29 DIAGNOSIS — N39.0 URINARY TRACT INFECTION, SITE NOT SPECIFIED: ICD-10-CM

## 2020-04-29 DIAGNOSIS — N17.9 ACUTE KIDNEY FAILURE, UNSPECIFIED: ICD-10-CM

## 2020-04-29 DIAGNOSIS — E87.1 HYPO-OSMOLALITY AND HYPONATREMIA: ICD-10-CM

## 2020-04-29 DIAGNOSIS — I11.0 HYPERTENSIVE HEART DISEASE WITH HEART FAILURE: ICD-10-CM

## 2020-04-29 DIAGNOSIS — A41.89 OTHER SPECIFIED SEPSIS: ICD-10-CM

## 2020-04-29 DIAGNOSIS — B96.29 OTHER ESCHERICHIA COLI [E. COLI] AS THE CAUSE OF DISEASES CLASSIFIED ELSEWHERE: ICD-10-CM

## 2020-04-29 DIAGNOSIS — D64.9 ANEMIA, UNSPECIFIED: ICD-10-CM

## 2020-04-29 DIAGNOSIS — D69.6 THROMBOCYTOPENIA, UNSPECIFIED: ICD-10-CM

## 2020-04-29 DIAGNOSIS — E87.2 ACIDOSIS: ICD-10-CM

## 2020-04-29 DIAGNOSIS — E78.5 HYPERLIPIDEMIA, UNSPECIFIED: ICD-10-CM

## 2020-04-29 DIAGNOSIS — G93.41 METABOLIC ENCEPHALOPATHY: ICD-10-CM

## 2020-04-29 DIAGNOSIS — E16.2 HYPOGLYCEMIA, UNSPECIFIED: ICD-10-CM

## 2020-04-29 DIAGNOSIS — F41.1 GENERALIZED ANXIETY DISORDER: ICD-10-CM

## 2020-04-30 PROCEDURE — 87186 SC STD MICRODIL/AGAR DIL: CPT

## 2020-04-30 PROCEDURE — 82553 CREATINE MB FRACTION: CPT

## 2020-04-30 PROCEDURE — 36410 VNPNXR 3YR/> PHY/QHP DX/THER: CPT

## 2020-04-30 PROCEDURE — 80048 BASIC METABOLIC PNL TOTAL CA: CPT

## 2020-04-30 PROCEDURE — 86901 BLOOD TYPING SEROLOGIC RH(D): CPT

## 2020-04-30 PROCEDURE — 93005 ELECTROCARDIOGRAM TRACING: CPT

## 2020-04-30 PROCEDURE — 82330 ASSAY OF CALCIUM: CPT

## 2020-04-30 PROCEDURE — 85025 COMPLETE CBC W/AUTO DIFF WBC: CPT

## 2020-04-30 PROCEDURE — 85379 FIBRIN DEGRADATION QUANT: CPT

## 2020-04-30 PROCEDURE — 86140 C-REACTIVE PROTEIN: CPT

## 2020-04-30 PROCEDURE — 84100 ASSAY OF PHOSPHORUS: CPT

## 2020-04-30 PROCEDURE — 81001 URINALYSIS AUTO W/SCOPE: CPT

## 2020-04-30 PROCEDURE — 87040 BLOOD CULTURE FOR BACTERIA: CPT

## 2020-04-30 PROCEDURE — 84443 ASSAY THYROID STIM HORMONE: CPT

## 2020-04-30 PROCEDURE — 82607 VITAMIN B-12: CPT

## 2020-04-30 PROCEDURE — 84132 ASSAY OF SERUM POTASSIUM: CPT

## 2020-04-30 PROCEDURE — 85384 FIBRINOGEN ACTIVITY: CPT

## 2020-04-30 PROCEDURE — 80061 LIPID PANEL: CPT

## 2020-04-30 PROCEDURE — 86480 TB TEST CELL IMMUN MEASURE: CPT

## 2020-04-30 PROCEDURE — 70450 CT HEAD/BRAIN W/O DYE: CPT

## 2020-04-30 PROCEDURE — 82962 GLUCOSE BLOOD TEST: CPT

## 2020-04-30 PROCEDURE — 84145 PROCALCITONIN (PCT): CPT

## 2020-04-30 PROCEDURE — 36415 COLL VENOUS BLD VENIPUNCTURE: CPT

## 2020-04-30 PROCEDURE — 82955 ASSAY OF G6PD ENZYME: CPT

## 2020-04-30 PROCEDURE — 85730 THROMBOPLASTIN TIME PARTIAL: CPT

## 2020-04-30 PROCEDURE — 87635 SARS-COV-2 COVID-19 AMP PRB: CPT

## 2020-04-30 PROCEDURE — 92610 EVALUATE SWALLOWING FUNCTION: CPT

## 2020-04-30 PROCEDURE — 84295 ASSAY OF SERUM SODIUM: CPT

## 2020-04-30 PROCEDURE — 82803 BLOOD GASES ANY COMBINATION: CPT

## 2020-04-30 PROCEDURE — 71045 X-RAY EXAM CHEST 1 VIEW: CPT

## 2020-04-30 PROCEDURE — 76937 US GUIDE VASCULAR ACCESS: CPT

## 2020-04-30 PROCEDURE — 83036 HEMOGLOBIN GLYCOSYLATED A1C: CPT

## 2020-04-30 PROCEDURE — 85652 RBC SED RATE AUTOMATED: CPT

## 2020-04-30 PROCEDURE — 84484 ASSAY OF TROPONIN QUANT: CPT

## 2020-04-30 PROCEDURE — 87086 URINE CULTURE/COLONY COUNT: CPT

## 2020-04-30 PROCEDURE — 93970 EXTREMITY STUDY: CPT

## 2020-04-30 PROCEDURE — 82550 ASSAY OF CK (CPK): CPT

## 2020-04-30 PROCEDURE — 99285 EMERGENCY DEPT VISIT HI MDM: CPT | Mod: 25

## 2020-04-30 PROCEDURE — 82746 ASSAY OF FOLIC ACID SERUM: CPT

## 2020-04-30 PROCEDURE — 85610 PROTHROMBIN TIME: CPT

## 2020-04-30 PROCEDURE — 83605 ASSAY OF LACTIC ACID: CPT

## 2020-04-30 PROCEDURE — 86850 RBC ANTIBODY SCREEN: CPT

## 2020-04-30 PROCEDURE — 82728 ASSAY OF FERRITIN: CPT

## 2020-04-30 PROCEDURE — 80053 COMPREHEN METABOLIC PANEL: CPT

## 2020-04-30 PROCEDURE — 82010 KETONE BODYS QUAN: CPT

## 2020-04-30 PROCEDURE — 83735 ASSAY OF MAGNESIUM: CPT

## 2020-04-30 PROCEDURE — 84439 ASSAY OF FREE THYROXINE: CPT

## 2021-04-21 NOTE — ED ADULT NURSE NOTE - TEMPLATE LIST FOR HEAD TO TOE ASSESSMENT
Fluid/Electrolyte/Metabolic
I personally performed the service described in the documentation recorded by the scribe in my presence, and it accurately and completely records my words and actions.

## 2022-06-20 NOTE — H&P ADULT - NSHPSOURCEINFORD_GEN_ALL_CORE
Chart(s) Valtrex Counseling: I discussed with the patient the risks of valacyclovir including but not limited to kidney damage, nausea, vomiting and severe allergy.  The patient understands that if the infection seems to be worsening or is not improving, they are to call.

## 2024-06-02 NOTE — PROGRESS NOTE ADULT - ATTENDING COMMENTS
DISCHARGE SUMMARY from Nurse    PATIENT INSTRUCTIONS:    After general anesthesia or intravenous sedation, for 24 hours or while taking prescription Narcotics:  Limit your activities  Do not drive and operate hazardous machinery  Do not make important personal or business decisions  Do  not drink alcoholic beverages  If you have not urinated within 8 hours after discharge, please contact your surgeon on call.    Report the following to your surgeon:  Excessive pain, swelling, redness or odor of or around the surgical area  Temperature over 100.5  Nausea and vomiting lasting longer than 4 hours or if unable to take medications  Any signs of decreased circulation or nerve impairment to extremity: change in color, persistent  numbness, tingling, coldness or increase pain  Any questions    What to do at Home:  Recommended activity: activity as tolerated.     If you experience any of the following symptoms nausea, vomiting, chest pain, shortness of breath, please follow up with MD.    *  Please give a list of your current medications to your Primary Care Provider.    *  Please update this list whenever your medications are discontinued, doses are      changed, or new medications (including over-the-counter products) are added.    *  Please carry medication information at all times in case of emergency situations.    These are general instructions for a healthy lifestyle:    No smoking/ No tobacco products/ Avoid exposure to second hand smoke  Surgeon General's Warning:  Quitting smoking now greatly reduces serious risk to your health.    Obesity, smoking, and sedentary lifestyle greatly increases your risk for illness    A healthy diet, regular physical exercise & weight monitoring are important for maintaining a healthy lifestyle    You may be retaining fluid if you have a history of heart failure or if you experience any of the following symptoms:  Weight gain of 3 pounds or more overnight or 5 pounds in a week,  Patient continually hypothermic; with downtrending bicarb and uptrending AG; will f/u lactate   bcx NGTD. Patient continually hypothermic; with downtrending bicarb and uptrending AG; will f/u lactate, serum ketones; bcx NGTD. Concern for possible underlying infection and sepsis vs adrenal insufficiency. Elevated BUN and hypernatremia continue to improve w/ IV fluids; will continue cautious administration for 24hrs.

## 2025-02-20 NOTE — PROGRESS NOTE ADULT - PROBLEM SELECTOR PROBLEM 5
"  Emergency Department Note      History of Present Illness     Chief Complaint   Abdominal Pain and Back Pain      HPI   Sola Brannon is a 44 year old female who presents to the emergency department with upper abdominal discomfort.  Patient reports having developed discomfort at 8 PM.  She tried to stretch, tried Pepto-Bismol.  Reports no change in stools from her baseline.  No vomiting or diarrhea.  No fevers.  No chest pain or shortness of breath.  Feels in her epigastrium wrapping around to the sides as well.  Developed some low back pain.  No dysuria or frequency.  Reports having had a tubal ligation in the past.  Prior  but otherwise no other history of abdominal surgeries.  Occasional alcohol use, is a smoker.    Independent Historian   None    Review of External Notes   Primary care note from 2024 regarding abdominal discomfort, occasional constipation also history of migraine.    Past Medical History     Medical History and Problem List   No past medical history on file.    Medications   No current outpatient medications on file.      Surgical History   No past surgical history on file.    Physical Exam     Patient Vitals for the past 24 hrs:   BP Temp Temp src Pulse Resp SpO2 Height Weight   25 0224 121/89 98.2  F (36.8  C) Oral 83 18 99 % 1.651 m (5' 5\") 81.5 kg (179 lb 10.8 oz)     Physical Exam  General: Resting on the chair   Head: No obvious trauma to head.  Ears, Nose, Throat:  External ears normal.  Nose normal.  Eyes:  Conjunctivae clear.    CV: Regular rate and rhythm.  No murmurs.      Respiratory: Effort normal and breath sounds normal.  No wheezing or crackles.   Gastrointestinal: Soft.  No distension. There is epigastric and more intense RUQ tenderness.  There is no rigidity, no rebound and no guarding.   Musculoskeletal: No CVA tenderness   Neuro: Alert. Moving all extremities appropriately.  Normal speech.    Skin: Skin is warm and dry.  No rash noted. "     Diagnostics     Lab Results   Labs Ordered and Resulted from Time of ED Arrival to Time of ED Departure   COMPREHENSIVE METABOLIC PANEL - Abnormal       Result Value    Sodium 133 (*)     Potassium 3.7      Carbon Dioxide (CO2) 24      Anion Gap 7      Urea Nitrogen 11.7      Creatinine 0.63      GFR Estimate >90      Calcium 9.2      Chloride 102      Glucose 118 (*)     Alkaline Phosphatase 48      AST 17      ALT 14      Protein Total 7.1      Albumin 4.1      Bilirubin Total 0.2     CBC WITH PLATELETS AND DIFFERENTIAL - Abnormal    WBC Count 12.3 (*)     RBC Count 4.43      Hemoglobin 12.3      Hematocrit 37.1      MCV 84      MCH 27.8      MCHC 33.2      RDW 13.2      Platelet Count 310      % Neutrophils 78      % Lymphocytes 15      % Monocytes 5      % Eosinophils 1      % Basophils 0      % Immature Granulocytes 0      NRBCs per 100 WBC 0      Absolute Neutrophils 9.6 (*)     Absolute Lymphocytes 1.9      Absolute Monocytes 0.6      Absolute Eosinophils 0.2      Absolute Basophils 0.0      Absolute Immature Granulocytes 0.1      Absolute NRBCs 0.0     LIPASE - Normal    Lipase 31     ROUTINE UA WITH MICROSCOPIC REFLEX TO CULTURE       Imaging   US Abdomen Limited (RUQ)   Final Result   IMPRESSION:   1.  Cholelithiasis and gallbladder distention. Mild gallbladder wall thickening.   2.  Sonographic Araiza's sign indeterminate due to pain medication. Correlate to exclude acute cholecystitis.   3.  No biliary dilatation.              Independent Interpretation   None    ED Course      Medications Administered   Medications   ondansetron (ZOFRAN) injection 4 mg (has no administration in time range)   cefTRIAXone (ROCEPHIN) 1 g vial to attach to  mL bag for ADULTS or NS 50 mL bag for PEDS (has no administration in time range)   acetaminophen (TYLENOL) tablet 650 mg (has no administration in time range)     Or   acetaminophen (TYLENOL) Suppository 650 mg (has no administration in time range)   melatonin  tablet 5 mg (has no administration in time range)   senna-docusate (SENOKOT-S/PERICOLACE) 8.6-50 MG per tablet 1 tablet (has no administration in time range)     Or   senna-docusate (SENOKOT-S/PERICOLACE) 8.6-50 MG per tablet 2 tablet (has no administration in time range)   ondansetron (ZOFRAN ODT) ODT tab 4 mg (has no administration in time range)     Or   ondansetron (ZOFRAN) injection 4 mg (has no administration in time range)   prochlorperazine (COMPAZINE) injection 10 mg (has no administration in time range)     Or   prochlorperazine (COMPAZINE) tablet 10 mg (has no administration in time range)   nicotine (COMMIT) lozenge 2 mg (has no administration in time range)   HYDROmorphone (DILAUDID) tablet 2 mg (has no administration in time range)   HYDROmorphone (PF) (DILAUDID) injection 0.5 mg (has no administration in time range)   ketorolac (TORADOL) injection 10 mg (10 mg Intravenous $Given 2/20/25 0308)   sodium chloride 0.9% BOLUS 1,000 mL (1,000 mLs Intravenous $New Bag 2/20/25 0305)       Procedures   Procedures     Discussion of Management   Admitting Hospitalist, see below    ED Course   ED Course as of 02/20/25 0429   Thu Feb 20, 2025   0330 Reassessed the patient, continues to have discomfort.  Plan for additional pain medication.   0337 I reassessed patient and updated on lab results.   0421 Patient.  Continues to have some mild discomfort.  Based on ultrasound findings opted to bring in for observation and have general surgery assessed.   0429 I spoke with BENNIE Billy for admission        Additional Documentation  None    Medical Decision Making / Diagnosis     CMS Diagnoses: None    MIPS       None    MDM   Sola Brannon is a 44 year old female who presents to the emergency department with upset stomach and discomfort.  Vital signs are stable.  Broad differential was considered including but not limited to gastritis, electrolyte, metabolic, renal dysfunction, pancreatitis,  cholecystitis, cholangitis, gastroenteritis, dehydration, etc.  Patient did develop vomiting in the emergency department fluids and pain medications.  CBC with mild leukocytosis but no significant anemia.  BMP shows no acute electrolyte, metabolic or renal dysfunction.  LFTs are reassuring as well as lipase, not suggestive of pancreatitis, hepatitis or obstructive biliary process.  Ultrasound was ordered given area of discomfort.  Patient given above pain medications.  Ultrasound unfortunately does show evidence of cholelithiasis and gallbladder distention with mild gallbladder wall thickening.  Patient has persistent pain in this area.  Improved but persistent.  Along with elevated white count, right upper quadrant tenderness and ultrasound findings concerning for acute cholecystitis.  Ceftriaxone given.  Admitted to the hospitalist for ongoing symptomatic cares and general surgery consultation.  Patient is agreeable.    Disposition   The patient was admitted to the hospital.     Diagnosis     ICD-10-CM    1. Right upper quadrant abdominal pain  R10.11       2. Bandemia  D72.825       3. Acute cholecystitis  K81.0            Discharge Medications   New Prescriptions    No medications on file         MD Judd Pittman Jennifer L, MD  02/20/25 0429     Encephalopathy
